# Patient Record
Sex: FEMALE | Race: WHITE | Employment: UNEMPLOYED | URBAN - METROPOLITAN AREA
[De-identification: names, ages, dates, MRNs, and addresses within clinical notes are randomized per-mention and may not be internally consistent; named-entity substitution may affect disease eponyms.]

---

## 2017-01-24 DIAGNOSIS — I10 ESSENTIAL HYPERTENSION: ICD-10-CM

## 2017-01-24 RX ORDER — LABETALOL 100 MG/1
TABLET, FILM COATED ORAL
Qty: 60 TABLET | Refills: 1 | Status: SHIPPED | OUTPATIENT
Start: 2017-01-24 | End: 2017-03-24 | Stop reason: SDUPTHER

## 2017-03-08 ENCOUNTER — OFFICE VISIT (OUTPATIENT)
Dept: FAMILY MEDICINE CLINIC | Age: 42
End: 2017-03-08

## 2017-03-08 VITALS
WEIGHT: 160 LBS | TEMPERATURE: 97.3 F | BODY MASS INDEX: 28.34 KG/M2 | SYSTOLIC BLOOD PRESSURE: 132 MMHG | HEART RATE: 86 BPM | DIASTOLIC BLOOD PRESSURE: 88 MMHG | RESPIRATION RATE: 18 BRPM | OXYGEN SATURATION: 98 %

## 2017-03-08 DIAGNOSIS — R59.9 ENLARGED LYMPH NODE: Primary | ICD-10-CM

## 2017-03-08 LAB
BASOPHILS ABSOLUTE: 0.1 K/UL (ref 0–0.2)
BASOPHILS RELATIVE PERCENT: 0.7 %
EOSINOPHILS ABSOLUTE: 0.1 K/UL (ref 0–0.6)
EOSINOPHILS RELATIVE PERCENT: 1.8 %
HCT VFR BLD CALC: 44.2 % (ref 36–48)
HEMOGLOBIN: 14.6 G/DL (ref 12–16)
HETEROPHILE ANTIBODIES: NEGATIVE
LYMPHOCYTES ABSOLUTE: 1.3 K/UL (ref 1–5.1)
LYMPHOCYTES RELATIVE PERCENT: 16.8 %
MCH RBC QN AUTO: 30.3 PG (ref 26–34)
MCHC RBC AUTO-ENTMCNC: 33 G/DL (ref 31–36)
MCV RBC AUTO: 91.9 FL (ref 80–100)
MONOCYTES ABSOLUTE: 0.6 K/UL (ref 0–1.3)
MONOCYTES RELATIVE PERCENT: 7.7 %
NEUTROPHILS ABSOLUTE: 5.7 K/UL (ref 1.7–7.7)
NEUTROPHILS RELATIVE PERCENT: 73 %
PDW BLD-RTO: 12.5 % (ref 12.4–15.4)
PLATELET # BLD: 305 K/UL (ref 135–450)
PMV BLD AUTO: 8.4 FL (ref 5–10.5)
RBC # BLD: 4.81 M/UL (ref 4–5.2)
WBC # BLD: 7.8 K/UL (ref 4–11)

## 2017-03-08 PROCEDURE — 99213 OFFICE O/P EST LOW 20 MIN: CPT | Performed by: NURSE PRACTITIONER

## 2017-03-08 PROCEDURE — 36415 COLL VENOUS BLD VENIPUNCTURE: CPT | Performed by: NURSE PRACTITIONER

## 2017-03-08 PROCEDURE — 86308 HETEROPHILE ANTIBODY SCREEN: CPT | Performed by: NURSE PRACTITIONER

## 2017-03-08 RX ORDER — AMOXICILLIN 500 MG/1
500 CAPSULE ORAL 2 TIMES DAILY
Qty: 20 CAPSULE | Refills: 0 | Status: SHIPPED | OUTPATIENT
Start: 2017-03-08 | End: 2017-03-18

## 2017-03-09 ASSESSMENT — ENCOUNTER SYMPTOMS
SHORTNESS OF BREATH: 0
COUGH: 0
WHEEZING: 0
EYES NEGATIVE: 1
SPUTUM PRODUCTION: 0
HEMOPTYSIS: 0
RESPIRATORY NEGATIVE: 1

## 2017-03-24 ENCOUNTER — OFFICE VISIT (OUTPATIENT)
Dept: FAMILY MEDICINE CLINIC | Age: 42
End: 2017-03-24

## 2017-03-24 VITALS
RESPIRATION RATE: 17 BRPM | WEIGHT: 161.4 LBS | BODY MASS INDEX: 28.6 KG/M2 | DIASTOLIC BLOOD PRESSURE: 68 MMHG | HEART RATE: 99 BPM | OXYGEN SATURATION: 98 % | HEIGHT: 63 IN | SYSTOLIC BLOOD PRESSURE: 114 MMHG

## 2017-03-24 DIAGNOSIS — E78.00 PURE HYPERCHOLESTEROLEMIA: ICD-10-CM

## 2017-03-24 DIAGNOSIS — I10 ESSENTIAL HYPERTENSION: ICD-10-CM

## 2017-03-24 DIAGNOSIS — Z23 NEED FOR TDAP VACCINATION: ICD-10-CM

## 2017-03-24 DIAGNOSIS — R59.0 POSTERIOR CERVICAL LYMPHADENOPATHY: Primary | ICD-10-CM

## 2017-03-24 DIAGNOSIS — E11.9 TYPE 2 DIABETES MELLITUS WITHOUT COMPLICATION, WITHOUT LONG-TERM CURRENT USE OF INSULIN (HCC): ICD-10-CM

## 2017-03-24 DIAGNOSIS — Z23 NEED FOR 23-POLYVALENT PNEUMOCOCCAL POLYSACCHARIDE VACCINE: ICD-10-CM

## 2017-03-24 LAB
CREATININE URINE POCT: 200
HBA1C MFR BLD: 6.4 %
MICROALBUMIN/CREAT 24H UR: 30 MG/G{CREAT}
MICROALBUMIN/CREAT UR-RTO: <30

## 2017-03-24 PROCEDURE — 90715 TDAP VACCINE 7 YRS/> IM: CPT | Performed by: NURSE PRACTITIONER

## 2017-03-24 PROCEDURE — 90732 PPSV23 VACC 2 YRS+ SUBQ/IM: CPT | Performed by: NURSE PRACTITIONER

## 2017-03-24 PROCEDURE — 90472 IMMUNIZATION ADMIN EACH ADD: CPT | Performed by: NURSE PRACTITIONER

## 2017-03-24 PROCEDURE — 90471 IMMUNIZATION ADMIN: CPT | Performed by: NURSE PRACTITIONER

## 2017-03-24 PROCEDURE — 83036 HEMOGLOBIN GLYCOSYLATED A1C: CPT | Performed by: NURSE PRACTITIONER

## 2017-03-24 PROCEDURE — 82044 UR ALBUMIN SEMIQUANTITATIVE: CPT | Performed by: NURSE PRACTITIONER

## 2017-03-24 PROCEDURE — 99213 OFFICE O/P EST LOW 20 MIN: CPT | Performed by: NURSE PRACTITIONER

## 2017-03-24 RX ORDER — LABETALOL 100 MG/1
TABLET, FILM COATED ORAL
Qty: 60 TABLET | Refills: 2 | Status: SHIPPED | OUTPATIENT
Start: 2017-03-24 | End: 2017-07-12 | Stop reason: SDUPTHER

## 2017-03-24 ASSESSMENT — ENCOUNTER SYMPTOMS
VOMITING: 0
NAUSEA: 0
BACK PAIN: 0
CHEST TIGHTNESS: 0
COUGH: 0

## 2017-03-24 ASSESSMENT — PATIENT HEALTH QUESTIONNAIRE - PHQ9
SUM OF ALL RESPONSES TO PHQ9 QUESTIONS 1 & 2: 0
1. LITTLE INTEREST OR PLEASURE IN DOING THINGS: 0
2. FEELING DOWN, DEPRESSED OR HOPELESS: 0
SUM OF ALL RESPONSES TO PHQ QUESTIONS 1-9: 0

## 2017-03-28 ENCOUNTER — HOSPITAL ENCOUNTER (OUTPATIENT)
Dept: ULTRASOUND IMAGING | Age: 42
Discharge: HOME | End: 2017-03-28
Admitting: NURSE PRACTITIONER

## 2017-03-28 DIAGNOSIS — R59.0 POSTERIOR CERVICAL LYMPHADENOPATHY: ICD-10-CM

## 2017-03-28 DIAGNOSIS — R59.0 LOCALIZED ENLARGED LYMPH NODES: ICD-10-CM

## 2017-03-28 LAB
A/G RATIO: 1.5 (ref 1.1–2.2)
ALBUMIN SERPL-MCNC: 4.3 G/DL (ref 3.4–5)
ALP BLD-CCNC: 89 U/L (ref 40–129)
ALT SERPL-CCNC: 10 U/L (ref 10–40)
ANION GAP SERPL CALCULATED.3IONS-SCNC: 17 MMOL/L (ref 3–16)
AST SERPL-CCNC: 12 U/L (ref 15–37)
BILIRUB SERPL-MCNC: 0.6 MG/DL (ref 0–1)
BUN BLDV-MCNC: 13 MG/DL (ref 7–20)
CALCIUM SERPL-MCNC: 8.9 MG/DL (ref 8.3–10.6)
CHLORIDE BLD-SCNC: 105 MMOL/L (ref 99–110)
CHOLESTEROL, TOTAL: 213 MG/DL (ref 0–199)
CO2: 21 MMOL/L (ref 21–32)
CREAT SERPL-MCNC: 0.6 MG/DL (ref 0.6–1.1)
GFR AFRICAN AMERICAN: >60
GFR NON-AFRICAN AMERICAN: >60
GLOBULIN: 2.8 G/DL
GLUCOSE BLD-MCNC: 189 MG/DL (ref 70–99)
HDLC SERPL-MCNC: 48 MG/DL (ref 40–60)
LDL CHOLESTEROL CALCULATED: 145 MG/DL
POTASSIUM SERPL-SCNC: 4.4 MMOL/L (ref 3.5–5.1)
SODIUM BLD-SCNC: 143 MMOL/L (ref 136–145)
TOTAL PROTEIN: 7.1 G/DL (ref 6.4–8.2)
TRIGL SERPL-MCNC: 99 MG/DL (ref 0–150)
VLDLC SERPL CALC-MCNC: 20 MG/DL

## 2017-03-31 ENCOUNTER — TELEPHONE (OUTPATIENT)
Dept: FAMILY MEDICINE CLINIC | Age: 42
End: 2017-03-31

## 2017-04-03 ENCOUNTER — HOSPITAL ENCOUNTER (OUTPATIENT)
Dept: MAMMOGRAPHY | Age: 42
Discharge: HOME | End: 2017-04-03
Admitting: OBSTETRICS & GYNECOLOGY

## 2017-04-03 DIAGNOSIS — Z12.31 ENCOUNTER FOR SCREENING MAMMOGRAM FOR BREAST CANCER: ICD-10-CM

## 2017-04-03 DIAGNOSIS — R59.0 LOCALIZED ENLARGED LYMPH NODES: ICD-10-CM

## 2017-04-10 ENCOUNTER — HOSPITAL ENCOUNTER (OUTPATIENT)
Dept: MAMMOGRAPHY | Age: 42
Discharge: HOME | End: 2017-04-10
Attending: OBSTETRICS & GYNECOLOGY | Admitting: OBSTETRICS & GYNECOLOGY

## 2017-04-10 ENCOUNTER — HOSPITAL ENCOUNTER (OUTPATIENT)
Dept: ULTRASOUND IMAGING | Age: 42
Discharge: HOME | End: 2017-04-10
Admitting: OBSTETRICS & GYNECOLOGY

## 2017-04-10 DIAGNOSIS — R92.8 ABNORMAL MAMMOGRAM, UNSPECIFIED: ICD-10-CM

## 2017-05-03 ENCOUNTER — OFFICE VISIT (OUTPATIENT)
Dept: FAMILY MEDICINE CLINIC | Age: 42
End: 2017-05-03

## 2017-05-03 VITALS
DIASTOLIC BLOOD PRESSURE: 74 MMHG | OXYGEN SATURATION: 98 % | HEIGHT: 63 IN | RESPIRATION RATE: 14 BRPM | SYSTOLIC BLOOD PRESSURE: 122 MMHG | HEART RATE: 90 BPM | BODY MASS INDEX: 28.21 KG/M2 | WEIGHT: 159.2 LBS

## 2017-05-03 DIAGNOSIS — E55.9 VITAMIN D DEFICIENCY: ICD-10-CM

## 2017-05-03 DIAGNOSIS — L40.9 PSORIASIS: ICD-10-CM

## 2017-05-03 DIAGNOSIS — R59.0 CERVICAL LYMPHADENOPATHY: Primary | ICD-10-CM

## 2017-05-03 PROCEDURE — 99213 OFFICE O/P EST LOW 20 MIN: CPT | Performed by: NURSE PRACTITIONER

## 2017-05-03 RX ORDER — TRIAMCINOLONE ACETONIDE 1 MG/G
CREAM TOPICAL
Qty: 15 G | Refills: 0 | Status: SHIPPED | OUTPATIENT
Start: 2017-05-03 | End: 2018-06-28

## 2017-05-03 ASSESSMENT — ENCOUNTER SYMPTOMS
CHEST TIGHTNESS: 0
NAUSEA: 0
BACK PAIN: 0
COUGH: 0
DIARRHEA: 0

## 2017-05-15 ENCOUNTER — OFFICE VISIT (OUTPATIENT)
Dept: ENT CLINIC | Age: 42
End: 2017-05-15

## 2017-05-15 VITALS
SYSTOLIC BLOOD PRESSURE: 122 MMHG | DIASTOLIC BLOOD PRESSURE: 71 MMHG | TEMPERATURE: 97.8 F | WEIGHT: 159.6 LBS | HEIGHT: 64 IN | BODY MASS INDEX: 27.25 KG/M2 | HEART RATE: 69 BPM

## 2017-05-15 DIAGNOSIS — R22.1 NECK MASS: Primary | ICD-10-CM

## 2017-05-15 PROCEDURE — 99204 OFFICE O/P NEW MOD 45 MIN: CPT | Performed by: OTOLARYNGOLOGY

## 2017-05-17 ENCOUNTER — HOSPITAL ENCOUNTER (OUTPATIENT)
Dept: ULTRASOUND IMAGING | Age: 42
Discharge: HOME | End: 2017-05-17
Admitting: OTOLARYNGOLOGY

## 2017-05-17 DIAGNOSIS — R22.1 NECK MASS: ICD-10-CM

## 2017-05-31 ENCOUNTER — TELEPHONE (OUTPATIENT)
Dept: ENT CLINIC | Age: 42
End: 2017-05-31

## 2017-06-06 ENCOUNTER — OFFICE VISIT (OUTPATIENT)
Dept: ENT CLINIC | Age: 42
End: 2017-06-06

## 2017-06-06 VITALS
SYSTOLIC BLOOD PRESSURE: 120 MMHG | BODY MASS INDEX: 28.31 KG/M2 | WEIGHT: 159.8 LBS | TEMPERATURE: 98.3 F | HEIGHT: 63 IN | DIASTOLIC BLOOD PRESSURE: 74 MMHG

## 2017-06-06 DIAGNOSIS — R59.0 CERVICAL LYMPHADENOPATHY: Primary | ICD-10-CM

## 2017-06-06 PROCEDURE — 99213 OFFICE O/P EST LOW 20 MIN: CPT | Performed by: OTOLARYNGOLOGY

## 2018-01-30 DIAGNOSIS — I10 ESSENTIAL HYPERTENSION: ICD-10-CM

## 2018-01-31 RX ORDER — LABETALOL 100 MG/1
TABLET, FILM COATED ORAL
Qty: 60 TABLET | Refills: 1 | Status: SHIPPED | OUTPATIENT
Start: 2018-01-31 | End: 2018-03-25 | Stop reason: SDUPTHER

## 2018-05-31 DIAGNOSIS — I10 ESSENTIAL HYPERTENSION: ICD-10-CM

## 2018-05-31 RX ORDER — LABETALOL 100 MG/1
TABLET, FILM COATED ORAL
Qty: 60 TABLET | Refills: 0 | Status: SHIPPED | OUTPATIENT
Start: 2018-05-31 | End: 2018-06-28 | Stop reason: SDUPTHER

## 2018-06-28 ENCOUNTER — OFFICE VISIT (OUTPATIENT)
Dept: FAMILY MEDICINE CLINIC | Age: 43
End: 2018-06-28

## 2018-06-28 VITALS
HEART RATE: 87 BPM | RESPIRATION RATE: 16 BRPM | OXYGEN SATURATION: 98 % | SYSTOLIC BLOOD PRESSURE: 126 MMHG | HEIGHT: 63 IN | WEIGHT: 155.2 LBS | DIASTOLIC BLOOD PRESSURE: 84 MMHG | BODY MASS INDEX: 27.5 KG/M2

## 2018-06-28 DIAGNOSIS — L03.211 CELLULITIS OF FACE: ICD-10-CM

## 2018-06-28 DIAGNOSIS — E78.00 PURE HYPERCHOLESTEROLEMIA: ICD-10-CM

## 2018-06-28 DIAGNOSIS — I10 ESSENTIAL HYPERTENSION: ICD-10-CM

## 2018-06-28 DIAGNOSIS — E11.9 TYPE 2 DIABETES MELLITUS WITHOUT COMPLICATION, WITHOUT LONG-TERM CURRENT USE OF INSULIN (HCC): Primary | ICD-10-CM

## 2018-06-28 DIAGNOSIS — J45.20 MILD INTERMITTENT ASTHMA WITHOUT COMPLICATION: ICD-10-CM

## 2018-06-28 LAB
CREATININE URINE POCT: 10
HBA1C MFR BLD: 6.3 %
MICROALBUMIN/CREAT 24H UR: 30 MG/G{CREAT}
MICROALBUMIN/CREAT UR-RTO: 200

## 2018-06-28 PROCEDURE — 83036 HEMOGLOBIN GLYCOSYLATED A1C: CPT | Performed by: NURSE PRACTITIONER

## 2018-06-28 PROCEDURE — 82044 UR ALBUMIN SEMIQUANTITATIVE: CPT | Performed by: NURSE PRACTITIONER

## 2018-06-28 PROCEDURE — 2022F DILAT RTA XM EVC RTNOPTHY: CPT | Performed by: NURSE PRACTITIONER

## 2018-06-28 PROCEDURE — G8419 CALC BMI OUT NRM PARAM NOF/U: HCPCS | Performed by: NURSE PRACTITIONER

## 2018-06-28 PROCEDURE — 3044F HG A1C LEVEL LT 7.0%: CPT | Performed by: NURSE PRACTITIONER

## 2018-06-28 PROCEDURE — 1036F TOBACCO NON-USER: CPT | Performed by: NURSE PRACTITIONER

## 2018-06-28 PROCEDURE — G8427 DOCREV CUR MEDS BY ELIG CLIN: HCPCS | Performed by: NURSE PRACTITIONER

## 2018-06-28 PROCEDURE — 99214 OFFICE O/P EST MOD 30 MIN: CPT | Performed by: NURSE PRACTITIONER

## 2018-06-28 RX ORDER — LABETALOL 100 MG/1
TABLET, FILM COATED ORAL
Qty: 60 TABLET | Refills: 0 | Status: SHIPPED | OUTPATIENT
Start: 2018-06-28 | End: 2018-07-19 | Stop reason: SDUPTHER

## 2018-06-28 ASSESSMENT — ENCOUNTER SYMPTOMS
BACK PAIN: 0
CHEST TIGHTNESS: 0
NAUSEA: 0
SHORTNESS OF BREATH: 1
CONSTIPATION: 0
COUGH: 0

## 2018-06-28 ASSESSMENT — PATIENT HEALTH QUESTIONNAIRE - PHQ9
1. LITTLE INTEREST OR PLEASURE IN DOING THINGS: 0
2. FEELING DOWN, DEPRESSED OR HOPELESS: 0
SUM OF ALL RESPONSES TO PHQ9 QUESTIONS 1 & 2: 0
SUM OF ALL RESPONSES TO PHQ QUESTIONS 1-9: 0

## 2018-08-07 DIAGNOSIS — I10 ESSENTIAL HYPERTENSION: ICD-10-CM

## 2018-08-07 RX ORDER — LABETALOL 100 MG/1
TABLET, FILM COATED ORAL
Qty: 60 TABLET | Refills: 0 | OUTPATIENT
Start: 2018-08-07

## 2018-08-07 NOTE — TELEPHONE ENCOUNTER
.  Last office visit 6/28/2018     Last written 7-20-18     Next office visit scheduled 12/31/2018    Requested Prescriptions     Pending Prescriptions Disp Refills    labetalol (NORMODYNE) 100 MG tablet [Pharmacy Med Name: LABETALOL  MG TABLET] 60 tablet 0     Sig: TAKE 1 TABLET BY MOUTH TWICE A DAY

## 2018-11-15 ENCOUNTER — HOSPITAL ENCOUNTER (OUTPATIENT)
Age: 43
Discharge: HOME | End: 2018-11-15
Payer: COMMERCIAL

## 2018-11-15 DIAGNOSIS — E78.00 PURE HYPERCHOLESTEROLEMIA: ICD-10-CM

## 2018-11-15 LAB
A/G RATIO: 1.8 (ref 1.1–2.2)
ALBUMIN SERPL-MCNC: 4.5 G/DL (ref 3.4–5)
ALP BLD-CCNC: 83 U/L (ref 40–129)
ALT SERPL-CCNC: 11 U/L (ref 10–40)
ANION GAP SERPL CALCULATED.3IONS-SCNC: 12 MMOL/L (ref 3–16)
AST SERPL-CCNC: 12 U/L (ref 15–37)
BILIRUB SERPL-MCNC: 0.6 MG/DL (ref 0–1)
BUN BLDV-MCNC: 13 MG/DL (ref 7–20)
CALCIUM SERPL-MCNC: 9.2 MG/DL (ref 8.3–10.6)
CHLORIDE BLD-SCNC: 104 MMOL/L (ref 99–110)
CHOLESTEROL, TOTAL: 192 MG/DL (ref 0–199)
CO2: 22 MMOL/L (ref 21–32)
CREAT SERPL-MCNC: 0.6 MG/DL (ref 0.6–1.1)
GFR AFRICAN AMERICAN: >60
GFR NON-AFRICAN AMERICAN: >60
GLOBULIN: 2.5 G/DL
GLUCOSE BLD-MCNC: 148 MG/DL (ref 70–99)
HDLC SERPL-MCNC: 47 MG/DL (ref 40–60)
LDL CHOLESTEROL CALCULATED: 126 MG/DL
POTASSIUM SERPL-SCNC: 4.7 MMOL/L (ref 3.5–5.1)
SODIUM BLD-SCNC: 138 MMOL/L (ref 136–145)
TOTAL PROTEIN: 7 G/DL (ref 6.4–8.2)
TRIGL SERPL-MCNC: 96 MG/DL (ref 0–150)
VLDLC SERPL CALC-MCNC: 19 MG/DL

## 2018-11-15 PROCEDURE — 80061 LIPID PANEL: CPT

## 2018-11-15 PROCEDURE — 80053 COMPREHEN METABOLIC PANEL: CPT

## 2018-11-15 PROCEDURE — 36415 COLL VENOUS BLD VENIPUNCTURE: CPT

## 2019-05-23 ENCOUNTER — OFFICE VISIT (OUTPATIENT)
Dept: FAMILY MEDICINE CLINIC | Age: 44
End: 2019-05-23
Payer: COMMERCIAL

## 2019-05-23 VITALS
HEIGHT: 64 IN | OXYGEN SATURATION: 98 % | BODY MASS INDEX: 25.57 KG/M2 | HEART RATE: 66 BPM | WEIGHT: 149.8 LBS | RESPIRATION RATE: 16 BRPM | TEMPERATURE: 97.8 F | DIASTOLIC BLOOD PRESSURE: 104 MMHG | SYSTOLIC BLOOD PRESSURE: 142 MMHG

## 2019-05-23 DIAGNOSIS — E11.9 TYPE 2 DIABETES MELLITUS WITHOUT COMPLICATION, WITHOUT LONG-TERM CURRENT USE OF INSULIN (HCC): ICD-10-CM

## 2019-05-23 DIAGNOSIS — J45.20 MILD INTERMITTENT ASTHMA WITHOUT COMPLICATION: ICD-10-CM

## 2019-05-23 DIAGNOSIS — R07.9 CHEST PAIN, UNSPECIFIED TYPE: Primary | ICD-10-CM

## 2019-05-23 DIAGNOSIS — M25.362 KNEE INSTABILITY, LEFT: ICD-10-CM

## 2019-05-23 DIAGNOSIS — I10 ESSENTIAL HYPERTENSION: ICD-10-CM

## 2019-05-23 LAB — HBA1C MFR BLD: 6.4 %

## 2019-05-23 PROCEDURE — G8419 CALC BMI OUT NRM PARAM NOF/U: HCPCS | Performed by: NURSE PRACTITIONER

## 2019-05-23 PROCEDURE — 99214 OFFICE O/P EST MOD 30 MIN: CPT | Performed by: NURSE PRACTITIONER

## 2019-05-23 PROCEDURE — 3044F HG A1C LEVEL LT 7.0%: CPT | Performed by: NURSE PRACTITIONER

## 2019-05-23 PROCEDURE — 1036F TOBACCO NON-USER: CPT | Performed by: NURSE PRACTITIONER

## 2019-05-23 PROCEDURE — G8427 DOCREV CUR MEDS BY ELIG CLIN: HCPCS | Performed by: NURSE PRACTITIONER

## 2019-05-23 PROCEDURE — 83036 HEMOGLOBIN GLYCOSYLATED A1C: CPT | Performed by: NURSE PRACTITIONER

## 2019-05-23 PROCEDURE — 2022F DILAT RTA XM EVC RTNOPTHY: CPT | Performed by: NURSE PRACTITIONER

## 2019-05-23 PROCEDURE — 93000 ELECTROCARDIOGRAM COMPLETE: CPT | Performed by: NURSE PRACTITIONER

## 2019-05-23 RX ORDER — LABETALOL 200 MG/1
200 TABLET, FILM COATED ORAL 2 TIMES DAILY
Qty: 60 TABLET | Refills: 5 | Status: SHIPPED | OUTPATIENT
Start: 2019-05-23 | End: 2019-11-10 | Stop reason: SDUPTHER

## 2019-05-23 ASSESSMENT — PATIENT HEALTH QUESTIONNAIRE - PHQ9
1. LITTLE INTEREST OR PLEASURE IN DOING THINGS: 0
SUM OF ALL RESPONSES TO PHQ QUESTIONS 1-9: 0
2. FEELING DOWN, DEPRESSED OR HOPELESS: 0
SUM OF ALL RESPONSES TO PHQ QUESTIONS 1-9: 0
SUM OF ALL RESPONSES TO PHQ9 QUESTIONS 1 & 2: 0

## 2019-05-23 ASSESSMENT — ENCOUNTER SYMPTOMS
CONSTIPATION: 0
SHORTNESS OF BREATH: 0
BACK PAIN: 0
CHEST TIGHTNESS: 1
NAUSEA: 0
VOMITING: 0
COUGH: 0

## 2019-05-23 NOTE — PROGRESS NOTES
Subjective:      Patient ID: Philip Price is a 37 y.o. female. HPI     For routine follow DM, migraine headaches. Thinks sugars are OK. Past hx of not tolerating medication. Blood pressure at the store running high. 140's/90 consistently. Taking medication, walking daily. Sunday Had episode of chest pain when at [address], up right neck. Grabbed aspirin of the shelf and took with relief after 5-6 minutes. Has intermittent tightness in chest off and on . Seen by cardiology 2016 and stress test ordered but didn't have done. Right knee pops out when on treadmill. Didn't follow up for discussion with cardiology. Stress, only one working in family and away from kids for first time. Lump left neck over the past week. Has had evaluated in the past had US and ENT referral 2017, felt to be lymph gland. Past hx of migraine usually managed with iburpofen. Taking labetalol for HTN because only medication safe during pregnancy. Not planning to conceive but wants to be prepared. Review of Systems   Constitutional: Negative for appetite change, chills and fever. Respiratory: Positive for chest tightness. Negative for cough and shortness of breath. Cardiovascular: Positive for chest pain. Negative for palpitations. Gastrointestinal: Negative for constipation, nausea and vomiting. Musculoskeletal: Negative for arthralgias, back pain and gait problem. Neurological: Negative for dizziness and headaches. Hematological: Positive for adenopathy. Psychiatric/Behavioral: Negative for self-injury and suicidal ideas. The patient is nervous/anxious. Objective:   Physical Exam   Constitutional: She appears well-developed and well-nourished. No distress. HENT:   Head: Normocephalic and atraumatic. Neck: Normal range of motion. Neck supple. No thyromegaly present. Left neck with approximate 1cm round firm nodule mid lateral neck. Non tender. Neg for redness.     Cardiovascular: Normal rate, regular rhythm and normal heart sounds. Pulmonary/Chest: Effort normal and breath sounds normal. No respiratory distress. Abdominal: Soft. Bowel sounds are normal. She exhibits no distension. Musculoskeletal: Normal range of motion. She exhibits no edema. Lymphadenopathy:     She has cervical adenopathy. Neurological: She is alert. Skin: Skin is warm. No erythema. Psychiatric: She has a normal mood and affect. Her behavior is normal.        Current Outpatient Medications   Medication Sig Dispense Refill    labetalol (NORMODYNE) 100 MG tablet TAKE 1 TABLET BY MOUTH TWICE A DAY 60 tablet 1    albuterol sulfate (PROAIR RESPICLICK) 289 (90 Base) MCG/ACT aerosol powder inhalation Inhale 2 puffs into the lungs every 6 hours as needed for Wheezing or Shortness of Breath 1 Inhaler 0    CVS VIT D 5000 HIGH-POTENCY 5000 units CAPS capsule TAKE 1 CAPSULE BY MOUTH DAILY 30 capsule 1    Blood Glucose Monitoring Suppl RAJIV 1 Device by Does not apply route daily 1 Device 0    glucose blood VI test strips (ASCENSIA AUTODISC VI;ONE TOUCH ULTRA TEST VI) strip DX: E11.9 FSBS daily 50 strip 5    acetaminophen (TYLENOL) 325 MG tablet Take 650 mg by mouth every 6 hours as needed for Pain      Multiple Vitamins-Minerals (THERAPEUTIC MULTIVITAMIN-MINERALS) tablet Take 1 tablet by mouth daily       No current facility-administered medications for this visit. Assessment:      1. HTN-elevated  2. DM-stable off medication  3. Lymphadenopathy left neck        Plan:      1. Lab Results   Component Value Date    LABA1C 6.4 05/23/2019     Lab Results   Component Value Date    EAG 91.1 12/09/2009     2. Monitor neck lymph gland. If not improved over next 10-14 days will order US    3. Zarajoseph Nayak was seen today for hypertension and neck pain.     Diagnoses and all orders for this visit:    Type 2 diabetes mellitus without complication, without long-term current use of insulin (HCC)  -     POCT glycosylated hemoglobin

## 2019-05-23 NOTE — PATIENT INSTRUCTIONS
1.   Lab Results   Component Value Date    LABA1C 6.4 05/23/2019     Lab Results   Component Value Date    EAG 91.1 12/09/2009     2. Monitor neck lymph gland. If not improved over next 10-14 days will order US    3. Yg Mota was seen today for hypertension and neck pain. Diagnoses and all orders for this visit:    Type 2 diabetes mellitus without complication, without long-term current use of insulin (HCC)  -     POCT glycosylated hemoglobin (Hb A1C)    Mild intermittent asthma without complication  -     albuterol sulfate (PROAIR RESPICLICK) 750 (90 Base) MCG/ACT aerosol powder inhalation; Inhale 2 puffs into the lungs every 6 hours as needed for Wheezing or Shortness of Breath    Essential hypertension  -     labetalol (NORMODYNE) 200 MG tablet;  Take 1 tablet by mouth 2 times daily    Chest pain, unspecified type  -     Stress Test W Pharm  -     EKG 12 Lead    Knee instability, left  -     Stress Test W Pharm  -     EKG 12 Lead

## 2019-07-08 ENCOUNTER — OFFICE VISIT (OUTPATIENT)
Dept: FAMILY MEDICINE CLINIC | Age: 44
End: 2019-07-08
Payer: COMMERCIAL

## 2019-07-08 VITALS
SYSTOLIC BLOOD PRESSURE: 136 MMHG | OXYGEN SATURATION: 99 % | HEART RATE: 71 BPM | WEIGHT: 149 LBS | HEIGHT: 63 IN | DIASTOLIC BLOOD PRESSURE: 84 MMHG | RESPIRATION RATE: 16 BRPM | BODY MASS INDEX: 26.4 KG/M2

## 2019-07-08 DIAGNOSIS — E11.9 TYPE 2 DIABETES MELLITUS WITHOUT COMPLICATION, WITHOUT LONG-TERM CURRENT USE OF INSULIN (HCC): Primary | ICD-10-CM

## 2019-07-08 DIAGNOSIS — I10 ESSENTIAL HYPERTENSION: ICD-10-CM

## 2019-07-08 LAB
CREATININE URINE POCT: 200
MICROALBUMIN/CREAT 24H UR: 30 MG/G{CREAT}
MICROALBUMIN/CREAT UR-RTO: <30

## 2019-07-08 PROCEDURE — 1036F TOBACCO NON-USER: CPT | Performed by: NURSE PRACTITIONER

## 2019-07-08 PROCEDURE — G8428 CUR MEDS NOT DOCUMENT: HCPCS | Performed by: NURSE PRACTITIONER

## 2019-07-08 PROCEDURE — 99213 OFFICE O/P EST LOW 20 MIN: CPT | Performed by: NURSE PRACTITIONER

## 2019-07-08 PROCEDURE — 3044F HG A1C LEVEL LT 7.0%: CPT | Performed by: NURSE PRACTITIONER

## 2019-07-08 PROCEDURE — 2022F DILAT RTA XM EVC RTNOPTHY: CPT | Performed by: NURSE PRACTITIONER

## 2019-07-08 PROCEDURE — G8419 CALC BMI OUT NRM PARAM NOF/U: HCPCS | Performed by: NURSE PRACTITIONER

## 2019-07-08 PROCEDURE — 82044 UR ALBUMIN SEMIQUANTITATIVE: CPT | Performed by: NURSE PRACTITIONER

## 2019-07-08 RX ORDER — LANCETS 30 GAUGE
1 EACH MISCELLANEOUS DAILY
Qty: 100 EACH | Refills: 3 | Status: SHIPPED | OUTPATIENT
Start: 2019-07-08 | End: 2019-07-10 | Stop reason: SDUPTHER

## 2019-07-08 ASSESSMENT — ENCOUNTER SYMPTOMS
CHEST TIGHTNESS: 0
NAUSEA: 0
COUGH: 0
BACK PAIN: 0
CONSTIPATION: 0
SHORTNESS OF BREATH: 0

## 2019-07-08 NOTE — PROGRESS NOTES
Subjective:      Patient ID: Marene Siemens is a 37 y.o. female. HPI     For follow up HTN. 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 Now again with another family. 3 children, oldest with ADHD, oppositional defiant age 11, another age 1 and anothr age 3. Canceled stress test because of new job. Past 6 weeks with some pain top of buttocks, causes her to push off with hands when raising from seated. Review of Systems   Constitutional: Negative for appetite change, chills and fever. Respiratory: Negative for cough, chest tightness and shortness of breath. Cardiovascular: Negative for chest pain and palpitations. Gastrointestinal: Negative for constipation and nausea. Musculoskeletal: Negative for arthralgias, back pain and gait problem. Neurological: Negative for dizziness and headaches. Psychiatric/Behavioral: Negative. Objective:   Physical Exam   Constitutional: She appears well-developed and well-nourished. No distress. HENT:   Head: Normocephalic and atraumatic. Neck: Normal range of motion. Neck supple. No thyromegaly present. Cardiovascular: Normal rate and regular rhythm. Pulmonary/Chest: Effort normal and breath sounds normal. No respiratory distress. Abdominal: Soft. Bowel sounds are normal. She exhibits no distension. Musculoskeletal: Normal range of motion. She exhibits no edema. Lymphadenopathy:     She has no cervical adenopathy. Neurological: She is alert. Sensory exam of the foot is normal, tested with the monofilament. Good pulses, no lesions or ulcers, good peripheral pulses. Skin: Skin is warm. No erythema. Psychiatric: She has a normal mood and affect.  Her behavior is normal.     Current Outpatient Medications   Medication Sig Dispense Refill    albuterol sulfate (PROAIR RESPICLICK) 913 (90 Base) MCG/ACT aerosol powder inhalation Inhale 2 puffs into the lungs every 6 hours as needed for Wheezing or

## 2019-07-09 ENCOUNTER — TELEPHONE (OUTPATIENT)
Dept: FAMILY MEDICINE CLINIC | Age: 44
End: 2019-07-09

## 2019-07-09 DIAGNOSIS — E11.9 TYPE 2 DIABETES MELLITUS WITHOUT COMPLICATION, WITHOUT LONG-TERM CURRENT USE OF INSULIN (HCC): ICD-10-CM

## 2019-07-10 DIAGNOSIS — E11.9 TYPE 2 DIABETES MELLITUS WITHOUT COMPLICATION, WITHOUT LONG-TERM CURRENT USE OF INSULIN (HCC): ICD-10-CM

## 2019-07-11 RX ORDER — LANCETS 30 GAUGE
1 EACH MISCELLANEOUS 2 TIMES DAILY
Qty: 100 EACH | Refills: 5 | Status: SHIPPED | OUTPATIENT
Start: 2019-07-11 | End: 2021-03-04

## 2020-11-19 ENCOUNTER — NURSE TRIAGE (OUTPATIENT)
Dept: OTHER | Facility: CLINIC | Age: 45
End: 2020-11-19

## 2020-11-19 ENCOUNTER — TELEPHONE (OUTPATIENT)
Dept: FAMILY MEDICINE CLINIC | Age: 45
End: 2020-11-19

## 2020-11-19 NOTE — TELEPHONE ENCOUNTER
Reason for Disposition   Diabetes mellitus or weak immune system (e.g., HIV positive, cancer chemo, splenectomy, organ transplant, chronic steroids)    Answer Assessment - Initial Assessment Questions  1. SEVERITY: \"How bad is the pain? \"  (e.g., Scale 1-10; mild, moderate, or severe)    - MILD (1-3): complains slightly about urination hurting    - MODERATE (4-7): interferes with normal activities      - SEVERE (8-10): excruciating, unwilling or unable to urinate because of the pain       Moderate     2. FREQUENCY: \"How many times have you had painful urination today? \"       Frequent    3. PATTERN: \"Is pain present every time you urinate or just sometimes? \"       everytime    4. ONSET: \"When did the painful urination start? \"       Monday    5. FEVER: \"Do you have a fever? \" If so, ask: \"What is your temperature, how was it measured, and when did it start? \"      No    6. PAST UTI: \"Have you had a urine infection before? \" If so, ask: \"When was the last time? \" and \"What happened that time? \"       Yes use cranberry fills and goes away    7. CAUSE: \"What do you think is causing the painful urination? \"  (e.g., UTI, scratch, Herpes sore)      UTI    8. OTHER SYMPTOMS: \"Do you have any other symptoms? \" (e.g., flank pain, vaginal discharge, genital sores, urgency, blood in urine)      Flank pain, frequency    9. PREGNANCY: \"Is there any chance you are pregnant? \" \"When was your last menstrual period? \"      no    Protocols used: URINATION PAIN - FEMALE-ADULT-      Caller provided care advice and instructed to call back with worsening symptoms. Attention Provider: Thank you for allowing me to participate in the care of your patient. The patient was connected to triage in response to information provided to the [facility]. Please do not respond through this encounter as the response is not directed to a shared pool.        Pt transferred to [facility] in [place] office

## 2020-11-20 RX ORDER — NITROFURANTOIN 25; 75 MG/1; MG/1
100 CAPSULE ORAL 2 TIMES DAILY
Qty: 14 CAPSULE | Refills: 0 | Status: SHIPPED | OUTPATIENT
Start: 2020-11-20 | End: 2021-03-04

## 2020-12-21 RX ORDER — LABETALOL 200 MG/1
TABLET, FILM COATED ORAL
Qty: 60 TABLET | Refills: 0 | Status: SHIPPED | OUTPATIENT
Start: 2020-12-21 | End: 2021-02-18 | Stop reason: SDUPTHER

## 2020-12-21 NOTE — TELEPHONE ENCOUNTER
Refill Request     Last Seen: 7/8/2019    Last Written: #60   5rf  5/2/2020    Next Appointment:   No future appointments.           Requested Prescriptions     Pending Prescriptions Disp Refills    labetalol (NORMODYNE) 200 MG tablet 60 tablet 5     Sig: TAKE 1 TABLET BY MOUTH TWICE A DAY

## 2021-02-18 DIAGNOSIS — I10 ESSENTIAL HYPERTENSION: ICD-10-CM

## 2021-02-18 DIAGNOSIS — E55.9 VITAMIN D DEFICIENCY: Primary | ICD-10-CM

## 2021-02-18 RX ORDER — LABETALOL 200 MG/1
TABLET, FILM COATED ORAL
Qty: 60 TABLET | Refills: 0 | Status: SHIPPED | OUTPATIENT
Start: 2021-02-18 | End: 2021-03-04 | Stop reason: SDUPTHER

## 2021-02-18 NOTE — TELEPHONE ENCOUNTER
Refill Request     Last Seen: 7/8/2019    Last Written: 12/21/2020 Labetalol  3/5/2018 Vit D    Next Appointment:   Future Appointments   Date Time Provider Yudith Bailey   3/4/2021 10:00 AM Aleah Factor, APRN - CNP [facility]       Future appointment scheduled      Requested Prescriptions     Pending Prescriptions Disp Refills    labetalol (NORMODYNE) 200 MG tablet 60 tablet 0     Sig: TAKE 1 TABLET BY MOUTH TWICE A DAY    vitamin D (CHOLECALCIFEROL) 125 MCG (5000 UT) CAPS capsule 30 capsule 1     Sig: Take 1 capsule by mouth daily

## 2021-02-18 NOTE — TELEPHONE ENCOUNTER
----- Message from Yany Welch sent at 2/17/2021  5:15 PM EST -----  Subject: Refill Request    QUESTIONS  Name of Medication? CVS VIT D 5000 HIGH-POTENCY 5000 units CAPS capsule  Patient-reported dosage and instructions? 1 capsule daily  How many days do you have left? 0  Preferred Pharmacy? [facility]/PHARMACY #[id number]  Pharmacy phone number (if available)? [phone]  ---------------------------------------------------------------------------  --------------    Name of Medication? labetalol (NORMODYNE) 200 MG tablet  Patient-reported dosage and instructions? 1 tablet twice a day  How many days do you have left? 3  Preferred Pharmacy? [facility]/PHARMACY #[id number]  Pharmacy phone number (if available)? [phone]  Additional Information for Provider? Pt made appt for 3/4. Does not have   enough to last her until appt. Needs some at least to last her until appt. Blood pressure keeps spiking when she tries to stop them.  ---------------------------------------------------------------------------  --------------  CALL BACK INFO  What is the best way for the office to contact you? OK to leave message on   voicemail  Preferred Call Back Phone Number?  [phone]

## 2021-03-04 ENCOUNTER — TELEPHONE (OUTPATIENT)
Dept: FAMILY MEDICINE CLINIC | Age: 46
End: 2021-03-04

## 2021-03-04 ENCOUNTER — OFFICE VISIT (OUTPATIENT)
Dept: FAMILY MEDICINE CLINIC | Age: 46
End: 2021-03-04
Payer: COMMERCIAL

## 2021-03-04 VITALS
RESPIRATION RATE: 16 BRPM | BODY MASS INDEX: 28.01 KG/M2 | HEART RATE: 80 BPM | DIASTOLIC BLOOD PRESSURE: 68 MMHG | HEIGHT: 62 IN | TEMPERATURE: 97.7 F | WEIGHT: 152.2 LBS | SYSTOLIC BLOOD PRESSURE: 118 MMHG | OXYGEN SATURATION: 98 %

## 2021-03-04 DIAGNOSIS — I10 ESSENTIAL HYPERTENSION: ICD-10-CM

## 2021-03-04 DIAGNOSIS — E55.9 VITAMIN D DEFICIENCY: ICD-10-CM

## 2021-03-04 DIAGNOSIS — R07.89 CHEST TIGHTNESS: ICD-10-CM

## 2021-03-04 DIAGNOSIS — Z12.31 ENCOUNTER FOR SCREENING MAMMOGRAM FOR MALIGNANT NEOPLASM OF BREAST: ICD-10-CM

## 2021-03-04 DIAGNOSIS — E78.00 PURE HYPERCHOLESTEROLEMIA: ICD-10-CM

## 2021-03-04 DIAGNOSIS — Z80.0 FAMILY HISTORY OF COLON CANCER IN FATHER: ICD-10-CM

## 2021-03-04 DIAGNOSIS — J45.20 MILD INTERMITTENT ASTHMA WITHOUT COMPLICATION: ICD-10-CM

## 2021-03-04 DIAGNOSIS — E11.9 TYPE 2 DIABETES MELLITUS WITHOUT COMPLICATION, WITHOUT LONG-TERM CURRENT USE OF INSULIN (HCC): Primary | ICD-10-CM

## 2021-03-04 LAB
CREATININE URINE POCT: 100
HBA1C MFR BLD: 6.1 %
MICROALBUMIN/CREAT 24H UR: 30 MG/G{CREAT}
MICROALBUMIN/CREAT UR-RTO: <30

## 2021-03-04 PROCEDURE — 3044F HG A1C LEVEL LT 7.0%: CPT | Performed by: NURSE PRACTITIONER

## 2021-03-04 PROCEDURE — G8419 CALC BMI OUT NRM PARAM NOF/U: HCPCS | Performed by: NURSE PRACTITIONER

## 2021-03-04 PROCEDURE — 83036 HEMOGLOBIN GLYCOSYLATED A1C: CPT | Performed by: NURSE PRACTITIONER

## 2021-03-04 PROCEDURE — G8484 FLU IMMUNIZE NO ADMIN: HCPCS | Performed by: NURSE PRACTITIONER

## 2021-03-04 PROCEDURE — 82044 UR ALBUMIN SEMIQUANTITATIVE: CPT | Performed by: NURSE PRACTITIONER

## 2021-03-04 PROCEDURE — 2022F DILAT RTA XM EVC RTNOPTHY: CPT | Performed by: NURSE PRACTITIONER

## 2021-03-04 PROCEDURE — G8427 DOCREV CUR MEDS BY ELIG CLIN: HCPCS | Performed by: NURSE PRACTITIONER

## 2021-03-04 PROCEDURE — 99214 OFFICE O/P EST MOD 30 MIN: CPT | Performed by: NURSE PRACTITIONER

## 2021-03-04 PROCEDURE — 1036F TOBACCO NON-USER: CPT | Performed by: NURSE PRACTITIONER

## 2021-03-04 RX ORDER — LABETALOL 200 MG/1
TABLET, FILM COATED ORAL
Qty: 60 TABLET | Refills: 5 | Status: SHIPPED | OUTPATIENT
Start: 2021-03-04 | End: 2021-09-07

## 2021-03-04 RX ORDER — BIOTIN 1 MG
1 TABLET ORAL DAILY
COMMUNITY

## 2021-03-04 SDOH — ECONOMIC STABILITY: FOOD INSECURITY: WITHIN THE PAST 12 MONTHS, YOU WORRIED THAT YOUR FOOD WOULD RUN OUT BEFORE YOU GOT MONEY TO BUY MORE.: NEVER TRUE

## 2021-03-04 SDOH — ECONOMIC STABILITY: TRANSPORTATION INSECURITY
IN THE PAST 12 MONTHS, HAS LACK OF TRANSPORTATION KEPT YOU FROM MEETINGS, WORK, OR FROM GETTING THINGS NEEDED FOR DAILY LIVING?: NO

## 2021-03-04 ASSESSMENT — PATIENT HEALTH QUESTIONNAIRE - PHQ9
SUM OF ALL RESPONSES TO PHQ QUESTIONS 1-9: 12
SUM OF ALL RESPONSES TO PHQ QUESTIONS 1-9: 12
5. POOR APPETITE OR OVEREATING: 1
6. FEELING BAD ABOUT YOURSELF - OR THAT YOU ARE A FAILURE OR HAVE LET YOURSELF OR YOUR FAMILY DOWN: 0
7. TROUBLE CONCENTRATING ON THINGS, SUCH AS READING THE NEWSPAPER OR WATCHING TELEVISION: 3
SUM OF ALL RESPONSES TO PHQ9 QUESTIONS 1 & 2: 3
SUM OF ALL RESPONSES TO PHQ QUESTIONS 1-9: 12

## 2021-03-04 ASSESSMENT — ENCOUNTER SYMPTOMS
DIARRHEA: 0
NAUSEA: 0
BACK PAIN: 0
SHORTNESS OF BREATH: 1
CHEST TIGHTNESS: 0

## 2021-03-04 ASSESSMENT — COLUMBIA-SUICIDE SEVERITY RATING SCALE - C-SSRS: 2. HAVE YOU ACTUALLY HAD ANY THOUGHTS OF KILLING YOURSELF?: NO

## 2021-03-04 NOTE — PROGRESS NOTES
3/4/2021    Mitali Finley (:  1975) is a 50 y.o. female, here for a preventive medicine evaluation. Past hx of asthma, mild. Recenctly with tightness in chest. Previously discussed and ordered stress test couple of but not to completed. Today asking for another order for stress test. Not using albuterol. Ongoing sensation that blood sugar drops if not eating routinely. Past elevation of HgA1c but not taking medication for this reason. Having pain bilateral thighs lateral area at night. Using heat. Patient Active Problem List   Diagnosis    Type 2 diabetes mellitus without complication ([facility] [facility]    Essential hypertension    Pure hypercholesterolemia    Vitamin D deficiency    Cervical lymphadenopathy    Psoriasis    Mild intermittent asthma without complication       Review of Systems   Constitutional: Negative for chills, fever and unexpected weight change. Respiratory: Positive for shortness of breath. Negative for chest tightness. Cardiovascular: Negative for chest pain and palpitations. Gastrointestinal: Negative for diarrhea and nausea. Musculoskeletal: Negative for arthralgias, back pain and gait problem. Neurological: Negative for dizziness and headaches. Psychiatric/Behavioral: Negative. Prior to Visit Medications    Medication Sig Taking?  Authorizing Provider   Biotin 1000 MCG TABS Take 1 tablet by mouth daily Yes Historical Provider, MD   labetalol (NORMODYNE) 200 MG tablet TAKE 1 TABLET BY MOUTH TWICE A DAY Yes BARB Amador CNP   albuterol sulfate (PROAIR RESPICLICK) 057 (90 Base) MCG/ACT aerosol powder inhalation Inhale 2 puffs into the lungs every 6 hours as needed for Wheezing or Shortness of Breath Yes BARB Villatoro CNP   vitamin D (CHOLECALCIFEROL) 125 MCG (5000 UT) CAPS capsule Take 1 capsule by mouth daily Yes BARB Villatoro CNP   Lancets MISC 1 each by Does not apply route 2 times daily DX: E 11.9 FSBS daily. Brand approved by insurance. Yes BARB Camejo CNP   blood glucose test strips (ASCENSIA AUTODISC VI;ONE TOUCH ULTRA TEST VI) strip DX: E11.9 Brand approved by insurance. FSBS daily Yes BARB Camejo CNP   blood glucose monitor kit and supplies by Other route daily E11.9. FSBS daily. Brand covered by insurance Yes BARB Camejo CNP   acetaminophen (TYLENOL) 325 MG tablet Take 650 mg by mouth every 6 hours as needed for Pain Yes Historical Provider, MD   Multiple Vitamins-Minerals (THERAPEUTIC MULTIVITAMIN-MINERALS) tablet Take 1 tablet by mouth daily Yes Historical Provider, MD        Allergies   Allergen Reactions    Eggs Or Egg-Derived Products Nausea Only     Yolk. Takes flu shot yearly without problems. Past Medical History:   Diagnosis Date    [facility]     Hypertension        History reviewed. No pertinent surgical history.     Social History     Socioeconomic History    Marital status:      Spouse name: Not on file    Number of children: Not on file    Years of education: Not on file    Highest education level: Not on file   Occupational History    Not on file   Social Needs    Financial resource strain: Somewhat hard    Food insecurity     Worry: Never true     Inability: Never true    Transportation needs     Medical: No     Non-medical: No   Tobacco Use    Smoking status: Never Smoker    Smokeless tobacco: Never Used   Substance and Sexual Activity    Alcohol use: Yes     Comment: 2 times per year    Drug use: No    Sexual activity: Yes     Partners: Male   Lifestyle    Physical activity     Days per week: Not on file     Minutes per session: Not on file    Stress: Not on file   Relationships    Social connections     Talks on phone: Not on file     Gets together: Not on file     Attends Church service: Not on file     Active member of club or organization: Not on file     Attends meetings of clubs or organizations: Not on file     Relationship status: Not on file    Intimate partner violence     Fear of current or ex partner: Not on file     Emotionally abused: Not on file     Physically abused: Not on file     Forced sexual activity: Not on file   Other Topics Concern    Not on file   Social History Narrative    Not on file        Family History   Problem Relation Age of Onset    Hypertension Mother     Cancer Father 52        colorectal    Diabetes Father     Hypertension Father     Heart Attack Father     Other Father         GERD-surgery    Diabetes Brother     Other Brother         GERD-surgery    Hypertension Maternal Grandmother     Diabetes Maternal Grandmother     Hypertension Maternal Grandfather     Other Maternal Grandfather         gastric ulcers requiring surgery    Dementia Maternal Grandfather         after head injurt    Heart Failure Paternal Grandmother     Diabetes Paternal Grandmother     Cancer Paternal Grandfather         lung       ADVANCE DIRECTIVE: N, <no information>    Vitals:    03/04/21 1003   BP: 118/68   Site: Right Upper Arm   Position: Sitting   Cuff Size: Medium Adult   Pulse: 80   Resp: 16   Temp: 97.7 °F (36.5 °C)   TempSrc: Temporal   SpO2: 98%   Weight: 152 lb 3.2 oz (69 kg)   Height: 5' 1.5\" (1.562 m)     Estimated body mass index is 28.29 kg/m² as calculated from the following:    Height as of this encounter: 5' 1.5\" (1.562 m). Weight as of this encounter: 152 lb 3.2 oz (69 kg). Physical Exam  Constitutional:       Appearance: Normal appearance. Cardiovascular:      Rate and Rhythm: Normal rate and regular rhythm. Pulses: Normal pulses. Heart sounds: Normal heart sounds. Pulmonary:      Effort: Pulmonary effort is normal. No respiratory distress. Comments: diminished breath sounds posterior. Musculoskeletal: Normal range of motion. General: No tenderness.    Neurological:      Mental Status: She is alert and oriented to person, place, and time. Psychiatric:         Mood and Affect: Mood normal.         Behavior: Behavior normal.         No flowsheet data found.     Lab Results   Component Value Date    CHOL 192 11/15/2018    CHOL 213 03/28/2017    CHOL 232 11/02/2015    TRIG 96 11/15/2018    TRIG 99 03/28/2017    TRIG 106 11/02/2015    HDL 47 11/15/2018    HDL 48 03/28/2017    HDL 52 11/02/2015    LDLCALC 126 11/15/2018    LDLCALC 145 03/28/2017    LDLCALC 159 11/02/2015    GLUCOSE 148 11/15/2018    LABA1C 6.1 03/04/2021    LABA1C 6.4 05/23/2019    LABA1C 6.3 06/28/2018       The 10-year ASCVD risk score (Cleo Herndon, et al., 2013) is: 1.9%    Values used to calculate the score:      Age: 50 years      Sex: Female      Is Non- : No      Diabetic: Yes      Tobacco smoker: No      Systolic Blood Pressure: 749 mmHg      Is BP treated: No      HDL Cholesterol: 47 mg/dL      Total Cholesterol: 192 mg/dL    Immunization History   Administered Date(s) Administered    Influenza A ([id number]) Vaccine PF IM 10/29/2009    Influenza Virus Vaccine 10/28/2015, 01/22/2020    Influenza, Quadv, IM, PF (6 mo and older Fluzone, Flulaval, Fluarix, and 3 yrs and older Afluria) 11/10/2018    Pneumococcal Polysaccharide ([id number]) 03/24/2017    Tdap (Boostrix, Adacel) 03/24/2017       Health Maintenance   Topic Date Due    Hepatitis C screen  Never done    Diabetic retinal exam  Never done    HIV screen  Never done    Hepatitis B vaccine (1 of 3 - Risk 3-dose series) Never done    Cervical cancer screen  Never done    Lipid screen  11/15/2019    Potassium monitoring  11/15/2019    Creatinine monitoring  11/15/2019    Diabetic microalbuminuria test  07/08/2020    Flu vaccine (1) 09/01/2020    Diabetic foot exam  03/04/2022    A1C test (Diabetic or Prediabetic)  03/04/2022    DTaP/Tdap/Td vaccine (2 - Td) 03/24/2027    Pneumococcal 0-64 years Vaccine  Completed    Hepatitis A vaccine  Aged Out    Hib vaccine Aged Out    Meningococcal (ACWY) vaccine  Aged Out       ASSESSMENT/PLAN:  1. Type 2 diabetes mellitus without complication, without long-term current use of insulin (HCC)  -     POCT glycosylated hemoglobin (Hb A1C)  -     POCT microalbumin  -     HM DIABETES FOOT EXAM  2. Mild intermittent asthma without complication  -     CBC Auto Differential; Future  -     albuterol sulfate (PROAIR RESPICLICK) 831 (90 Base) MCG/ACT aerosol powder inhalation; Inhale 2 puffs into the lungs every 6 hours as needed for Wheezing or Shortness of Breath, Disp-1 Inhaler, R-5Normal  3. Pure hypercholesterolemia  -     Lipid Panel; Future  -     Comprehensive Metabolic Panel; Future  4. Essential hypertension  -     Comprehensive Metabolic Panel; Future  -     labetalol (NORMODYNE) 200 MG tablet; TAKE 1 TABLET BY MOUTH TWICE A DAY, Disp-60 tablet, R-5Normal  5. Vitamin D deficiency  6. Chest tightness  -     Piter Rowe MD, Cardiology, [facility]  7. Encounter for screening mammogram for malignant neoplasm of breast  -     [facility] DIGITAL SCREEN W OR WO CAD BILATERAL; Future  8. Family history of colon cancer in father  -     Mortimer Dandy, MD, Gastroenterology, [facility]    Today non fasting. Will place order for labs and plan she will have done in 95  [facility] ice pack 4 times daily for 15-20 minutes. Wrap in towel, etc to avoid the coldness directly to the skin. Will refer to cardiology for further evaluation. Recommend using albuterol at least 2 times daily on routine basis. At end of cold symptoms currently. Lab Results   Component Value Date    LABA1C 6.1 03/04/2021     Lab Results   Component Value Date    EAG 91.1 12/09/2009     Will refer to GI given fathers hx of colon cancer at age 52. Brother has had his colonoscopy and is reported to be normal.     Return in about 6 months (around 9/4/2021).     An electronic signature was used to authenticate this note.    --LOC SAHU APRN - CNP on 3/4/2021 at 10:50 AM

## 2021-03-04 NOTE — TELEPHONE ENCOUNTER
Pt was in for physical today. She called back to say that her 2 children tested positive for covid. Pt started with her sx on 2/25, her children's sx started after that. She would like to know if there is something else she should do. Gave her the number to schedule a test for covid. Pt received a text from her pharmacy saying that the inhaler that was called in is on order. Pt would like to know if she should go elsewhere to get it/if prescription can be sent somewhere else. [facility]; she will call them to find out if they have it in stock, and if they do, ask them to fill it. She will call back if they don't have it either.   [phone]

## 2021-03-05 NOTE — TELEPHONE ENCOUNTER
I can send the inhaler to any pharmacy. She will just need to let me know where. Reina should consider being tested as well given her hx of asthma. Please give her the information to obtain testing.

## 2021-03-05 NOTE — TELEPHONE ENCOUNTER
Called and spoke with pt and she states that she had the pharmacy transfer the inhaler to a different pharmacy and pt picked up [address]. Pt was given the COVID number last night from who ever she spoke with and has an appt today. Pt was also concerned that the one she got states that she shouldn't be taking that if she is diabetic and had has HBP. Please advise.

## 2021-03-23 ENCOUNTER — HOSPITAL ENCOUNTER (OUTPATIENT)
Age: 46
Discharge: HOME | End: 2021-03-23
Payer: COMMERCIAL

## 2021-03-23 DIAGNOSIS — J45.20 MILD INTERMITTENT ASTHMA WITHOUT COMPLICATION: ICD-10-CM

## 2021-03-23 DIAGNOSIS — E78.00 PURE HYPERCHOLESTEROLEMIA: ICD-10-CM

## 2021-03-23 DIAGNOSIS — I10 ESSENTIAL HYPERTENSION: ICD-10-CM

## 2021-03-23 LAB
A/G RATIO: 1.7 (ref 1.1–2.2)
ALBUMIN SERPL-MCNC: 4.4 G/DL (ref 3.4–5)
ALP BLD-CCNC: 73 U/L (ref 40–129)
ALT SERPL-CCNC: 11 U/L (ref 10–40)
ANION GAP SERPL CALCULATED.3IONS-SCNC: 13 MMOL/L (ref 3–16)
AST SERPL-CCNC: 12 U/L (ref 15–37)
BASOPHILS ABSOLUTE: 0.1 K/UL (ref 0–0.2)
BASOPHILS RELATIVE PERCENT: 0.6 %
BILIRUB SERPL-MCNC: 0.5 MG/DL (ref 0–1)
BUN BLDV-MCNC: 17 MG/DL (ref 7–20)
CALCIUM SERPL-MCNC: 9.4 MG/DL (ref 8.3–10.6)
CHLORIDE BLD-SCNC: 104 MMOL/L (ref 99–110)
CHOLESTEROL, TOTAL: 222 MG/DL (ref 0–199)
CO2: 22 MMOL/L (ref 21–32)
CREAT SERPL-MCNC: 0.7 MG/DL (ref 0.6–1.1)
EOSINOPHILS ABSOLUTE: 0.2 K/UL (ref 0–0.6)
EOSINOPHILS RELATIVE PERCENT: 2.9 %
GFR AFRICAN AMERICAN: >60
GFR NON-AFRICAN AMERICAN: >60
GLOBULIN: 2.6 G/DL
GLUCOSE BLD-MCNC: 162 MG/DL (ref 70–99)
HCT VFR BLD CALC: 40.3 % (ref 36–48)
HDLC SERPL-MCNC: 50 MG/DL (ref 40–60)
HEMOGLOBIN: 14 G/DL (ref 12–16)
LDL CHOLESTEROL CALCULATED: 148 MG/DL
LYMPHOCYTES ABSOLUTE: 1.4 K/UL (ref 1–5.1)
LYMPHOCYTES RELATIVE PERCENT: 16.5 %
MCH RBC QN AUTO: 31.6 PG (ref 26–34)
MCHC RBC AUTO-ENTMCNC: 34.6 G/DL (ref 31–36)
MCV RBC AUTO: 91.2 FL (ref 80–100)
MONOCYTES ABSOLUTE: 0.8 K/UL (ref 0–1.3)
MONOCYTES RELATIVE PERCENT: 10 %
NEUTROPHILS ABSOLUTE: 5.8 K/UL (ref 1.7–7.7)
NEUTROPHILS RELATIVE PERCENT: 70 %
PDW BLD-RTO: 12.9 % (ref 12.4–15.4)
PLATELET # BLD: 267 K/UL (ref 135–450)
PMV BLD AUTO: 9.5 FL (ref 5–10.5)
POTASSIUM SERPL-SCNC: 4.4 MMOL/L (ref 3.5–5.1)
RBC # BLD: 4.42 M/UL (ref 4–5.2)
SODIUM BLD-SCNC: 139 MMOL/L (ref 136–145)
TOTAL PROTEIN: 7 G/DL (ref 6.4–8.2)
TRIGL SERPL-MCNC: 118 MG/DL (ref 0–150)
VLDLC SERPL CALC-MCNC: 24 MG/DL
WBC # BLD: 8.4 K/UL (ref 4–11)

## 2021-03-23 PROCEDURE — 36415 COLL VENOUS BLD VENIPUNCTURE: CPT

## 2021-03-23 PROCEDURE — 80053 COMPREHEN METABOLIC PANEL: CPT

## 2021-03-23 PROCEDURE — 85025 COMPLETE CBC W/AUTO DIFF WBC: CPT

## 2021-03-23 PROCEDURE — 80061 LIPID PANEL: CPT

## 2021-04-02 ENCOUNTER — OFFICE VISIT (OUTPATIENT)
Dept: CARDIOLOGY CLINIC | Age: 46
End: 2021-04-02
Payer: COMMERCIAL

## 2021-04-02 ENCOUNTER — TELEPHONE (OUTPATIENT)
Dept: CARDIOLOGY CLINIC | Age: 46
End: 2021-04-02

## 2021-04-02 VITALS
OXYGEN SATURATION: 98 % | WEIGHT: 152 LBS | DIASTOLIC BLOOD PRESSURE: 88 MMHG | BODY MASS INDEX: 27.97 KG/M2 | HEART RATE: 86 BPM | SYSTOLIC BLOOD PRESSURE: 138 MMHG | HEIGHT: 62 IN

## 2021-04-02 DIAGNOSIS — I20.0 UNSTABLE ANGINA PECTORIS (HCC): ICD-10-CM

## 2021-04-02 DIAGNOSIS — R00.2 PALPITATIONS: ICD-10-CM

## 2021-04-02 DIAGNOSIS — R07.2 PRECORDIAL PAIN: ICD-10-CM

## 2021-04-02 DIAGNOSIS — I10 ESSENTIAL HYPERTENSION: Primary | ICD-10-CM

## 2021-04-02 DIAGNOSIS — E78.00 PURE HYPERCHOLESTEROLEMIA: ICD-10-CM

## 2021-04-02 PROCEDURE — G8427 DOCREV CUR MEDS BY ELIG CLIN: HCPCS | Performed by: INTERNAL MEDICINE

## 2021-04-02 PROCEDURE — 99244 OFF/OP CNSLTJ NEW/EST MOD 40: CPT | Performed by: INTERNAL MEDICINE

## 2021-04-02 PROCEDURE — G8419 CALC BMI OUT NRM PARAM NOF/U: HCPCS | Performed by: INTERNAL MEDICINE

## 2021-04-02 PROCEDURE — 93000 ELECTROCARDIOGRAM COMPLETE: CPT | Performed by: INTERNAL MEDICINE

## 2021-04-02 NOTE — TELEPHONE ENCOUNTER
Monitor placed by Sebas Feliz ordered monitor to be mailed to the pt home. Monitor company [employer]  Length of monitor 7 day  Monitor ordered by Reid Rodriguez  Serial number   Kit ID   Activation successful prior to pt leaving office?  NA

## 2021-04-02 NOTE — PROGRESS NOTES
[address]   Cardiac Consultation    Referring Provider:  BARB Kitchen CNP     Chief Complaint   Patient presents with    New Patient    Chest Pain     occ cp    Shortness of Breath    Edema     due to diabetes        History of Present Illness:    Mitch Davis is a 50 y.o. female who is here today as a NEW patient consult for cardiology, referred by BARB Kitchen CNP for chest pain. She has a past medical history of asthma, hypertension, hyperlipidemia, diabetes mellitus. Today she states she had been having chest pain, SOB and palpitations. She states chest pain feels like a pressure on her chest. She had chest pain in the past that was sharp and radiated into her jaw. Chest pain is mostly random and does not seem related to activity or exertion, last for a few minutes and resolves. Has assoc SOB at times. Can occur daily. Has increased over months. She has palpitations that are worse in the mornings and also if her blood sugar is low at all. Feels like her heart is racing. Patient currently denies any weight gain, edema, shortness of breath, dizziness, and syncope. Past Medical History:   has a past medical history of DM (diabetes mellitus) ([facility] [address] and Hypertension. Surgical History:   has no past surgical history on file. Social History:   reports that she has never smoked. She has never used smokeless tobacco. She reports current alcohol use. She reports that she does not use drugs. Family History:  family history includes Cancer in her paternal grandfather; Cancer (age of onset: 52) in her father; Dementia in her maternal grandfather; Diabetes in her brother, father, maternal grandmother, and paternal grandmother; Heart Attack in her father; Heart Failure in her paternal grandmother; Hypertension in her father, maternal grandfather, maternal grandmother, and mother; Other in her brother, father, and maternal grandfather.      Home Medications: Prior to Admission medications    Medication Sig Start Date End Date Taking? Authorizing Provider   Biotin 1000 MCG TABS Take 1 tablet by mouth daily   Yes Historical Provider, MD   labetalol (NORMODYNE) 200 MG tablet TAKE 1 TABLET BY MOUTH TWICE A DAY 3/4/21  Yes BARB Amador CNP   albuterol sulfate (PROAIR RESPICLICK) 923 (90 Base) MCG/ACT aerosol powder inhalation Inhale 2 puffs into the lungs every 6 hours as needed for Wheezing or Shortness of Breath 3/4/21  Yes BARB Junior CNP   vitamin D (CHOLECALCIFEROL) 125 MCG (5000 UT) CAPS capsule Take 1 capsule by mouth daily 2/18/21  Yes BARB Amador CNP   blood glucose test strips (ASCENSIA AUTODISC VI;ONE TOUCH ULTRA TEST VI) strip DX: E11.9 Brand approved by insurance. FSBS daily 7/10/19  Yes BARB Junior CNP   blood glucose monitor kit and supplies by Other route daily E11.9. FSBS daily. Brand covered by insurance 7/8/19  Yes BARB Junior CNP   acetaminophen (TYLENOL) 325 MG tablet Take 650 mg by mouth every 6 hours as needed for Pain   Yes Historical Provider, MD   Multiple Vitamins-Minerals (THERAPEUTIC MULTIVITAMIN-MINERALS) tablet Take 1 tablet by mouth daily   Yes Historical Provider, MD   Lancets MISC 1 each by Does not apply route 2 times daily DX: E 11.9 FSBS daily. Brand approved by insurance. 7/11/19 3/4/21  BARB Junior CNP        Allergies:  Eggs or egg-derived products     Review of Systems:   · Constitutional: there has been no unanticipated weight loss. There's been no change in energy level, sleep pattern, or activity level. · Eyes: No visual changes or diplopia. No scleral icterus. · ENT: No Headaches, hearing loss or vertigo. No mouth sores or sore throat. · Cardiovascular: Reviewed in HPI  · Respiratory: No cough or wheezing, no sputum production. No hematemesis. · Gastrointestinal: No abdominal pain, appetite loss, blood in stools.  No change in bowel or bladder habits. · Genitourinary: No dysuria, trouble voiding, or hematuria. · Musculoskeletal:  No gait disturbance, weakness or joint complaints. · Integumentary: No rash or pruritis. · Neurological: No headache, diplopia, change in muscle strength, numbness or tingling. No change in gait, balance, coordination, mood, affect, memory, mentation, behavior. · Psychiatric: No anxiety, no depression. · Endocrine: No malaise, fatigue or temperature intolerance. No excessive thirst, fluid intake, or urination. No tremor. · Hematologic/Lymphatic: No abnormal bruising or bleeding, blood clots or swollen lymph nodes. · Allergic/Immunologic: No nasal congestion or hives. Physical Examination:    Vitals:    04/02/21 1126   BP: 138/88   Pulse: 86   SpO2: 98%        Constitutional and General Appearance: NAD   Respiratory:  · Normal excursion and expansion without use of accessory muscles  · Resp Auscultation: Normal breath sounds without dullness  Cardiovascular:  · The apical impulses not displaced  · Heart tones are crisp and normal  · Cervical veins are not engorged  · The carotid upstroke is normal in amplitude and contour without delay or bruit  · Normal S1S2, No S3, No Murmur  · Peripheral pulses are symmetrical and full  · There is no clubbing, cyanosis of the extremities. · No edema  · Femoral Arteries: 2+ and equal  · Pedal Pulses: 2+ and equal   Abdomen:  · No masses or tenderness  · Liver/Spleen: No Abnormalities Noted  Neurological/Psychiatric:  · Alert and oriented in all spheres  · Moves all extremities well  · Exhibits normal gait balance and coordination  · No abnormalities of mood, affect, memory, mentation, or behavior are noted    EKG 5/2019  Sinus  Rhythm   -  Nonspecific T-abnormality. Assessment:   Chest pain - typical/atypical   Abnormal EKG  SOB - possible cardiac   Palpitations  - possible arrhythmia   Hypertension - suboptimal. POP recently increased meds.  Will monitor Hyperlipidemia - suboptimal. Declines statin due to pregnancy risk   Diabetes Mellitus   Family HX of heart diease in father   Knee pain     Plan:  Stress test- Lexiscan Myoview- she is not able to walk on a treadmill   Echocardiogram   7 day CAM monitor- this will be mailed to you   Continue current medications   Check blood pressure at home weekly  Regular exercise and following a healthy diet encouraged   Follow up with me in 4 weeks     The scribes documentation has been prepared under my direction and personally reviewed by me in its entirety. I confirm that the note above accurately reflects all work, treatment, procedures, and medical decision making performed by me. Dr. Pallavi Marshall MD    Scribe's attestation: This note was scribed in the presence of Dr. Pallavi Marshall M.D. By Dion Wick RN    Thank you for allowing me to participate in the care of this individual.      Sridhar Perdomo.  Gayatri Reardon M.D., Xavier Jackson

## 2021-04-02 NOTE — PATIENT INSTRUCTIONS
Plan:  Stress test- Lexiscan Myoview   Echocardiogram   7 day CAM monitor- this will be mailed to you   Continue current medications   Check blood pressure at home weekly  Regular exercise and following a healthy diet encouraged   Follow up with me in 4 weeks     Your provider has ordered testing for further evaluation. An order/prescription has been included in your paper work.  To schedule outpatient testing, contact Central Scheduling by calling [employer] ([phone]).

## 2021-04-02 NOTE — LETTER
[facility], [place], operated by [facility]   Cardiac Consultation    Referring Provider:  BARB Cheema CNP     Chief Complaint   Patient presents with    New Patient    Chest Pain     occ cp    Shortness of Breath    Edema     due to diabetes        History of Present Illness:    Marlena Sanchez is a 50 y.o. female who is here today as a NEW patient consult for cardiology, referred by BARB Cheema CNP for chest pain. She has a past medical history of asthma, hypertension, hyperlipidemia, diabetes mellitus. Today she states she had been having chest pain, SOB and palpitations. She states chest pain feels like a pressure on her chest. She had chest pain in the past that was sharp and radiated into her jaw. Chest pain is mostly random and does not seem related to activity or exertion, last for a few minutes and resolves. Has assoc SOB at times. Can occur daily. Has increased over months. She has palpitations that are worse in the mornings and also if her blood sugar is low at all. Feels like her heart is racing. Patient currently denies any weight gain, edema, shortness of breath, dizziness, and syncope. Past Medical History:   has a past medical history of DM (diabetes mellitus) ([facility] [address] and Hypertension. Surgical History:   has no past surgical history on file. Social History:   reports that she has never smoked. She has never used smokeless tobacco. She reports current alcohol use. She reports that she does not use drugs. Family History:  family history includes Cancer in her paternal grandfather; Cancer (age of onset: 52) in her father; Dementia in her maternal grandfather; Diabetes in her brother, father, maternal grandmother, and paternal grandmother; Heart Attack in her father; Heart Failure in her paternal grandmother; Hypertension in her father, maternal grandfather, maternal grandmother, and mother; Other in her brother, father, and maternal grandfather.      Home Medications:  Prior to Admission medications    Medication Sig Start Date End Date Taking? Authorizing Provider   Biotin 1000 MCG TABS Take 1 tablet by mouth daily   Yes Historical Provider, MD   labetalol (NORMODYNE) 200 MG tablet TAKE 1 TABLET BY MOUTH TWICE A DAY 3/4/21  Yes BARB Amador CNP   albuterol sulfate (PROAIR RESPICLICK) 305 (90 Base) MCG/ACT aerosol powder inhalation Inhale 2 puffs into the lungs every 6 hours as needed for Wheezing or Shortness of Breath 3/4/21  Yes BARB Jay CNP   vitamin D (CHOLECALCIFEROL) 125 MCG (5000 UT) CAPS capsule Take 1 capsule by mouth daily 2/18/21  Yes BARB Amador CNP   blood glucose test strips (ASCENSIA AUTODISC VI;ONE TOUCH ULTRA TEST VI) strip DX: E11.9 Brand approved by insurance. FSBS daily 7/10/19  Yes BARB Jay CNP   blood glucose monitor kit and supplies by Other route daily E11.9. FSBS daily. Brand covered by insurance 7/8/19  Yes BARB Jay CNP   acetaminophen (TYLENOL) 325 MG tablet Take 650 mg by mouth every 6 hours as needed for Pain   Yes Historical Provider, MD   Multiple Vitamins-Minerals (THERAPEUTIC MULTIVITAMIN-MINERALS) tablet Take 1 tablet by mouth daily   Yes Historical Provider, MD   Lancets MISC 1 each by Does not apply route 2 times daily DX: E 11.9 FSBS daily. Brand approved by insurance. 7/11/19 3/4/21  BARB Jay CNP        Allergies:  Eggs or egg-derived products     Review of Systems:   · Constitutional: there has been no unanticipated weight loss. There's been no change in energy level, sleep pattern, or activity level. · Eyes: No visual changes or diplopia. No scleral icterus. · ENT: No Headaches, hearing loss or vertigo. No mouth sores or sore throat. · Cardiovascular: Reviewed in HPI  · Respiratory: No cough or wheezing, no sputum production. No hematemesis. · Gastrointestinal: No abdominal pain, appetite loss, blood in stools. No change in bowel or bladder habits. · Genitourinary: No dysuria, trouble voiding, or hematuria. · Musculoskeletal:  No gait disturbance, weakness or joint complaints. · Integumentary: No rash or pruritis. · Neurological: No headache, diplopia, change in muscle strength, numbness or tingling. No change in gait, balance, coordination, mood, affect, memory, mentation, behavior. · Psychiatric: No anxiety, no depression. · Endocrine: No malaise, fatigue or temperature intolerance. No excessive thirst, fluid intake, or urination. No tremor. · Hematologic/Lymphatic: No abnormal bruising or bleeding, blood clots or swollen lymph nodes. · Allergic/Immunologic: No nasal congestion or hives. Physical Examination:    Vitals:    04/02/21 1126   BP: 138/88   Pulse: 86   SpO2: 98%        Constitutional and General Appearance: NAD   Respiratory:  · Normal excursion and expansion without use of accessory muscles  · Resp Auscultation: Normal breath sounds without dullness  Cardiovascular:  · The apical impulses not displaced  · Heart tones are crisp and normal  · Cervical veins are not engorged  · The carotid upstroke is normal in amplitude and contour without delay or bruit  · Normal S1S2, No S3, No Murmur  · Peripheral pulses are symmetrical and full  · There is no clubbing, cyanosis of the extremities. · No edema  · Femoral Arteries: 2+ and equal  · Pedal Pulses: 2+ and equal   Abdomen:  · No masses or tenderness  · Liver/Spleen: No Abnormalities Noted  Neurological/Psychiatric:  · Alert and oriented in all spheres  · Moves all extremities well  · Exhibits normal gait balance and coordination  · No abnormalities of mood, affect, memory, mentation, or behavior are noted    EKG 5/2019  Sinus  Rhythm   -  Nonspecific T-abnormality. Assessment:   Chest pain - typical/atypical   Abnormal EKG  SOB - possible cardiac   Palpitations  - possible arrhythmia   Hypertension - suboptimal. POP recently increased meds.  Will monitor   Hyperlipidemia - suboptimal. Declines statin due to pregnancy risk   Diabetes Mellitus   Family HX of heart diease in father   Knee pain     Plan:  Stress test- Lexiscan Myoview- she is not able to walk on a treadmill   Echocardiogram   7 day CAM monitor- this will be mailed to you   Continue current medications   Check blood pressure at home weekly  Regular exercise and following a healthy diet encouraged   Follow up with me in 4 weeks     The scribes documentation has been prepared under my direction and personally reviewed by me in its entirety. I confirm that the note above accurately reflects all work, treatment, procedures, and medical decision making performed by me. Dr. Steff Chapman MD    Scribe's attestation: This note was scribed in the presence of Dr. Steff Chapman M.D. By Gen Greenwood RN    Thank you for allowing me to participate in the care of this individual.      Khalida Reyes.  Adal Soria M.D., Leydi Tarango

## 2021-04-13 ENCOUNTER — HOSPITAL ENCOUNTER (OUTPATIENT)
Dept: MAMMOGRAPHY | Age: 46
Discharge: HOME | End: 2021-04-13
Payer: COMMERCIAL

## 2021-04-13 DIAGNOSIS — Z12.31 ENCOUNTER FOR SCREENING MAMMOGRAM FOR MALIGNANT NEOPLASM OF BREAST: ICD-10-CM

## 2021-04-13 PROCEDURE — 77063 BREAST TOMOSYNTHESIS BI: CPT

## 2021-04-28 PROCEDURE — 93248 EXT ECG>7D<15D REV&INTERPJ: CPT | Performed by: INTERNAL MEDICINE

## 2021-05-03 ENCOUNTER — TELEPHONE (OUTPATIENT)
Dept: CARDIOLOGY CLINIC | Age: 46
End: 2021-05-03

## 2021-05-03 DIAGNOSIS — R00.2 PALPITATIONS: ICD-10-CM

## 2021-05-07 ENCOUNTER — HOSPITAL ENCOUNTER (OUTPATIENT)
Dept: NON INVASIVE DIAGNOSTICS | Age: 46
Discharge: HOME | End: 2021-05-07
Payer: COMMERCIAL

## 2021-05-07 ENCOUNTER — HOSPITAL ENCOUNTER (OUTPATIENT)
Dept: NUCLEAR MEDICINE | Age: 46
Discharge: HOME | End: 2021-05-07
Payer: COMMERCIAL

## 2021-05-07 DIAGNOSIS — I10 ESSENTIAL HYPERTENSION: ICD-10-CM

## 2021-05-07 DIAGNOSIS — R07.2 PRECORDIAL PAIN: ICD-10-CM

## 2021-05-07 LAB
LV EF: 55 %
LVEF MODALITY: NORMAL

## 2021-05-07 PROCEDURE — 3430000000 HC RX DIAGNOSTIC RADIOPHARMACEUTICAL: Performed by: INTERNAL MEDICINE

## 2021-05-07 PROCEDURE — 6360000002 HC RX W HCPCS: Performed by: INTERNAL MEDICINE

## 2021-05-07 PROCEDURE — 78452 HT MUSCLE IMAGE SPECT MULT: CPT

## 2021-05-07 PROCEDURE — 93306 TTE W/DOPPLER COMPLETE: CPT

## 2021-05-07 PROCEDURE — A9502 TC99M TETROFOSMIN: HCPCS | Performed by: INTERNAL MEDICINE

## 2021-05-07 RX ADMIN — REGADENOSON 0.4 MG: 0.08 INJECTION, SOLUTION INTRAVENOUS at 11:40

## 2021-05-07 RX ADMIN — TETROFOSMIN 10.2 MILLICURIE: 1.38 INJECTION, POWDER, LYOPHILIZED, FOR SOLUTION INTRAVENOUS at 10:41

## 2021-05-07 RX ADMIN — TETROFOSMIN 33 MILLICURIE: 1.38 INJECTION, POWDER, LYOPHILIZED, FOR SOLUTION INTRAVENOUS at 11:40

## 2021-05-10 ENCOUNTER — TELEPHONE (OUTPATIENT)
Dept: CARDIOLOGY CLINIC | Age: 46
End: 2021-05-10

## 2021-05-11 VITALS — BODY MASS INDEX: 26.3 KG/M2 | HEIGHT: 64 IN

## 2021-05-11 NOTE — TELEPHONE ENCOUNTER
Spoke with patient. Patient is scheduled with Dr. Lara Nelson for Left Heart Cath on 5/14/21 at [facility], arrival time of 6:30am to the Cath Lab. Please have patient arrive to the main entrance of [facility] and check in with the registration desk. Please call patient regarding medication instructions. Remind patient to be NPO after midnight (8 hours prior). Do not apply lotions/creams on skin the day of procedure. She cleared her VM - if needed leave a message. COVID testing - none. [address] [facility], [address] [facility].

## 2021-05-14 ENCOUNTER — HOSPITAL ENCOUNTER (OUTPATIENT)
Dept: CARDIAC CATH/INVASIVE PROCEDURES | Age: 46
Discharge: HOME | End: 2021-05-14
Attending: INTERNAL MEDICINE | Admitting: INTERNAL MEDICINE
Payer: COMMERCIAL

## 2021-05-14 VITALS — BODY MASS INDEX: 27.97 KG/M2 | HEIGHT: 62 IN | WEIGHT: 152 LBS

## 2021-05-14 LAB
ANION GAP SERPL CALCULATED.3IONS-SCNC: 10 MMOL/L (ref 3–16)
BUN BLDV-MCNC: 20 MG/DL (ref 7–20)
CALCIUM SERPL-MCNC: 9.3 MG/DL (ref 8.3–10.6)
CHLORIDE BLD-SCNC: 100 MMOL/L (ref 99–110)
CHOLESTEROL, TOTAL: 213 MG/DL (ref 0–199)
CO2: 23 MMOL/L (ref 21–32)
CREAT SERPL-MCNC: 0.7 MG/DL (ref 0.6–1.1)
EKG ATRIAL RATE: 72 BPM
EKG DIAGNOSIS: NORMAL
EKG P AXIS: 43 DEGREES
EKG P-R INTERVAL: 154 MS
EKG Q-T INTERVAL: 410 MS
EKG QRS DURATION: 82 MS
EKG QTC CALCULATION (BAZETT): 448 MS
EKG R AXIS: 21 DEGREES
EKG T AXIS: 36 DEGREES
EKG VENTRICULAR RATE: 72 BPM
GFR AFRICAN AMERICAN: >60
GFR NON-AFRICAN AMERICAN: >60
GLUCOSE BLD-MCNC: 209 MG/DL (ref 70–99)
HCG QUALITATIVE: NEGATIVE
HCT VFR BLD CALC: 40.4 % (ref 36–48)
HDLC SERPL-MCNC: 49 MG/DL (ref 40–60)
HEMOGLOBIN: 14.1 G/DL (ref 12–16)
INR BLD: 0.99 (ref 0.86–1.14)
LDL CHOLESTEROL CALCULATED: 147 MG/DL
MCH RBC QN AUTO: 31.4 PG (ref 26–34)
MCHC RBC AUTO-ENTMCNC: 34.8 G/DL (ref 31–36)
MCV RBC AUTO: 90.4 FL (ref 80–100)
PDW BLD-RTO: 12.8 % (ref 12.4–15.4)
PLATELET # BLD: 292 K/UL (ref 135–450)
PMV BLD AUTO: 8.2 FL (ref 5–10.5)
POTASSIUM SERPL-SCNC: 4 MMOL/L (ref 3.5–5.1)
PROTHROMBIN TIME: 11.5 SEC (ref 10–13.2)
RBC # BLD: 4.47 M/UL (ref 4–5.2)
SODIUM BLD-SCNC: 133 MMOL/L (ref 136–145)
TRIGL SERPL-MCNC: 84 MG/DL (ref 0–150)
VLDLC SERPL CALC-MCNC: 17 MG/DL
WBC # BLD: 8.5 K/UL (ref 4–11)

## 2021-05-14 PROCEDURE — 80061 LIPID PANEL: CPT

## 2021-05-14 PROCEDURE — 93010 ELECTROCARDIOGRAM REPORT: CPT | Performed by: INTERNAL MEDICINE

## 2021-05-14 PROCEDURE — 6360000002 HC RX W HCPCS

## 2021-05-14 PROCEDURE — 6360000004 HC RX CONTRAST MEDICATION

## 2021-05-14 PROCEDURE — 84703 CHORIONIC GONADOTROPIN ASSAY: CPT

## 2021-05-14 PROCEDURE — 6370000000 HC RX 637 (ALT 250 FOR IP)

## 2021-05-14 PROCEDURE — 2709999900 HC NON-CHARGEABLE SUPPLY

## 2021-05-14 PROCEDURE — C1769 GUIDE WIRE: HCPCS

## 2021-05-14 PROCEDURE — 80048 BASIC METABOLIC PNL TOTAL CA: CPT

## 2021-05-14 PROCEDURE — 2500000003 HC RX 250 WO HCPCS

## 2021-05-14 PROCEDURE — C1894 INTRO/SHEATH, NON-LASER: HCPCS

## 2021-05-14 PROCEDURE — 99152 MOD SED SAME PHYS/QHP 5/>YRS: CPT

## 2021-05-14 PROCEDURE — 93458 L HRT ARTERY/VENTRICLE ANGIO: CPT

## 2021-05-14 PROCEDURE — 93005 ELECTROCARDIOGRAM TRACING: CPT | Performed by: INTERNAL MEDICINE

## 2021-05-14 PROCEDURE — 85610 PROTHROMBIN TIME: CPT

## 2021-05-14 PROCEDURE — 85027 COMPLETE CBC AUTOMATED: CPT

## 2021-05-14 PROCEDURE — 93458 L HRT ARTERY/VENTRICLE ANGIO: CPT | Performed by: INTERNAL MEDICINE

## 2021-05-14 PROCEDURE — 99152 MOD SED SAME PHYS/QHP 5/>YRS: CPT | Performed by: INTERNAL MEDICINE

## 2021-05-14 RX ORDER — HEPARIN SODIUM 1000 [USP'U]/ML
INJECTION, SOLUTION INTRAVENOUS; SUBCUTANEOUS
Status: COMPLETED | OUTPATIENT
Start: 2021-05-14 | End: 2021-05-14

## 2021-05-14 RX ORDER — ACETAMINOPHEN 325 MG/1
650 TABLET ORAL ONCE
Status: COMPLETED | OUTPATIENT
Start: 2021-05-14 | End: 2021-05-14

## 2021-05-14 RX ORDER — ATORVASTATIN CALCIUM 10 MG/1
10 TABLET, FILM COATED ORAL DAILY
Qty: 30 TABLET | Refills: 3 | Status: SHIPPED | OUTPATIENT
Start: 2021-05-14 | End: 2021-05-28 | Stop reason: SDUPTHER

## 2021-05-14 RX ORDER — SODIUM CHLORIDE 9 MG/ML
25 INJECTION, SOLUTION INTRAVENOUS PRN
Status: DISCONTINUED | OUTPATIENT
Start: 2021-05-14 | End: 2021-05-14 | Stop reason: HOSPADM

## 2021-05-14 RX ORDER — SODIUM CHLORIDE 0.9 % (FLUSH) 0.9 %
5-40 SYRINGE (ML) INJECTION EVERY 12 HOURS SCHEDULED
Status: DISCONTINUED | OUTPATIENT
Start: 2021-05-14 | End: 2021-05-14 | Stop reason: HOSPADM

## 2021-05-14 RX ORDER — MIDAZOLAM HYDROCHLORIDE 5 MG/ML
INJECTION INTRAMUSCULAR; INTRAVENOUS
Status: COMPLETED | OUTPATIENT
Start: 2021-05-14 | End: 2021-05-14

## 2021-05-14 RX ORDER — ONDANSETRON 2 MG/ML
4 INJECTION INTRAMUSCULAR; INTRAVENOUS ONCE
Status: COMPLETED | OUTPATIENT
Start: 2021-05-14 | End: 2021-05-14

## 2021-05-14 RX ORDER — FENTANYL CITRATE 50 UG/ML
INJECTION, SOLUTION INTRAMUSCULAR; INTRAVENOUS
Status: COMPLETED | OUTPATIENT
Start: 2021-05-14 | End: 2021-05-14

## 2021-05-14 RX ORDER — ASPIRIN 81 MG/1
81 TABLET ORAL DAILY
Qty: 90 TABLET | Refills: 1 | Status: SHIPPED | OUTPATIENT
Start: 2021-05-14 | End: 2022-04-06

## 2021-05-14 RX ORDER — SODIUM CHLORIDE 0.9 % (FLUSH) 0.9 %
5-40 SYRINGE (ML) INJECTION PRN
Status: DISCONTINUED | OUTPATIENT
Start: 2021-05-14 | End: 2021-05-14 | Stop reason: HOSPADM

## 2021-05-14 RX ORDER — SODIUM CHLORIDE 9 MG/ML
1000 INJECTION, SOLUTION INTRAVENOUS CONTINUOUS
Status: DISCONTINUED | OUTPATIENT
Start: 2021-05-14 | End: 2021-05-14 | Stop reason: HOSPADM

## 2021-05-14 RX ADMIN — FENTANYL CITRATE 25 MCG: 50 INJECTION, SOLUTION INTRAMUSCULAR; INTRAVENOUS at 08:44

## 2021-05-14 RX ADMIN — ONDANSETRON 4 MG: 2 INJECTION INTRAMUSCULAR; INTRAVENOUS at 12:17

## 2021-05-14 RX ADMIN — HEPARIN SODIUM 3000 UNITS: 1000 INJECTION, SOLUTION INTRAVENOUS; SUBCUTANEOUS at 08:44

## 2021-05-14 RX ADMIN — FENTANYL CITRATE 25 MCG: 50 INJECTION, SOLUTION INTRAMUSCULAR; INTRAVENOUS at 08:31

## 2021-05-14 RX ADMIN — MIDAZOLAM HYDROCHLORIDE 1 MG: 5 INJECTION INTRAMUSCULAR; INTRAVENOUS at 08:43

## 2021-05-14 RX ADMIN — ACETAMINOPHEN 650 MG: 325 TABLET ORAL at 12:03

## 2021-05-14 RX ADMIN — MIDAZOLAM HYDROCHLORIDE 2 MG: 5 INJECTION INTRAMUSCULAR; INTRAVENOUS at 08:31

## 2021-05-14 RX ADMIN — FENTANYL CITRATE 50 MCG: 50 INJECTION, SOLUTION INTRAMUSCULAR; INTRAVENOUS at 08:48

## 2021-05-14 RX ADMIN — MIDAZOLAM HYDROCHLORIDE 1 MG: 5 INJECTION INTRAMUSCULAR; INTRAVENOUS at 08:48

## 2021-05-14 NOTE — OP NOTE
Operative Note      Patient: Pamela Painter  YOB: 1975  MRN: [id number]      Cardiac Cath  Postoperative Note      1. Pre-operative Diagnosis: abnormal stress test        Post-operative Diagnosis: Same  2. Surgeons/Assistants: Erica Roche MD, [facility]  3. Complications: None  4. Estimated Blood Loss: less than 50   5. Specimens: Were Not Obtained  6. Anesthesia: Local and Moderate Sedation  For sedation: Moderated sedation was achieved with Versed (4mg) and Fentanyl (100mcg). Monitoring of the patient's vital signs and respiratory status was provided by a trained independent observer nurse during the entire course of the procedure(s) and under my direct supervision and recorded in patient's medical record. The duration of sedation was 0826 to 0852. 7. Procedure(s) performed: Please see Xims chart for more detailed information on any catheter or equipment use. Further details on the procedure, sedation and proceedings of case  Moderate sedation  [facility]  LVG  [place]ors        Procedure Details:  Consent Access  site Bleed Risk Sedat US   Used*? Contrast Flouro TIme Fluoro  mGy   Yes Rt radial artery Low MCSFC yes 60mL 5.2 584   *CPT 30128: If stated \"yes\", Ultrasound guidance was used to access Rt radial art using Seldinger technique after local infiltration of 1% lidocaine. Ultrasound(US) documented/visualized size, patency, pulsatility and exact location for puncture and determined ok to proceed. Real-time imaging used for needle entry into the vessel(s). Image was printed off [employer] and sent to medical records on a progress note.    *Sedation Note: MCSFC = minimal conscious sedation for comfort      Left Heart Cath:   Findings   LVEDP 10   LVEF 55%   LV wall motion Normal wall motion   Gradient across AV None   Mitral regurg none     Coronary Angiogram:  Artery Findings/Result   LM No angiographic CAD   LAD Mid 20%   LCx No angiographic CAD   RI No angiographic CAD   RCA Dominant, No angiographic CAD       Assessment/Plan  1. Mild nonobstructive CAD  2. Evaluate for other causes for cardiac chest pain. Given history coronary vasospasms may be possibility. 3.  For now we will start aspirin and statin for CV risk reduction.     Patient will follow Dr. Carolyn Soto      Electronically signed by Gloria Bailey MD on 5/14/2021 at 8:59 AM

## 2021-05-14 NOTE — H&P
Brief Pre-Op Note/Sedation Assessment      Lea Peterson  1975  Cath Pool Rm/NONE      [id number]  7:48 AM    Planned Procedure: Cardiac Catheterization Procedure    Post Procedure Plan: Return to same level of care    Consent: I have discussed with the patient and/or the patient representative the indication, alternatives, and the possible risks and/or complications of the planned procedure and the anesthesia methods. The patient and/or patient representative appear to understand and agree to proceed. Chief Complaint: Chest Pain/Pressure      Indications for Cath Procedure:  ACS > 24 hrs  Anginal Classification within 2 weeks:  CCS III - Symptoms with everyday living activities, i.e., moderate limitation  NYHA Heart Failure Class within 2 weeks: Class III - Symptoms of HF on less-than-ordinary exertion, Newly Diagnosed? No, Heart Failure Type: Diastolic  Is Cath Lab Visit Valve-related?: No  Surgical Risk: N/A  Functional Type: N/A    Anti- Anginal Meds within 2 weeks:   Yes: Aspirin    Stress or Imaging Studies Performed (within 6 months):  Stress Test with SPECT Result: Positive:  anterior Risk/Extent of Ischemia:  Intermediate     Vital Signs:  Ht 5' 2\" (1.575 m)   Wt 152 lb (68.9 kg)   BMI 27.80 kg/m²     Allergies: Allergies   Allergen Reactions    Eggs Or Egg-Derived Products Nausea Only     Yolk. Takes flu shot yearly without problems. Past Medical History:  Past Medical History:   Diagnosis Date    DM (diabetes mellitus) ([facility] [address]     Hypertension          Surgical History:  No past surgical history on file. Medications:  Current Facility-Administered Medications   Medication Dose Route Frequency Provider Last Rate Last Admin    0.9 % sodium chloride infusion  1,000 mL Intravenous Continuous Gabriela Segura MD               Pre-Sedation:    Pre-Sedation Documentation and Exam:  I have assessed the patient and agree with the H&P present on the chart.     Prior History of Anesthesia Complications:   none    Modified Mallampati:  II (soft palate, uvula, fauces visible)    ASA Classification:  Class 3 - A patient with severe systemic disease that limits activity but is not incapacitating      Xi Scale: Activity:  2 - Able to move 4 extremities voluntarily on command  Respiration:  2 - Able to breathe deeply and cough freely  Circulation:  2 - BP+/- 20mmHg of normal  Consciousness:  2 - Fully awake  Oxygen Saturation (color):  2 - Able to maintain oxygen saturation >92% on room air    Sedation/Anesthesia Plan:  Guard the patient's safety and welfare. Minimize physical discomfort and pain. Minimize negative psychological responses to treatment by providing sedation and analgesia and maximize the potential amnesia. Patient to meet pre-procedure discharge plan.     Medication Planned:  midazolam intravenously and fentanyl intravenously    Patient is an appropriate candidate for plan of sedation: yes      Electronically signed by Yonathan Romero MD on 5/14/2021 at 7:48 AM

## 2021-05-20 ENCOUNTER — TELEPHONE (OUTPATIENT)
Dept: CARDIOLOGY CLINIC | Age: 46
End: 2021-05-20

## 2021-05-20 NOTE — TELEPHONE ENCOUNTER
I called and spoke to patient. Sounds like she is doing a little bit better but sounds like she had a possible hematoma that occurred after discharge and is now resolving. No signs or symptoms of ischemia or limb compromise she does have a little bit of some bruising at the site and some discomfort but that is it.   I have asked her to use Tylenol and ice packs as needed if it is not better by Monday call our office will need to be seen sooner than later

## 2021-05-20 NOTE — TELEPHONE ENCOUNTER
Patient called stating that she had a left heart cath on 05/14/21. Since then having pain in her wrist, throbbing, bruising and slight swelling in her hand. Wants to know if this is normal. Wants to know if she can take Ibuprofen in addition to the Tylenol that she's already taking.

## 2021-05-27 NOTE — PROGRESS NOTES
[facility]   Cardiac Consultation    Referring Provider:  BARB Kitchen CNP     Chief Complaint   Patient presents with    Follow-Up from [facility]     5/14/21    Coronary Artery Disease    Hyperlipidemia        History of Present Illness:    Mitch Davis is a 50 y.o. female who is here today for hospital follow up S/P left heart cath. She was initially seen as a new patient on 4/2/2021 by request of BARB WIGGINS CNP for chest pain, SOB, and an abnormal EKG. She has a past medical history of asthma, hypertension, hyperlipidemia, diabetes mellitus. She had a stress test on 5/7/2021 which could not exclude small to moderate area of anterior ischemia so she underwent a left heart cath on 5/14/2021 which showed mild nonobstructive coronary artery disease. An echocardiogram from 5/7/2021 showed an EF of 55% with mild mitral regurgitation. She wore a GEOVANNY in 4/2021 which showed rare PAC's and PVC's. Today she states she has been feeling well overall. She has some pain and throbbing at her radial artery site that has started to decrease now. No loss of strength or sensation. She states her blood sugar is labile and she has seen endocrinology in the past. She is not currently taking a Statin due to concerns she may get pregnant again. She is currently taking an Aspirin 81 mg daily. She checks her blood pressure at home and it is elevated prior to taking her medications. Patient currently denies any weight gain, edema, palpitations, chest pain, shortness of breath, dizziness, and syncope. Past Medical History:   has a past medical history of Chest pain, DM (diabetes mellitus) ([address] and Hypertension. Surgical History:   has a past surgical history that includes Cardiac catheterization (05/14/2021). Social History:   reports that she has never smoked. She has never used smokeless tobacco. She reports current alcohol use. She reports that she does not use drugs. Family History:  family history includes Breast Cancer in her paternal aunt, paternal grandfather, and paternal uncle; Cancer in her paternal grandfather; Cancer (age of onset: 52) in her father; Dementia in her maternal grandfather; Diabetes in her brother, father, maternal grandmother, and paternal grandmother; Heart Attack in her father; Heart Failure in her paternal grandmother; Hypertension in her father, maternal grandfather, maternal grandmother, and mother; Other in her brother, father, and maternal grandfather. Home Medications:  Prior to Admission medications    Medication Sig Start Date End Date Taking? Authorizing Provider   aspirin EC 81 MG EC tablet Take 1 tablet by mouth daily 5/14/21  Yes Zunilda Napoles MD   atorvastatin (LIPITOR) 10 MG tablet Take 1 tablet by mouth daily 5/14/21  Yes Zunilda Napoles MD   [facility] D3 125 MCG (5000 UT) CAPS capsule TAKE 1 CAPSULE BY MOUTH EVERY DAY 4/19/21  Yes Maira Dills, APRN - CNP   Biotin 1000 MCG TABS Take 1 tablet by mouth daily   Yes Historical Provider, MD   labetalol (NORMODYNE) 200 MG tablet TAKE 1 TABLET BY MOUTH TWICE A DAY 3/4/21  Yes Maira Dills, APRN - CNP   albuterol sulfate (PROAIR RESPICLICK) 207 (90 Base) MCG/ACT aerosol powder inhalation Inhale 2 puffs into the lungs every 6 hours as needed for Wheezing or Shortness of Breath 3/4/21  Yes Maira Dills, APRN - CNP   blood glucose test strips (ASCENSIA AUTODISC VI;ONE TOUCH ULTRA TEST VI) strip DX: E11.9 Brand approved by insurance. FSBS daily 7/10/19  Yes Maira Dills, APRN - CNP   blood glucose monitor kit and supplies by Other route daily E11.9. FSBS daily.  Brand covered by insurance 7/8/19  Yes Maira Dills, APRN - CNP   acetaminophen (TYLENOL) 325 MG tablet Take 650 mg by mouth every 6 hours as needed for Pain   Yes Historical Provider, MD   Multiple Vitamins-Minerals (THERAPEUTIC MULTIVITAMIN-MINERALS) tablet Take 1 tablet by mouth daily   Yes Historical Provider, MD   Lancets MISC 1 each by Does not apply route 2 times daily DX: E 11.9 FSBS daily. Brand approved by insurance. 7/11/19 3/4/21  BARB Young CNP        Allergies:  Eggs or egg-derived products     Review of Systems:   · Constitutional: there has been no unanticipated weight loss. There's been no change in energy level, sleep pattern, or activity level. · Eyes: No visual changes or diplopia. No scleral icterus. · ENT: No Headaches, hearing loss or vertigo. No mouth sores or sore throat. · Cardiovascular: Reviewed in HPI  · Respiratory: No cough or wheezing, no sputum production. No hematemesis. · Gastrointestinal: No abdominal pain, appetite loss, blood in stools. No change in bowel or bladder habits. · Genitourinary: No dysuria, trouble voiding, or hematuria. · Musculoskeletal:  No gait disturbance, weakness or joint complaints. · Integumentary: No rash or pruritis. · Neurological: No headache, diplopia, change in muscle strength, numbness or tingling. No change in gait, balance, coordination, mood, affect, memory, mentation, behavior. · Psychiatric: No anxiety, no depression. · Endocrine: No malaise, fatigue or temperature intolerance. No excessive thirst, fluid intake, or urination. No tremor. · Hematologic/Lymphatic: No abnormal bruising or bleeding, blood clots or swollen lymph nodes. · Allergic/Immunologic: No nasal congestion or hives.     Physical Examination:    Vitals:    05/28/21 0858   BP: 112/80   Pulse: 84   SpO2: 97%        Constitutional and General Appearance: NAD   Respiratory:  · Normal excursion and expansion without use of accessory muscles  · Resp Auscultation: Normal breath sounds without dullness  Cardiovascular:  · The apical impulses not displaced  · Heart tones are crisp and normal  · Cervical veins are not engorged  · The carotid upstroke is normal in amplitude and contour without delay or bruit  · Normal S1S2, No S3, No Murmur  · Peripheral pulses are symmetrical and full, loss of right radial pulse   · There is no clubbing, cyanosis of the extremities. · No edema  · Femoral Arteries: 2+ and equal  · Pedal Pulses: 2+ and equal   Abdomen:  · No masses or tenderness  · Liver/Spleen: No Abnormalities Noted  Neurological/Psychiatric:  · Alert and oriented in all spheres  · Moves all extremities well  · Exhibits normal gait balance and coordination  · No abnormalities of mood, affect, memory, mentation, or behavior are noted    Cath site: no palpable radial pulse. + palpable ulnar pulse. Mild ecchymosis at site. Normal strength and sensation. EKG 5/2019  Sinus  Rhythm   -  Nonspecific T-abnormality. Stress test 5/7/2021  Conclusions    Summary  Normal LVEF >60%  Normal wall motion  Diaphragmatic and breast attenuation artifact  Cannot exclude small to moderate area of anterior ischemia    Overall, this would be considered an abnormal, low to intermediate risk,  study     Echocardiogram 5/7/2021  Summary   LV systolic function is normal with EF estimated at 55%. No regional wall motion abnormalities. Normal left ventricular diastolic filling pressure. Mild mitral regurgitation. Trivial aortic and tricuspid regurgitation. Inadequate tricuspid regurgitation jet to estimate systolic artery pressure    Left heart cath 5/14/2021  Left Heart Cath:    Findings   LVEDP 10   LVEF 55%   LV wall motion Normal wall motion   Gradient across AV None   Mitral regurg none      Coronary Angiogram:  Artery Findings/Result   LM No angiographic CAD   LAD Mid 20%   LCx No angiographic CAD   RI No angiographic CAD   RCA Dominant, No angiographic CAD         Assessment/Plan  1. Mild nonobstructive CAD  2. Evaluate for other causes for cardiac chest pain. Given history coronary vasospasms may be possibility.   3.  For now we will start aspirin and statin for CV risk reduction.     GEOVANNY 4/2021  Rare PVC's and PAC's       Assessment:   Suspected radial artery occlusion  Right arm pain post cath - improving  Coronary artery disease - mild, stable  Chest pain - typical/atypical   Abnormal EKG  Mild mitral regurgitation. Palpitations  - PAC, PVC, AT. Stable. Om BBlk  Hypertension - controlled  Hyperlipidemia - suboptimal. Recommend statin. R/B/A/E discussed, including possible risk w/ pregnancy. She will d/w gynecologist and decide. Diabetes Mellitus   Family HX of heart diease in father     Plan:  Arterial US of right radial artery - stat  Cont ASA - R/B/A/E discussed   Recommend increasing Lipitor to 40 mg daily. She would like to discuss this with her OBGYN but will send script in. You can try to take Co Q 10 if you have muscle aches   Cardiac medications reviewed including indications and pertinent side effects    Check blood pressure and heart rate at home a few times per week- keep a log with dates and times and bring to office visit   Regular exercise and following a healthy diet encouraged   Follow up with me in one month   Ok to have COVID vaccine     The scribes documentation has been prepared under my direction and personally reviewed by me in its entirety. I confirm that the note above accurately reflects all work, treatment, procedures, and medical decision making performed by me. Dr. Bishop Yadira MD      Scribe's attestation: This note was scribed in the presence of Dr. Bishop Yadira PETER By Sonny Walker RN    Thank you for allowing me to participate in the care of this individual.      Bernard Jenkins.  Dinora Ocampo M.D., Samuel Pederson

## 2021-05-28 ENCOUNTER — HOSPITAL ENCOUNTER (OUTPATIENT)
Dept: VASCULAR LAB | Age: 46
Discharge: HOME | End: 2021-05-28
Payer: COMMERCIAL

## 2021-05-28 ENCOUNTER — OFFICE VISIT (OUTPATIENT)
Dept: CARDIOLOGY CLINIC | Age: 46
End: 2021-05-28
Payer: COMMERCIAL

## 2021-05-28 ENCOUNTER — TELEPHONE (OUTPATIENT)
Dept: CARDIOLOGY CLINIC | Age: 46
End: 2021-05-28

## 2021-05-28 VITALS
OXYGEN SATURATION: 97 % | WEIGHT: 150 LBS | HEART RATE: 84 BPM | DIASTOLIC BLOOD PRESSURE: 80 MMHG | HEIGHT: 64 IN | BODY MASS INDEX: 25.61 KG/M2 | SYSTOLIC BLOOD PRESSURE: 112 MMHG

## 2021-05-28 DIAGNOSIS — I10 ESSENTIAL HYPERTENSION: Primary | ICD-10-CM

## 2021-05-28 DIAGNOSIS — M79.601 RIGHT ARM PAIN: ICD-10-CM

## 2021-05-28 DIAGNOSIS — R09.89 ABSENT RADIAL PULSE: ICD-10-CM

## 2021-05-28 DIAGNOSIS — R09.89 ABSENT RADIAL PULSE: Primary | ICD-10-CM

## 2021-05-28 DIAGNOSIS — E78.00 PURE HYPERCHOLESTEROLEMIA: ICD-10-CM

## 2021-05-28 PROCEDURE — 99214 OFFICE O/P EST MOD 30 MIN: CPT | Performed by: INTERNAL MEDICINE

## 2021-05-28 PROCEDURE — 1111F DSCHRG MED/CURRENT MED MERGE: CPT | Performed by: INTERNAL MEDICINE

## 2021-05-28 PROCEDURE — 93931 UPPER EXTREMITY STUDY: CPT

## 2021-05-28 PROCEDURE — G8427 DOCREV CUR MEDS BY ELIG CLIN: HCPCS | Performed by: INTERNAL MEDICINE

## 2021-05-28 PROCEDURE — 1036F TOBACCO NON-USER: CPT | Performed by: INTERNAL MEDICINE

## 2021-05-28 PROCEDURE — G8419 CALC BMI OUT NRM PARAM NOF/U: HCPCS | Performed by: INTERNAL MEDICINE

## 2021-05-28 RX ORDER — ATORVASTATIN CALCIUM 40 MG/1
40 TABLET, FILM COATED ORAL DAILY
Qty: 30 TABLET | Refills: 11 | Status: SHIPPED | OUTPATIENT
Start: 2021-05-28 | End: 2022-08-03

## 2021-05-28 NOTE — LETTER
[address] [place]  Phone: [phone]  Fax: [phone]     Tessa Monroe MD     5/28/2021     BARB Maguire CNP   [address] [facility] [address]     Patient: Martha Majano   MR Number: [id number]   YOB: 1975   Date of Visit: 5/28/2021     Dear Dr. Terri Peña     Today I saw our mutual patient named above. Below are the relevant portions of my assessment and plan of care. If you have questions, please do not hesitate to call me. I look forward to following Reina along with you. [facility]   Cardiac Consultation    Referring Provider:  BARB Maguire CNP     Chief Complaint   Patient presents with    [facility]-Up [facility]     5/14/21    Coronary Artery Disease    Hyperlipidemia        History of Present Illness:    Martha Majano is a 50 y.o. female who is here today for hospital follow up S/P left heart cath. She was initially seen as a new patient on 4/2/2021 by request of BARB WIGGINS CNP for chest pain, SOB, and an abnormal EKG. She has a past medical history of asthma, hypertension, hyperlipidemia, diabetes mellitus. She had a stress test on 5/7/2021 which could not exclude small to moderate area of anterior ischemia so she underwent a left heart cath on 5/14/2021 which showed mild nonobstructive coronary artery disease. An echocardiogram from 5/7/2021 showed an EF of 55% with mild mitral regurgitation. She wore a GEOVANNY in 4/2021 which showed rare PAC's and PVC's. Today she states she has been feeling well overall. She has some pain and throbbing at her radial artery site that has started to decrease now. No loss of strength or sensation. She states her blood sugar is labile and she has seen endocrinology in the past. She is not currently taking a Statin due to concerns she may get pregnant again. She is currently taking an Aspirin 81 mg daily.  She checks her blood pressure at home and it is elevated prior to taking her medications. Patient currently denies any weight gain, edema, palpitations, chest pain, shortness of breath, dizziness, and syncope. Past Medical History:   has a past medical history of Chest pain, DM (diabetes mellitus) ([address] and Hypertension. Surgical History:   has a past surgical history that includes Cardiac catheterization (05/14/2021). Social History:   reports that she has never smoked. She has never used smokeless tobacco. She reports current alcohol use. She reports that she does not use drugs. Family History:  family history includes Breast Cancer in her paternal aunt, paternal grandfather, and paternal uncle; Cancer in her paternal grandfather; Cancer (age of onset: 52) in her father; Dementia in her maternal grandfather; Diabetes in her brother, father, maternal grandmother, and paternal grandmother; Heart Attack in her father; Heart Failure in her paternal grandmother; Hypertension in her father, maternal grandfather, maternal grandmother, and mother; Other in her brother, father, and maternal grandfather. Home Medications:  Prior to Admission medications    Medication Sig Start Date End Date Taking?  Authorizing Provider   aspirin EC 81 MG EC tablet Take 1 tablet by mouth daily 5/14/21  Yes Gene Maier MD   atorvastatin (LIPITOR) 10 MG tablet Take 1 tablet by mouth daily 5/14/21  Yes Gene Maier MD   [employer] D3 125 MCG (5000 UT) CAPS capsule TAKE 1 CAPSULE BY MOUTH EVERY DAY 4/19/21  Yes BARB Longo CNP   Biotin 1000 MCG TABS Take 1 tablet by mouth daily   Yes Historical Provider, MD   labetalol (NORMODYNE) 200 MG tablet TAKE 1 TABLET BY MOUTH TWICE A DAY 3/4/21  Yes BARB Longo CNP   albuterol sulfate (PROAIR RESPICLICK) 527 (90 Base) MCG/ACT aerosol powder inhalation Inhale 2 puffs into the lungs every 6 hours as needed for Wheezing or Shortness of Breath 3/4/21  Yes Helen Waggoner APRN - CNP   blood glucose test strips (ASCENSIA AUTODISC VI;ONE TOUCH ULTRA TEST VI) strip DX: E11.9 Brand approved by insurance. FSBS daily 7/10/19  Yes BARB Reyes CNP   blood glucose monitor kit and supplies by Other route daily E11.9. FSBS daily. Brand covered by insurance 7/8/19  Yes BARB Reyes CNP   acetaminophen (TYLENOL) 325 MG tablet Take 650 mg by mouth every 6 hours as needed for Pain   Yes Historical Provider, MD   Multiple Vitamins-Minerals (THERAPEUTIC MULTIVITAMIN-MINERALS) tablet Take 1 tablet by mouth daily   Yes Historical Provider, MD   Lancets MISC 1 each by Does not apply route 2 times daily DX: E 11.9 FSBS daily. Brand approved by insurance. 7/11/19 3/4/21  BARB Reyes CNP        Allergies:  Eggs or egg-derived products     Review of Systems:   · Constitutional: there has been no unanticipated weight loss. There's been no change in energy level, sleep pattern, or activity level. · Eyes: No visual changes or diplopia. No scleral icterus. · ENT: No Headaches, hearing loss or vertigo. No mouth sores or sore throat. · Cardiovascular: Reviewed in HPI  · Respiratory: No cough or wheezing, no sputum production. No hematemesis. · Gastrointestinal: No abdominal pain, appetite loss, blood in stools. No change in bowel or bladder habits. · Genitourinary: No dysuria, trouble voiding, or hematuria. · Musculoskeletal:  No gait disturbance, weakness or joint complaints. · Integumentary: No rash or pruritis. · Neurological: No headache, diplopia, change in muscle strength, numbness or tingling. No change in gait, balance, coordination, mood, affect, memory, mentation, behavior. · Psychiatric: No anxiety, no depression. · Endocrine: No malaise, fatigue or temperature intolerance. No excessive thirst, fluid intake, or urination. No tremor. · Hematologic/Lymphatic: No abnormal bruising or bleeding, blood clots or swollen lymph nodes.   · Allergic/Immunologic: No nasal congestion or hives. Physical Examination:    Vitals:    05/28/21 0858   BP: 112/80   Pulse: 84   SpO2: 97%        Constitutional and General Appearance: NAD   Respiratory:  · Normal excursion and expansion without use of accessory muscles  · Resp Auscultation: Normal breath sounds without dullness  Cardiovascular:  · The apical impulses not displaced  · Heart tones are crisp and normal  · Cervical veins are not engorged  · The carotid upstroke is normal in amplitude and contour without delay or bruit  · Normal S1S2, No S3, No Murmur  · Peripheral pulses are symmetrical and full, loss of right radial pulse   · There is no clubbing, cyanosis of the extremities. · No edema  · Femoral Arteries: 2+ and equal  · Pedal Pulses: 2+ and equal   Abdomen:  · No masses or tenderness  · Liver/Spleen: No Abnormalities Noted  Neurological/Psychiatric:  · Alert and oriented in all spheres  · Moves all extremities well  · Exhibits normal gait balance and coordination  · No abnormalities of mood, affect, memory, mentation, or behavior are noted    Cath site: no palpable radial pulse. + palpable ulnar pulse. Mild ecchymosis at site. Normal strength and sensation. EKG 5/2019  Sinus  Rhythm   -  Nonspecific T-abnormality. Stress test 5/7/2021  Conclusions    Summary  Normal LVEF >60%  Normal wall motion  Diaphragmatic and breast attenuation artifact  Cannot exclude small to moderate area of anterior ischemia    Overall, this would be considered an abnormal, low to intermediate risk,  study     Echocardiogram 5/7/2021  Summary   LV systolic function is normal with EF estimated at 55%. No regional wall motion abnormalities. Normal left ventricular diastolic filling pressure. Mild mitral regurgitation. Trivial aortic and tricuspid regurgitation.    Inadequate tricuspid regurgitation jet to estimate systolic artery pressure    Left heart cath 5/14/2021  Left Heart Cath:    Findings   LVEDP 10   LVEF 55%   LV wall motion Normal wall motion   Gradient across AV None   Mitral regurg none      Coronary Angiogram:  Artery Findings/Result   LM No angiographic CAD   LAD Mid 20%   LCx No angiographic CAD   RI No angiographic CAD   RCA Dominant, No angiographic CAD         Assessment/Plan  1. Mild nonobstructive CAD  2. Evaluate for other causes for cardiac chest pain. Given history coronary vasospasms may be possibility. 3.  For now we will start aspirin and statin for CV risk reduction.     GEOVANNY 4/2021  Rare PVC's and PAC's       Assessment:   Suspected radial artery occlusion  Right arm pain post cath - improving  Coronary artery disease - mild, stable  Chest pain - typical/atypical   Abnormal EKG  Mild mitral regurgitation. Palpitations  - PAC, PVC, AT. Stable. Om BBlk  Hypertension - controlled  Hyperlipidemia - suboptimal. Recommend statin. R/B/A/E discussed, including possible risk w/ pregnancy. She will d/w gynecologist and decide. Diabetes Mellitus   Family HX of heart diease in father     Plan:  Arterial US of right radial artery - stat  Cont ASA - R/B/A/E discussed   Recommend increasing Lipitor to 40 mg daily. She would like to discuss this with her OBGYN but will send script in. You can try to take Co Q 10 if you have muscle aches   Cardiac medications reviewed including indications and pertinent side effects    Check blood pressure and heart rate at home a few times per week- keep a log with dates and times and bring to office visit   Regular exercise and following a healthy diet encouraged   Follow up with me in one month   Ok to have COVID vaccine     The scribes documentation has been prepared under my direction and personally reviewed by me in its entirety. I confirm that the note above accurately reflects all work, treatment, procedures, and medical decision making performed by me. Dr. Brandi Mas MD      Scribe's attestation:   This note was scribed in the presence of  Steff Chapman M.D. By Gen Greenwood RN    Thank you for allowing me to participate in the care of this individual.      Khalida Reyes.  Adal Soria M.D., Leydi Tarango           Sincerely,      Edison Jones MD

## 2021-05-28 NOTE — PATIENT INSTRUCTIONS
Plan:  Arterial US of right radial artery   Recommend increasing Lipitor to 40 mg daily. She would like to discuss this with her OBGYN but will send script in.  You can try to take Co Q 10 if you have muscle aches   Cardiac medications reviewed including indications and pertinent side effects    Check blood pressure and heart rate at home a few times per week- keep a log with dates and times and bring to office visit   Regular exercise and following a healthy diet encouraged   Follow up with me in one month   Ok to have COVID vaccine

## 2021-06-01 ENCOUNTER — TELEPHONE (OUTPATIENT)
Dept: CARDIOLOGY CLINIC | Age: 46
End: 2021-06-01

## 2021-06-01 DIAGNOSIS — R09.89 ABSENT RADIAL PULSE: Primary | ICD-10-CM

## 2021-06-01 NOTE — TELEPHONE ENCOUNTER
----- Message from Johnna Ibarra MD sent at 6/1/2021  8:29 AM EDT -----  Results discussed w/ pt  Refer to PV surgery - call pt to help make OV  Needs seen this week

## 2021-06-01 NOTE — TELEPHONE ENCOUNTER
----- Message from Santosh Colunga MD sent at 6/1/2021  8:29 AM EDT -----  Results discussed w/ pt  Refer to PV surgery - call pt to help make OV  Needs seen this week

## 2021-06-02 ENCOUNTER — OFFICE VISIT (OUTPATIENT)
Dept: VASCULAR SURGERY | Age: 46
End: 2021-06-02
Payer: COMMERCIAL

## 2021-06-02 VITALS
BODY MASS INDEX: 25.61 KG/M2 | DIASTOLIC BLOOD PRESSURE: 88 MMHG | HEIGHT: 64 IN | WEIGHT: 150 LBS | SYSTOLIC BLOOD PRESSURE: 130 MMHG

## 2021-06-02 DIAGNOSIS — I70.208 RADIAL ARTERY OCCLUSION, RIGHT (HCC): Primary | ICD-10-CM

## 2021-06-02 PROCEDURE — G8427 DOCREV CUR MEDS BY ELIG CLIN: HCPCS | Performed by: SURGERY

## 2021-06-02 PROCEDURE — G8419 CALC BMI OUT NRM PARAM NOF/U: HCPCS | Performed by: SURGERY

## 2021-06-02 PROCEDURE — 99243 OFF/OP CNSLTJ NEW/EST LOW 30: CPT | Performed by: SURGERY

## 2021-06-02 NOTE — LETTER
[facility] ([facility]) Vascular & Endovascular Surgery  [address]  [address] [address]  Phone: [phone]  Fax: [phone]           Helen Mack MD      June 2, 2021     Patient: Pamela Painter   MR Number: [id number]   YOB: 1975   Date of Visit: 6/2/2021       Dear Dr. Turpin Left: Thank you for referring Hetal Lovett to me for evaluation/treatment. Below are the relevant portions of my assessment and plan of care. If you have questions, please do not hesitate to call me. I look forward to following Reina along with you.     Sincerely,        Helen Mack MD    CC providers:  Catrachito Enriquez MD  [facility] [phone]  Via In [facility] [address], MD  [address] [phone]  [address] [place]

## 2021-06-02 NOTE — PROGRESS NOTES
Outpatient Consultation / H&P    Date of Consultation:  6/2/2021    PCP:  BARB Higginbotham CNP     Referring Provider:  Dr. Jennifer Mckeon     Chief Complaint:   Chief Complaint   Patient presents with    Other     patient was sent by Dr. Jennifer Mckeon for blockage in right arm. pamlr         History of Present Illness: We are asked to see this patient in consultation by Dr. Jennifer Mckeon regarding radial artery occlusion. Loren Vela is a 50 y.o. female who underwent cardiac cath via right radial artery 5/14. Patient reports she was told there was significant \"spasm\" of radial artery. Post procedure she had pain and throbbing at wrist and forearm for several days. This has resolved for most part. She does have occasional numbness in thumb and index finger. No weakness. Duplex was performed showing segmental occlusion of radial artery at cath site with retrograde flow at wrist.      Past Medical History:  Past Medical History:   Diagnosis Date    Chest pain 05/2021    + NM stress test    DM (diabetes mellitus) ([facility] [address]     Hypertension        Past Surgical History:  Past Surgical History:   Procedure Laterality Date    CARDIAC CATHETERIZATION  05/14/2021    Non Obs CAD, medical management, <20% stenosis       Home Medications:   Prior to Admission medications    Medication Sig Start Date End Date Taking?  Authorizing Provider   atorvastatin (LIPITOR) 40 MG tablet Take 1 tablet by mouth daily 5/28/21  Yes Cristian Gardner MD   aspirin EC 81 MG EC tablet Take 1 tablet by mouth daily 5/14/21  Yes Byron Kraus MD   [facility] D3 125 MCG (5000 UT) CAPS capsule TAKE 1 CAPSULE BY MOUTH EVERY DAY 4/19/21  Yes BARB Junior CNP   Biotin 1000 MCG TABS Take 1 tablet by mouth daily   Yes Historical Provider, MD   labetalol (NORMODYNE) 200 MG tablet TAKE 1 TABLET BY MOUTH TWICE A DAY 3/4/21  Yes BARB Junior CNP   albuterol sulfate (PROAIR RESPICLICK) 712 (90 Base) MCG/ACT aerosol powder inhalation Inhale 2 puffs into the lungs every 6 hours as needed for Wheezing or Shortness of Breath 3/4/21  Yes BARB Reyes CNP   blood glucose test strips (ASCENSIA AUTODISC VI;ONE TOUCH ULTRA TEST VI) strip DX: E11.9 Brand approved by insurance. FSBS daily 7/10/19  Yes BARB Reyes CNP   blood glucose monitor kit and supplies by Other route daily E11.9. FSBS daily. Brand covered by insurance 7/8/19  Yes BARB Reyes CNP   acetaminophen (TYLENOL) 325 MG tablet Take 650 mg by mouth every 6 hours as needed for Pain   Yes Historical Provider, MD   Multiple Vitamins-Minerals (THERAPEUTIC MULTIVITAMIN-MINERALS) tablet Take 1 tablet by mouth daily   Yes Historical Provider, MD   Lancets MISC 1 each by Does not apply route 2 times daily DX: E 11.9 FSBS daily. Brand approved by insurance. 7/11/19 3/4/21  BARB Reyes CNP        Allergies:  Eggs or egg-derived products      Social History:      Social History     Socioeconomic History    Marital status:      Spouse name: Not on file    Number of children: Not on file    Years of education: Not on file    Highest education level: Not on file   Occupational History    Not on file   Tobacco Use    Smoking status: Never Smoker    Smokeless tobacco: Never Used   Vaping Use    Vaping Use: Never used   Substance and Sexual Activity    Alcohol use: Yes     Comment: 2 times per year    Drug use: No    Sexual activity: Yes     Partners: Male   Other Topics Concern    Not on file   Social History Narrative    Not on file     Social Determinants of Health     Financial Resource Strain: Medium Risk    Difficulty of Paying Living Expenses: Somewhat hard   Food Insecurity: No Food Insecurity    Worried About Running Out of Food in the Last Year: Never true    Sukumar of Food in the Last Year: Never true   Transportation Needs: No Transportation Needs    Lack of Transportation (Medical):  No    Lack of Transportation (Non-Medical): No   Physical Activity:     Days of Exercise per Week:     Minutes of Exercise per Session:    Stress:     Feeling of Stress :    Social Connections:     Frequency of Communication with Friends and Family:     Frequency of Social Gatherings with Friends and Family:     Attends Church Services:     Active Member of Clubs or Organizations:     Attends Club or Organization Meetings:     Marital Status:    Intimate Partner Violence:     Fear of Current or Ex-Partner:     Emotionally Abused:     Physically Abused:     Sexually Abused:        Family History:        Problem Relation Age of Onset    Hypertension Mother     Cancer Father 52        colorectal    Diabetes Father     Hypertension Father     Heart Attack Father     Other Father         GERD-surgery    Diabetes Brother     Other Brother         GERD-surgery    Hypertension Maternal Grandmother     Diabetes Maternal Grandmother     Hypertension Maternal Grandfather     Other Maternal Grandfather         gastric ulcers requiring surgery    Dementia Maternal Grandfather         after head injurt    Heart Failure Paternal Grandmother     Diabetes Paternal Grandmother     Cancer Paternal Grandfather         lung    Breast Cancer Paternal Grandfather     Breast Cancer Paternal Aunt     Breast Cancer Paternal Uncle        Review of Systems:  A 14 point review of systems was completed. Pertinent positives identified in the HPI, all other review of systems negative. Physical Examination:    /88 (Site: Left Upper Arm)   Ht 5' 4\" (1.626 m)   Wt 150 lb (68 kg)   BMI 25.75 kg/m²     Weight: 150 lb (68 kg)       General appearance: NAD  Eyes: PERRLA  Neck: no JVD, no lymphadenopathy. Respiratory: effort is unlabored, no crackles, wheezes or rubs. Cardiovascular: regular, no murmur. No carotid bruits. No edema or varicosities.    Pulses: Strong right ulnar, superficial palmar arch, and all digital doppler signals. GI: abdomen soft, nondistended, no organomegaly. Musculoskeletal: strength and tone normal.  Extremities: warm and pink. Skin: no dermatitis or ulceration. Neuro/psychiatric: grossly intact. MEDICAL DECISION MAKING/TESTING    Duplex:personally reviewed with findings as noted in HPI. Assessment:      Diagnosis Orders   1. Radial artery occlusion, right (HCC)       No evidence of ischemia. Symptoms more likely secondary to associated nerve inflammation rather than ischemia. Recommendations/Plan:    No treatment. Continue low dose ASA.        Dior Salomon MD, MD, FACS

## 2021-06-25 ENCOUNTER — OFFICE VISIT (OUTPATIENT)
Dept: CARDIOLOGY CLINIC | Age: 46
End: 2021-06-25
Payer: COMMERCIAL

## 2021-06-25 VITALS
DIASTOLIC BLOOD PRESSURE: 84 MMHG | WEIGHT: 153 LBS | SYSTOLIC BLOOD PRESSURE: 138 MMHG | HEIGHT: 64 IN | OXYGEN SATURATION: 100 % | BODY MASS INDEX: 26.12 KG/M2 | HEART RATE: 75 BPM

## 2021-06-25 DIAGNOSIS — E78.00 PURE HYPERCHOLESTEROLEMIA: ICD-10-CM

## 2021-06-25 DIAGNOSIS — R94.39 ABNORMAL STRESS TEST: Primary | ICD-10-CM

## 2021-06-25 DIAGNOSIS — I70.208 RADIAL ARTERY OCCLUSION, LEFT (HCC): ICD-10-CM

## 2021-06-25 PROCEDURE — G8427 DOCREV CUR MEDS BY ELIG CLIN: HCPCS | Performed by: INTERNAL MEDICINE

## 2021-06-25 PROCEDURE — 99214 OFFICE O/P EST MOD 30 MIN: CPT | Performed by: INTERNAL MEDICINE

## 2021-06-25 PROCEDURE — G8419 CALC BMI OUT NRM PARAM NOF/U: HCPCS | Performed by: INTERNAL MEDICINE

## 2021-06-25 PROCEDURE — 1036F TOBACCO NON-USER: CPT | Performed by: INTERNAL MEDICINE

## 2021-06-25 RX ORDER — HYDROCHLOROTHIAZIDE 25 MG/1
25 TABLET ORAL EVERY MORNING
Qty: 90 TABLET | Refills: 3 | Status: SHIPPED | OUTPATIENT
Start: 2021-06-25 | End: 2022-08-30 | Stop reason: ALTCHOICE

## 2021-06-25 NOTE — LETTER
Today she states she has been feeling well overall. She states she has bilateral hand swelling which can occur when she wakes up in the mornings. She monitors her blood pressure at home and around 8-9 hours after she takes her first Labetalol dose, her blood pressure runs 140/100. She states she has been under some stress with her family which causes her some chest pain. The pain in her right arm/hand has significantly decreased. Patient currently denies any weight gain, palpitations, shortness of breath, dizziness, and syncope. Past Medical History:   has a past medical history of Chest pain, DM (diabetes mellitus) ([facility] [address] and Hypertension. Surgical History:   has a past surgical history that includes Cardiac catheterization (05/14/2021). Social History:   reports that she has never smoked. She has never used smokeless tobacco. She reports current alcohol use. She reports that she does not use drugs. Family History:  family history includes Breast Cancer in her paternal aunt, paternal grandfather, and paternal uncle; Cancer in her paternal grandfather; Cancer (age of onset: 52) in her father; Dementia in her maternal grandfather; Diabetes in her brother, father, maternal grandmother, and paternal grandmother; Heart Attack in her father; Heart Failure in her paternal grandmother; Hypertension in her father, maternal grandfather, maternal grandmother, and mother; Other in her brother, father, and maternal grandfather. Home Medications:  Prior to Admission medications    Medication Sig Start Date End Date Taking?  Authorizing Provider   atorvastatin (LIPITOR) 40 MG tablet Take 1 tablet by mouth daily 5/28/21  Yes Mark Aguero MD   aspirin EC 81 MG EC tablet Take 1 tablet by mouth daily 5/14/21  Yes Yany Gibbs MD   [employer] D3 125 MCG (5000 UT) CAPS capsule TAKE 1 CAPSULE BY MOUTH EVERY DAY 4/19/21  Yes BARB Belcher - CNP   Biotin 1000 MCG TABS Take 1 tablet by mouth daily   Yes Historical Provider, MD   labetalol (NORMODYNE) 200 MG tablet TAKE 1 TABLET BY MOUTH TWICE A DAY 3/4/21  Yes BARB Amador CNP   albuterol sulfate (PROAIR RESPICLICK) 830 (90 Base) MCG/ACT aerosol powder inhalation Inhale 2 puffs into the lungs every 6 hours as needed for Wheezing or Shortness of Breath 3/4/21  Yes BARB Arce CNP   blood glucose test strips (ASCENSIA AUTODISC VI;ONE TOUCH ULTRA TEST VI) strip DX: E11.9 Brand approved by insurance. FSBS daily 7/10/19  Yes BARB Arce CNP   blood glucose monitor kit and supplies by Other route daily E11.9. FSBS daily. Brand covered by insurance 7/8/19  Yes BARB Arce CNP   acetaminophen (TYLENOL) 325 MG tablet Take 650 mg by mouth every 6 hours as needed for Pain   Yes Historical Provider, MD   Multiple Vitamins-Minerals (THERAPEUTIC MULTIVITAMIN-MINERALS) tablet Take 1 tablet by mouth daily   Yes Historical Provider, MD   Lancets MISC 1 each by Does not apply route 2 times daily DX: E 11.9 FSBS daily. Brand approved by insurance. 7/11/19 3/4/21  BARB Arce CNP        Allergies:  Eggs or egg-derived products     Review of Systems:   · Constitutional: there has been no unanticipated weight loss. There's been no change in energy level, sleep pattern, or activity level. · Eyes: No visual changes or diplopia. No scleral icterus. · ENT: No Headaches, hearing loss or vertigo. No mouth sores or sore throat. · Cardiovascular: Reviewed in HPI  · Respiratory: No cough or wheezing, no sputum production. No hematemesis. · Gastrointestinal: No abdominal pain, appetite loss, blood in stools. No change in bowel or bladder habits. · Genitourinary: No dysuria, trouble voiding, or hematuria. · Musculoskeletal:  No gait disturbance, weakness or joint complaints. · Integumentary: No rash or pruritis. · Neurological: No headache, diplopia, change in muscle strength, numbness or tingling.  No change in gait, balance, coordination, mood, affect, memory, mentation, behavior. · Psychiatric: No anxiety, no depression. · Endocrine: No malaise, fatigue or temperature intolerance. No excessive thirst, fluid intake, or urination. No tremor. · Hematologic/Lymphatic: No abnormal bruising or bleeding, blood clots or swollen lymph nodes. · Allergic/Immunologic: No nasal congestion or hives. Physical Examination:    Vitals:    06/25/21 1006   BP: 138/84   Pulse: 75   SpO2: 100%        Constitutional and General Appearance: NAD   Respiratory:  · Normal excursion and expansion without use of accessory muscles  · Resp Auscultation: Normal breath sounds without dullness  Cardiovascular:  · The apical impulses not displaced  · Heart tones are crisp and normal  · Cervical veins are not engorged  · The carotid upstroke is normal in amplitude and contour without delay or bruit  · Normal S1S2, No S3, No Murmur  · Peripheral pulses are symmetrical and full, loss of right radial pulse   · There is no clubbing, cyanosis of the extremities. · No edema  · Femoral Arteries: 2+ and equal  · Pedal Pulses: 2+ and equal   Abdomen:  · No masses or tenderness  · Liver/Spleen: No Abnormalities Noted  Neurological/Psychiatric:  · Alert and oriented in all spheres  · Moves all extremities well  · Exhibits normal gait balance and coordination  · No abnormalities of mood, affect, memory, mentation, or behavior are noted    Cath site: no palpable radial pulse. + palpable ulnar pulse. Mild ecchymosis at site. Normal strength and sensation. EKG 5/2019  Sinus  Rhythm   -  Nonspecific T-abnormality.      Stress test 5/7/2021  Conclusions    Summary  Normal LVEF >60%  Normal wall motion  Diaphragmatic and breast attenuation artifact  Cannot exclude small to moderate area of anterior ischemia    Overall, this would be considered an abnormal, low to intermediate risk,  study     Echocardiogram 5/7/2021  Summary   LV systolic function is normal with EF estimated at 55%. No regional wall motion abnormalities. Normal left ventricular diastolic filling pressure. Mild mitral regurgitation. Trivial aortic and tricuspid regurgitation. Inadequate tricuspid regurgitation jet to estimate systolic artery pressure    Left heart cath 5/14/2021  Left Heart Cath:    Findings   LVEDP 10   LVEF 55%   LV wall motion Normal wall motion   Gradient across AV None   Mitral regurg none      Coronary Angiogram:  Artery Findings/Result   LM No angiographic CAD   LAD Mid 20%   LCx No angiographic CAD   RI No angiographic CAD   RCA Dominant, No angiographic CAD         Assessment/Plan  1. Mild nonobstructive CAD  2. Evaluate for other causes for cardiac chest pain. Given history coronary vasospasms may be possibility. 3.  For now we will start aspirin and statin for CV risk reduction.     GEOVANNY 4/2021  Rare PVC's and PAC's     Arterial duplex right upper extremity 5/28/2021  Summary    Occluded right radial artery in the mid-forearm with distal patency. Patent right ulnar artery and brachial arteries throughout their courses. No pseudoaneurysm. Assessment:   Radial artery occlusion post cath - unchanged  Right arm/hand pain - decreased   Edema - intermittent   Coronary artery disease - mild, stable   Chest pain - typical/atypical. Stable. Suspect mor stress than angina   Abnormal EKG  Mild mitral regurgitation. Palpitations  - PAC, PVC, AT. Stable. On BBlk  Hypertension - suboptimal. Elevations on home recordings   Hyperlipidemia - suboptimal. Recommend statin. R/B/A/E discussed, including possible risk w/ pregnancy. Diabetes Mellitus   Family HX of heart diease in father   Hand swelling     Again discussed and reviewed potential risks in pregnacy of her meds and recommended she discuss w/ gynecologist prior to starting and/or continuing current cardiac meds.      Plan:  Recommend starting hydrochlorothiazide 25 mg daily for swelling and better blood pressure   Check BMP in one week   Discuss taking Lipitor with OBGYN as well as hydrochlorothiazide   Cardiac medications reviewed including indications and pertinent side effects    Check blood pressure and heart rate at home a few times per week- keep a log with dates and times and bring to office visit   Regular exercise and following a healthy diet encouraged   Follow up with me in 2 months, can stretch out to 6 months if you are feeling well        Scribe's attestation: This note was scribed in the presence of Dr. Priya Fermin M.D. By Bel Peck RN    The scribes documentation has been prepared under my direction and personally reviewed by me in its entirety. I confirm that the note above accurately reflects all work, treatment, procedures, and medical decision making performed by me. Dr. Priya Fermin MD    Thank you for allowing me to participate in the care of this individual.      Gerry Mejia.  Anastasia Edgar M.D., Malcolm Sandifer           Sincerely,      Hieu Guy MD

## 2021-06-25 NOTE — PROGRESS NOTES
[address]   Cardiac Consultation    Referring Provider:  BARB Herzog CNP     Chief Complaint   Patient presents with    1 Month Follow-Up    Coronary Artery Disease    Hyperlipidemia    Hypertension        History of Present Illness:    Abhijit Bassett is a 50 y.o. female who is here today to follow up for right arm pain and swelling, S/P left heart cath with right radial approach. She was initially seen as a new patient on 4/2/2021 by request of BARB WIGGINS CNP for chest pain, SOB, and an abnormal EKG. She has a past medical history of asthma, hypertension, hyperlipidemia, diabetes mellitus. She had a stress test on 5/7/2021 which could not exclude small to moderate area of anterior ischemia so she underwent a left heart cath on 5/14/2021 which showed mild nonobstructive coronary artery disease. An echocardiogram from 5/7/2021 showed an EF of 55% with mild mitral regurgitation. She wore a GEOVANNY in 4/2021 which showed rare PAC's and PVC's. At her last visit, she reported having pain at her right radial cath site. 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 She was evaluated by vascular surgery who felt symptoms were more related to nerve inflammation than ischemia. At her last visit was discussed starting Lipitor but she wanted to discuss this her OBGYN prior to starting. Today she states she has been feeling well overall. She states she has bilateral hand swelling which can occur when she wakes up in the mornings. She monitors her blood pressure at home and around 8-9 hours after she takes her first Labetalol dose, her blood pressure runs 140/100. She states she has been under some stress with her family which causes her some chest pain. The pain in her right arm/hand has significantly decreased. Patient currently denies any weight gain, palpitations, shortness of breath, dizziness, and syncope.         Past Medical History:   has a past medical history of Chest pain, DM (diabetes mellitus) ([facility] [address] and Hypertension. Surgical History:   has a past surgical history that includes Cardiac catheterization (05/14/2021). Social History:   reports that she has never smoked. She has never used smokeless tobacco. She reports current alcohol use. She reports that she does not use drugs. Family History:  family history includes Breast Cancer in her paternal aunt, paternal grandfather, and paternal uncle; Cancer in her paternal grandfather; Cancer (age of onset: 52) in her father; Dementia in her maternal grandfather; Diabetes in her brother, father, maternal grandmother, and paternal grandmother; Heart Attack in her father; Heart Failure in her paternal grandmother; Hypertension in her father, maternal grandfather, maternal grandmother, and mother; Other in her brother, father, and maternal grandfather. Home Medications:  Prior to Admission medications    Medication Sig Start Date End Date Taking? Authorizing Provider   atorvastatin (LIPITOR) 40 MG tablet Take 1 tablet by mouth daily 5/28/21  Yes Harmeet Wagner MD   aspirin EC 81 MG EC tablet Take 1 tablet by mouth daily 5/14/21  Yes Bisi Nguyen MD   [facility] D3 125 MCG (5000 UT) CAPS capsule TAKE 1 CAPSULE BY MOUTH EVERY DAY 4/19/21  Yes BARB Young CNP   Biotin 1000 MCG TABS Take 1 tablet by mouth daily   Yes Historical Provider, MD   labetalol (NORMODYNE) 200 MG tablet TAKE 1 TABLET BY MOUTH TWICE A DAY 3/4/21  Yes BARB Young CNP   albuterol sulfate (PROAIR RESPICLICK) 465 (90 Base) MCG/ACT aerosol powder inhalation Inhale 2 puffs into the lungs every 6 hours as needed for Wheezing or Shortness of Breath 3/4/21  Yes BARB Young CNP   blood glucose test strips (ASCENSIA AUTODISC VI;ONE TOUCH ULTRA TEST VI) strip DX: E11.9 Brand approved by insurance.  FSBS daily 7/10/19  Yes BARB Young CNP   blood glucose monitor kit and supplies by Other route daily E11.9. FSBS daily. Brand covered by insurance 7/8/19  Yes BARB Galeana CNP   acetaminophen (TYLENOL) 325 MG tablet Take 650 mg by mouth every 6 hours as needed for Pain   Yes Historical Provider, MD   Multiple Vitamins-Minerals (THERAPEUTIC MULTIVITAMIN-MINERALS) tablet Take 1 tablet by mouth daily   Yes Historical Provider, MD   Lancets MISC 1 each by Does not apply route 2 times daily DX: E 11.9 FSBS daily. Brand approved by insurance. 7/11/19 3/4/21  Navid JohnsonBARB teixeira CNP        Allergies:  Eggs or egg-derived products     Review of Systems:   · Constitutional: there has been no unanticipated weight loss. There's been no change in energy level, sleep pattern, or activity level. · Eyes: No visual changes or diplopia. No scleral icterus. · ENT: No Headaches, hearing loss or vertigo. No mouth sores or sore throat. · Cardiovascular: Reviewed in HPI  · Respiratory: No cough or wheezing, no sputum production. No hematemesis. · Gastrointestinal: No abdominal pain, appetite loss, blood in stools. No change in bowel or bladder habits. · Genitourinary: No dysuria, trouble voiding, or hematuria. · Musculoskeletal:  No gait disturbance, weakness or joint complaints. · Integumentary: No rash or pruritis. · Neurological: No headache, diplopia, change in muscle strength, numbness or tingling. No change in gait, balance, coordination, mood, affect, memory, mentation, behavior. · Psychiatric: No anxiety, no depression. · Endocrine: No malaise, fatigue or temperature intolerance. No excessive thirst, fluid intake, or urination. No tremor. · Hematologic/Lymphatic: No abnormal bruising or bleeding, blood clots or swollen lymph nodes. · Allergic/Immunologic: No nasal congestion or hives.     Physical Examination:    Vitals:    06/25/21 1006   BP: 138/84   Pulse: 75   SpO2: 100%        Constitutional and General Appearance: NAD   Respiratory:  · Normal excursion and expansion without use of accessory muscles  · Resp Auscultation: Normal breath sounds without dullness  Cardiovascular:  · The apical impulses not displaced  · Heart tones are crisp and normal  · Cervical veins are not engorged  · The carotid upstroke is normal in amplitude and contour without delay or bruit  · Normal S1S2, No S3, No Murmur  · Peripheral pulses are symmetrical and full, loss of right radial pulse   · There is no clubbing, cyanosis of the extremities. · No edema  · Femoral Arteries: 2+ and equal  · Pedal Pulses: 2+ and equal   Abdomen:  · No masses or tenderness  · Liver/Spleen: No Abnormalities Noted  Neurological/Psychiatric:  · Alert and oriented in all spheres  · Moves all extremities well  · Exhibits normal gait balance and coordination  · No abnormalities of mood, affect, memory, mentation, or behavior are noted    Cath site: no palpable radial pulse. + palpable ulnar pulse. Mild ecchymosis at site. Normal strength and sensation. EKG 5/2019  Sinus  Rhythm   -  Nonspecific T-abnormality. Stress test 5/7/2021  Conclusions    Summary  Normal LVEF >60%  Normal wall motion  Diaphragmatic and breast attenuation artifact  Cannot exclude small to moderate area of anterior ischemia    Overall, this would be considered an abnormal, low to intermediate risk,  study     Echocardiogram 5/7/2021  Summary   LV systolic function is normal with EF estimated at 55%. No regional wall motion abnormalities. Normal left ventricular diastolic filling pressure. Mild mitral regurgitation. Trivial aortic and tricuspid regurgitation.    Inadequate tricuspid regurgitation jet to estimate systolic artery pressure    Left heart cath 5/14/2021  Left Heart Cath:    Findings   LVEDP 10   LVEF 55%   LV wall motion Normal wall motion   Gradient across AV None   Mitral regurg none      Coronary Angiogram:  Artery Findings/Result   LM No angiographic CAD   LAD Mid 20%   LCx No angiographic CAD   RI No angiographic CAD   RCA Dominant, No angiographic CAD         Assessment/Plan  1. Mild nonobstructive CAD  2. Evaluate for other causes for cardiac chest pain. Given history coronary vasospasms may be possibility. 3.  For now we will start aspirin and statin for CV risk reduction.     GEOVANNY 4/2021  Rare PVC's and PAC's     Arterial duplex right upper extremity 5/28/2021  Summary    Occluded right radial artery in the mid-forearm with distal patency. Patent right ulnar artery and brachial arteries throughout their courses. No pseudoaneurysm. Assessment:   Radial artery occlusion post cath - unchanged  Right arm/hand pain - decreased   Edema - intermittent   Coronary artery disease - mild, stable   Chest pain - typical/atypical. Stable. Suspect mor stress than angina   Abnormal EKG  Mild mitral regurgitation. Palpitations  - PAC, PVC, AT. Stable. On BBlk  Hypertension - suboptimal. Elevations on home recordings   Hyperlipidemia - suboptimal. Recommend statin. R/B/A/E discussed, including possible risk w/ pregnancy. Diabetes Mellitus   Family HX of heart diease in father   Hand swelling     Again discussed and reviewed potential risks in pregnacy of her meds and recommended she discuss w/ gynecologist prior to starting and/or continuing current cardiac meds. Plan:  Recommend starting hydrochlorothiazide 25 mg daily for swelling and better blood pressure   Check BMP in one week   Discuss taking Lipitor with OBGYN as well as hydrochlorothiazide   Cardiac medications reviewed including indications and pertinent side effects    Check blood pressure and heart rate at home a few times per week- keep a log with dates and times and bring to office visit   Regular exercise and following a healthy diet encouraged   Follow up with me in 2 months, can stretch out to 6 months if you are feeling well        Scribe's attestation:   This note was scribed in the presence of Dr. Paco Mancera M.D. By Shahab Gerber RN    The scribes documentation has been prepared under my direction and personally reviewed by me in its entirety. I confirm that the note above accurately reflects all work, treatment, procedures, and medical decision making performed by me. Dr. Priya Fermin MD    Thank you for allowing me to participate in the care of this individual.      Gerry Mejia.  Anastasia Edgar M.D., ALESSANDRA/ [address]

## 2021-06-25 NOTE — PATIENT INSTRUCTIONS
Plan:  Recommend starting hydrochlorothiazide 25 mg daily for swelling and better blood pressure   Check BMP in one week   Discuss taking Lipitor with OBGYN as well as hydrochlorothiazide   Cardiac medications reviewed including indications and pertinent side effects    Check blood pressure and heart rate at home a few times per week- keep a log with dates and times and bring to office visit   Regular exercise and following a healthy diet encouraged   Follow up with me in 2 months, can stretch out to 6 months if you are feeling well

## 2022-04-06 DIAGNOSIS — I25.10 CORONARY ARTERY DISEASE, UNSPECIFIED VESSEL OR LESION TYPE, UNSPECIFIED WHETHER ANGINA PRESENT, UNSPECIFIED WHETHER NATIVE OR TRANSPLANTED HEART: Primary | ICD-10-CM

## 2022-04-06 RX ORDER — ASPIRIN 81 MG/1
TABLET ORAL
Qty: 90 TABLET | Refills: 3 | Status: SHIPPED | OUTPATIENT
Start: 2022-04-06

## 2022-05-04 DIAGNOSIS — I10 ESSENTIAL HYPERTENSION: ICD-10-CM

## 2022-05-04 NOTE — TELEPHONE ENCOUNTER
Refill Request     Last Seen: Last Seen Department: 3/4/2021  Last Seen by PCP: 3/4/2021    Last Written: 09/07/2021 180 Tablet 1 refill    Next Appointment:   No future appointments. Message to Q.branch to schedule appointment. Return in about 6 months (around 9/4/2021).           Requested Prescriptions     Pending Prescriptions Disp Refills    labetalol (NORMODYNE) 200 MG tablet [Pharmacy Med Name: LABETALOL  MG TABLET] 180 tablet 1     Sig: TAKE 1 TABLET BY MOUTH TWICE A DAY

## 2022-05-05 RX ORDER — LABETALOL 200 MG/1
TABLET, FILM COATED ORAL
Qty: 180 TABLET | Refills: 0 | Status: SHIPPED | OUTPATIENT
Start: 2022-05-05 | End: 2022-08-01

## 2022-06-28 DIAGNOSIS — E78.00 PURE HYPERCHOLESTEROLEMIA: Primary | ICD-10-CM

## 2022-07-30 DIAGNOSIS — I10 ESSENTIAL HYPERTENSION: ICD-10-CM

## 2022-08-01 RX ORDER — LABETALOL 200 MG/1
TABLET, FILM COATED ORAL
Qty: 180 TABLET | Refills: 0 | Status: SHIPPED | OUTPATIENT
Start: 2022-08-01 | End: 2022-11-04

## 2022-08-01 NOTE — TELEPHONE ENCOUNTER
Refill Request     CONFIRM preferrred pharmacy with the patient. If Mail Order Rx - Pend for 90 day refill. Last Seen: Last Seen Department: 3/4/2021  Last Seen by PCP: 3/4/2021    Last Written: 5/5/2022    Next Appointment:   Future Appointments   Date Time Provider Yudith Bailey   8/26/2022  8:00 AM DO ROB Mcneal [address]       Message to  to schedule appointment. Due for DM and A1c and routine OV.        Requested Prescriptions     Pending Prescriptions Disp Refills    labetalol (NORMODYNE) 200 MG tablet [Pharmacy Med Name: LABETALOL  MG TABLET] 180 tablet 0     Sig: TAKE 1 TABLET BY MOUTH TWICE A DAY

## 2022-08-01 NOTE — TELEPHONE ENCOUNTER
Patient is scheduled 8-26-22 with [facility]; LM for patient to call back and let us know if she is establishing with them.

## 2022-08-03 ENCOUNTER — TELEPHONE (OUTPATIENT)
Dept: CARDIOLOGY CLINIC | Age: 47
End: 2022-08-03

## 2022-08-03 RX ORDER — ATORVASTATIN CALCIUM 40 MG/1
TABLET, FILM COATED ORAL
Qty: 30 TABLET | Refills: 5 | Status: SHIPPED | OUTPATIENT
Start: 2022-08-03 | End: 2022-08-30 | Stop reason: SDUPTHER

## 2022-08-03 NOTE — TELEPHONE ENCOUNTER
LOV 06/25/2021  NOV 08/30/2022 @ 8:45 AM      Medication Refill    Medication needing refilled: atorvastatin (LIPITOR)    Dosage of the medication: 40 MG    How are you taking this medication (QD, BID, TID, QID, PRN): 1 TAB BY MOUTH DAILY    30 or 90 day supply called in: 80 DAY    When will you run out of your medication: TODAY 08/03    Which Pharmacy are we sending the medication to?:     [facility]/pharmacy #[id number]- [address], [address] [facility]   Phone:  [phone]  Fax:  [phone]

## 2022-08-24 NOTE — PROGRESS NOTES
Sofy Aguayo [address] [address]  Dept: [phone]  Dept Fax: [phone]  Loc: [phone]    Visit Date: 8/26/2022    Manuela Qureshi is a 50 y.o. female who presents today for: New Patient (Has been in [facility], sees cardiology )      Here to establish care as new patient. Due for physical  Assessment/Plan:     ASSESSMENT/PLAN:     Diagnosis Orders   1. Encounter to establish care with new doctor        2. Annual physical exam  COLONOSCOPY (Screening)    CBC    Comprehensive Metabolic Panel    Lipid Panel    Vitamin D 25 Hydroxy      3. Type 2 diabetes mellitus without complication, without long-term current use of insulin ([facility])  POCT glycosylated hemoglobin (Hb A1C)    POCT microalbumin    Hemoglobin A1C      4. Costochondritis  XR CHEST (2 VW)    pain lower ant ribs      5. Screen for colon cancer  COLONOSCOPY (Screening)      6. Vitamin D deficiency  Vitamin D 25 Hydroxy      7. Screening mammogram for breast cancer  MATILDA DIGITAL SCREEN W OR WO CAD BILATERAL      8. Family history of breast cancer  MATILDA DIGITAL SCREEN W OR WO CAD BILATERAL      9. Hypoglycemia  Hemoglobin A1C          Reina was seen today for new patient. Diagnoses and all orders for this visit:    Encounter to establish care with new doctor    Annual physical exam  -     COLONOSCOPY (Screening); Future  -     CBC; Future  -     Comprehensive Metabolic Panel; Future  -     Lipid Panel; Future  -     Vitamin D 25 Hydroxy; Future    Type 2 diabetes mellitus without complication, without long-term current use of insulin ([address] chronic, discussed diet. -     POCT glycosylated hemoglobin (Hb A1C)  -     Cancel: POCT Urinalysis No Micro (Auto)  -     POCT microalbumin  A1C in offce was too low to read, so we will ch=jeffrey with lab. Episodes of hypoglycemia, Information given in the after visit summary   And discussed  Costochondritis, new complaints.   We will do x-ray  Comments:  pain lower ant ribs  Orders:  -     XR CHEST (2 VW); Future    Screen for colon cancer  -     COLONOSCOPY (Screening); Future    Vitamin D deficiency chronic continue same medications  -     Vitamin D 25 Hydroxy; Future    Screening mammogram for breast cancer  -     MATILDA DIGITAL SCREEN W OR WO CAD BILATERAL; Future    Family history of breast cancer  -     MATILDA DIGITAL SCREEN W OR WO CAD BILATERAL; Future      HPI:     HPI    She is new patient. She has diabetes however does not take medication for it she is complaining of her sugars being high and 200 and sometimes low in the 30s. Last A1C was 6.1 in march 2021  Bps have been under control  She is on a statin for cholesterol. She has a new complaint of pain in her ribs. Pain is located in the bilateral anterior ribs. It is comes and goes it is worse with coughing. She does have mild intermittent asthma. This is been going on for over a year. She is concerned because she has a family history of cancer and therefore we will do a chest x-ray today    BP Readings from Last 3 Encounters:   08/26/22 118/76   06/25/21 138/84   06/02/21 130/88        Lab Results   Component Value Date    LABA1C 6.1 03/04/2021     Lab Results   Component Value Date    EAG 91.1 12/09/2009       Patient's problem list, medications, past medical, surgical, family, and social histories were reviewed and updated in the chart as indicated today.     Patient Active Problem List:     Type 2 diabetes mellitus without complication (HCC)     Essential hypertension     Precordial pain     Pure hypercholesterolemia     Vitamin D deficiency     Cervical lymphadenopathy     Psoriasis     Mild intermittent asthma without complication     Abnormal stress test     Radial artery occlusion, left (HCC)         Current Outpatient Medications:     atorvastatin (LIPITOR) 40 MG tablet, TAKE 1 TABLET BY MOUTH EVERY DAY, Disp: 30 tablet, Rfl: 5    labetalol (NORMODYNE) 200 MG tablet, TAKE 1 TABLET BY MOUTH TWICE A DAY, Disp: 180 tablet, Rfl: 0    aspirin 81 MG EC tablet, TAKE 1 TABLET BY MOUTH EVERY DAY, Disp: 90 tablet, Rfl: 3    CVS D3 125 MCG (5000 UT) CAPS capsule, TAKE 1 CAPSULE BY MOUTH EVERY DAY, Disp: 30 capsule, Rfl: 5    hydroCHLOROthiazide (HYDRODIURIL) 25 MG tablet, Take 1 tablet by mouth every morning (Patient taking differently: Take 25 mg by mouth every morning prn), Disp: 90 tablet, Rfl: 3    albuterol sulfate (PROAIR RESPICLICK) 738 (90 Base) MCG/ACT aerosol powder inhalation, Inhale 2 puffs into the lungs every 6 hours as needed for Wheezing or Shortness of Breath, Disp: 1 Inhaler, Rfl: 5    blood glucose test strips (ASCENSIA AUTODISC VI;ONE TOUCH ULTRA TEST VI) strip, DX: E11.9 Brand approved by insurance. FSBS daily, Disp: 50 strip, Rfl: 5    blood glucose monitor kit and supplies, by Other route daily E11.9. FSBS daily. Brand covered by insurance, Disp: 1 kit, Rfl: 0    acetaminophen (TYLENOL) 325 MG tablet, Take 650 mg by mouth every 6 hours as needed for Pain, Disp: , Rfl:     Multiple Vitamins-Minerals (THERAPEUTIC MULTIVITAMIN-MINERALS) tablet, Take 1 tablet by mouth daily, Disp: , Rfl:     Biotin 1000 MCG TABS, Take 1 tablet by mouth daily (Patient not taking: Reported on 8/26/2022), Disp: , Rfl:     Lancets MISC, 1 each by Does not apply route 2 times daily DX: E 11.9 FSBS daily. Brand approved by insurance., Disp: 100 each, Rfl: 5    Allergies   Allergen Reactions    Eggs Or Egg-Derived Products Nausea Only     Yolk. Takes flu shot yearly without problems.         Past Surgical History:   Procedure Laterality Date    CARDIAC CATHETERIZATION  05/14/2021    Non Obs CAD, medical management, <20% stenosis       Family History:       Problem Relation Age of Onset    Hypertension Mother     Cancer Father 52        colorectal    Diabetes Father     Hypertension Father     Heart Attack Father     Other Father         GERD-surgery    Diabetes Brother     Other Brother GERD-surgery    Hypertension Maternal Grandmother     Diabetes Maternal Grandmother     Hypertension Maternal Grandfather     Other Maternal Grandfather         gastric ulcers requiring surgery    Dementia Maternal Grandfather         after head injurt    Heart Failure Paternal Grandmother     Diabetes Paternal Grandmother     Cancer Paternal Grandfather         lung    Breast Cancer Paternal Grandfather     Breast Cancer Paternal Aunt     Breast Cancer Paternal Uncle        Social History:   Social History     Tobacco Use    Smoking status: Never    Smokeless tobacco: Never   Substance Use Topics    Alcohol use: Yes     Comment: 2 times per year        Subjective:      Review of Systems   Constitutional: Negative for unexpected weight change. HENT: Negative. Eyes: Negative for redness. Respiratory: Negative for shortness of breath. Cardiovascular: Negative for chest pain and leg swelling. Gastrointestinal: Negative for constipation, diarrhea, nausea and vomiting. Skin: Negative for pallor. Allergic/Immunologic: Negative for immunocompromised state. Psychiatric/Behavioral: Negative for confusion. Objective:     /76 (Site: Left Upper Arm, Position: Sitting)   Pulse 96   Temp 97.3 °F (36.3 °C)   Resp 16   Ht 5' 3\" (1.6 m)   Wt 154 lb (69.9 kg)   LMP 08/17/2022   SpO2 98%   BMI 27.28 kg/m²     Well developed well nourished patient in no acute distress. Alert and oriented to person, place, and time. HEENT:Normocephalic, atraumatic. No scleral icterus. Pupils normal. Tms clear no thyromegaly. Heart: Regular Rate and Rhythm. No murmurs. Respirations: lungs clear to auscultation bilaterally. No wheeze. She does have mild pain with palpation in her bilateral anterior lower ribs there is no pain at the costo vertebral junction. Abdomin: Bowel sounds present. Extremities: No peripheral edema. No erythema.           Electronically signed by Dorothy Davis DO on 8/26/2022 at 8:45 AM

## 2022-08-26 ENCOUNTER — OFFICE VISIT (OUTPATIENT)
Dept: FAMILY MEDICINE CLINIC | Age: 47
End: 2022-08-26
Payer: COMMERCIAL

## 2022-08-26 ENCOUNTER — HOSPITAL ENCOUNTER (OUTPATIENT)
Dept: GENERAL RADIOLOGY | Age: 47
Discharge: HOME | End: 2022-08-26
Payer: COMMERCIAL

## 2022-08-26 VITALS
RESPIRATION RATE: 16 BRPM | SYSTOLIC BLOOD PRESSURE: 118 MMHG | HEART RATE: 96 BPM | HEIGHT: 63 IN | TEMPERATURE: 97.3 F | OXYGEN SATURATION: 98 % | BODY MASS INDEX: 27.29 KG/M2 | WEIGHT: 154 LBS | DIASTOLIC BLOOD PRESSURE: 76 MMHG

## 2022-08-26 DIAGNOSIS — Z12.11 SCREEN FOR COLON CANCER: ICD-10-CM

## 2022-08-26 DIAGNOSIS — E55.9 VITAMIN D DEFICIENCY: ICD-10-CM

## 2022-08-26 DIAGNOSIS — Z76.89 ENCOUNTER TO ESTABLISH CARE WITH NEW DOCTOR: Primary | ICD-10-CM

## 2022-08-26 DIAGNOSIS — Z00.00 ANNUAL PHYSICAL EXAM: ICD-10-CM

## 2022-08-26 DIAGNOSIS — E16.2 HYPOGLYCEMIA: ICD-10-CM

## 2022-08-26 DIAGNOSIS — E11.9 TYPE 2 DIABETES MELLITUS WITHOUT COMPLICATION, WITHOUT LONG-TERM CURRENT USE OF INSULIN (HCC): ICD-10-CM

## 2022-08-26 DIAGNOSIS — Z80.3 FAMILY HISTORY OF BREAST CANCER: ICD-10-CM

## 2022-08-26 DIAGNOSIS — M94.0 COSTOCHONDRITIS: ICD-10-CM

## 2022-08-26 DIAGNOSIS — Z12.31 SCREENING MAMMOGRAM FOR BREAST CANCER: ICD-10-CM

## 2022-08-26 LAB
A/G RATIO: 2.2 (ref 1.1–2.2)
ALBUMIN SERPL-MCNC: 4.8 G/DL (ref 3.4–5)
ALP BLD-CCNC: 94 U/L (ref 40–129)
ALT SERPL-CCNC: 19 U/L (ref 10–40)
ANION GAP SERPL CALCULATED.3IONS-SCNC: 12 MMOL/L (ref 3–16)
AST SERPL-CCNC: 15 U/L (ref 15–37)
BILIRUB SERPL-MCNC: 0.7 MG/DL (ref 0–1)
BUN BLDV-MCNC: 18 MG/DL (ref 7–20)
CALCIUM SERPL-MCNC: 9.4 MG/DL (ref 8.3–10.6)
CHLORIDE BLD-SCNC: 100 MMOL/L (ref 99–110)
CHOLESTEROL, TOTAL: 192 MG/DL (ref 0–199)
CO2: 22 MMOL/L (ref 21–32)
CREAT SERPL-MCNC: 0.7 MG/DL (ref 0.6–1.1)
GFR AFRICAN AMERICAN: >60
GFR NON-AFRICAN AMERICAN: >60
GLUCOSE BLD-MCNC: 277 MG/DL (ref 70–99)
HCT VFR BLD CALC: 43.3 % (ref 36–48)
HDLC SERPL-MCNC: 57 MG/DL (ref 40–60)
HEMOGLOBIN: 14.9 G/DL (ref 12–16)
LDL CHOLESTEROL CALCULATED: 111 MG/DL
MCH RBC QN AUTO: 31.8 PG (ref 26–34)
MCHC RBC AUTO-ENTMCNC: 34.5 G/DL (ref 31–36)
MCV RBC AUTO: 92.2 FL (ref 80–100)
PDW BLD-RTO: 12.8 % (ref 12.4–15.4)
PLATELET # BLD: 262 K/UL (ref 135–450)
PMV BLD AUTO: 8.8 FL (ref 5–10.5)
POTASSIUM SERPL-SCNC: 4.6 MMOL/L (ref 3.5–5.1)
RBC # BLD: 4.7 M/UL (ref 4–5.2)
SODIUM BLD-SCNC: 134 MMOL/L (ref 136–145)
TOTAL PROTEIN: 7 G/DL (ref 6.4–8.2)
TRIGL SERPL-MCNC: 118 MG/DL (ref 0–150)
VITAMIN D 25-HYDROXY: 33.7 NG/ML
VLDLC SERPL CALC-MCNC: 24 MG/DL
WBC # BLD: 8.5 K/UL (ref 4–11)

## 2022-08-26 PROCEDURE — G8427 DOCREV CUR MEDS BY ELIG CLIN: HCPCS | Performed by: FAMILY MEDICINE

## 2022-08-26 PROCEDURE — 83036 HEMOGLOBIN GLYCOSYLATED A1C: CPT | Performed by: FAMILY MEDICINE

## 2022-08-26 PROCEDURE — 99396 PREV VISIT EST AGE 40-64: CPT | Performed by: FAMILY MEDICINE

## 2022-08-26 PROCEDURE — 82044 UR ALBUMIN SEMIQUANTITATIVE: CPT | Performed by: FAMILY MEDICINE

## 2022-08-26 PROCEDURE — 99213 OFFICE O/P EST LOW 20 MIN: CPT | Performed by: FAMILY MEDICINE

## 2022-08-26 PROCEDURE — 1036F TOBACCO NON-USER: CPT | Performed by: FAMILY MEDICINE

## 2022-08-26 PROCEDURE — 71046 X-RAY EXAM CHEST 2 VIEWS: CPT

## 2022-08-26 PROCEDURE — G8419 CALC BMI OUT NRM PARAM NOF/U: HCPCS | Performed by: FAMILY MEDICINE

## 2022-08-26 PROCEDURE — 3046F HEMOGLOBIN A1C LEVEL >9.0%: CPT | Performed by: FAMILY MEDICINE

## 2022-08-26 PROCEDURE — 2022F DILAT RTA XM EVC RTNOPTHY: CPT | Performed by: FAMILY MEDICINE

## 2022-08-26 SDOH — ECONOMIC STABILITY: FOOD INSECURITY: WITHIN THE PAST 12 MONTHS, YOU WORRIED THAT YOUR FOOD WOULD RUN OUT BEFORE YOU GOT MONEY TO BUY MORE.: NEVER TRUE

## 2022-08-26 SDOH — ECONOMIC STABILITY: INCOME INSECURITY: IN THE LAST 12 MONTHS, WAS THERE A TIME WHEN YOU WERE NOT ABLE TO PAY THE MORTGAGE OR RENT ON TIME?: NO

## 2022-08-26 SDOH — ECONOMIC STABILITY: FOOD INSECURITY: WITHIN THE PAST 12 MONTHS, THE FOOD YOU BOUGHT JUST DIDN'T LAST AND YOU DIDN'T HAVE MONEY TO GET MORE.: NEVER TRUE

## 2022-08-26 SDOH — ECONOMIC STABILITY: HOUSING INSECURITY
IN THE LAST 12 MONTHS, WAS THERE A TIME WHEN YOU DID NOT HAVE A STEADY PLACE TO SLEEP OR SLEPT IN A SHELTER (INCLUDING NOW)?: NO

## 2022-08-26 ASSESSMENT — ANXIETY QUESTIONNAIRES
3. WORRYING TOO MUCH ABOUT DIFFERENT THINGS: 0
IF YOU CHECKED OFF ANY PROBLEMS ON THIS QUESTIONNAIRE, HOW DIFFICULT HAVE THESE PROBLEMS MADE IT FOR YOU TO DO YOUR WORK, TAKE CARE OF THINGS AT HOME, OR GET ALONG WITH OTHER PEOPLE: NOT DIFFICULT AT ALL
7. FEELING AFRAID AS IF SOMETHING AWFUL MIGHT HAPPEN: 0
5. BEING SO RESTLESS THAT IT IS HARD TO SIT STILL: 0
2. NOT BEING ABLE TO STOP OR CONTROL WORRYING: 0
GAD7 TOTAL SCORE: 0
1. FEELING NERVOUS, ANXIOUS, OR ON EDGE: 0
4. TROUBLE RELAXING: 0
6. BECOMING EASILY ANNOYED OR IRRITABLE: 0

## 2022-08-26 ASSESSMENT — SOCIAL DETERMINANTS OF HEALTH (SDOH): HOW HARD IS IT FOR YOU TO PAY FOR THE VERY BASICS LIKE FOOD, HOUSING, MEDICAL CARE, AND HEATING?: NOT HARD AT ALL

## 2022-08-26 ASSESSMENT — PATIENT HEALTH QUESTIONNAIRE - PHQ9
1. LITTLE INTEREST OR PLEASURE IN DOING THINGS: 0
SUM OF ALL RESPONSES TO PHQ QUESTIONS 1-9: 6
5. POOR APPETITE OR OVEREATING: 0
SUM OF ALL RESPONSES TO PHQ QUESTIONS 1-9: 6
8. MOVING OR SPEAKING SO SLOWLY THAT OTHER PEOPLE COULD HAVE NOTICED. OR THE OPPOSITE, BEING SO FIGETY OR RESTLESS THAT YOU HAVE BEEN MOVING AROUND A LOT MORE THAN USUAL: 0
SUM OF ALL RESPONSES TO PHQ QUESTIONS 1-9: 6
3. TROUBLE FALLING OR STAYING ASLEEP: 1
SUM OF ALL RESPONSES TO PHQ9 QUESTIONS 1 & 2: 1
10. IF YOU CHECKED OFF ANY PROBLEMS, HOW DIFFICULT HAVE THESE PROBLEMS MADE IT FOR YOU TO DO YOUR WORK, TAKE CARE OF THINGS AT HOME, OR GET ALONG WITH OTHER PEOPLE: 1
9. THOUGHTS THAT YOU WOULD BE BETTER OFF DEAD, OR OF HURTING YOURSELF: 0
SUM OF ALL RESPONSES TO PHQ QUESTIONS 1-9: 6
7. TROUBLE CONCENTRATING ON THINGS, SUCH AS READING THE NEWSPAPER OR WATCHING TELEVISION: 1
2. FEELING DOWN, DEPRESSED OR HOPELESS: 1
4. FEELING TIRED OR HAVING LITTLE ENERGY: 3
6. FEELING BAD ABOUT YOURSELF - OR THAT YOU ARE A FAILURE OR HAVE LET YOURSELF OR YOUR FAMILY DOWN: 0

## 2022-08-26 NOTE — PATIENT INSTRUCTIONS
Hypogycemia happens when the blood sugar drops too low usually when it has been several hours since last eating. Signs and Symptoms of Hypoglycemia   Shakiness,Nervousness or anxiety  Sweating, chills and clamminess,  Irritability or impatience,  Rapid/fast heartbeat,  Lightheadedness or dizziness   Weakness or fatigue                                                                       If you notice you are having any of these symptoms, have a snack. You may want to switch to 6 smaller meals a day instead of 3 meals. Decrease high carb foods if your blood sugar is high. High carbohydrate foods are:  Breads muffins rolls and bagels  Past rice corn and grains  Potatoes sweet potatoes  Legumes: peas beans lentils. Milk ( almond milk ok)  Most fruit except berries  Cake cookies pies ice cream candy etc.  Juices, soda, sweetened ice tea  Beer. For your weight, exercise 30 minutes 5 times weekly and improve the types of food you eat:    Protein  Best options: The American Diabetes Association (ADA) recommends lean proteins low in saturated fat, like fish or turkey. Aim for two servings of seafood each week; some fish, like salmon, have the added benefit of containing heart healthy omega-3 fats. For a vegetarian protein source, experiment with the wide variety of beans. Nuts, which are protein and healthy fats powerhouses, are also a choice. Worst options: Processed deli meats and hot dogs have high amounts of fat along with lots of sodium, which can increase the risk of high blood pressure. Heart attack and stroke are two common complications of diabetes, so keeping blood pressure in check is important. Grains  Best options: When choosing grains, make sure theyre whole. Decrease the amount of foods which contain flour.   Whole grains such as wild rice, quinoa, and whole grain breads and cereals contain fiber, which is beneficial for digestive health, but often the grains flour products raise your blood sugar and when your blood sugar returns to normal, it can trigger the desire to eat again. Worst options: Refined white flour doesnt contain the same health benefits as whole grains. Processed foods made with white flour include breakfast cereals, white bread, and pastries, cookies, muffins, pretzels, crackers, so avoid these options. Also try to steer clear of white rice and pasta. Dairy  Best options: With only 6 to 8 grams of carbohydrates in a serving, plain nonfat [place] yogurt is a healthy and versatile dairy option. You can add berries and enjoy it for dessert or breakfast; you can use it in recipes as a replacement for sour cream, which is high in saturated fat. Skim milk. Worst options: Avoid all full-fat dairy products and especially packaged chocolate milk, as it also has added sugar. Avoid yogurts with added sugar. Vegetables  Best options: Non-starchy vegetables such as leafy greens, broccoli, cauliflower, asparagus, and carrots are low in carbohydrates and high in fiber and other nutrients, Kathleen Cuevas says. You can eat non-starchy vegetables in abundance -- half of your plate should be filled with these veggies. If youre craving mashed potatoes, give mashed cauliflower a try. Worst options: Stick to small portions of starchy vegetables such as corn, potatoes, and peas. These items are nutritious, but should be eaten in moderation. The ADA groups them with grains because of their high carb content. Fruit  Best options: Fresh fruit can conquer your craving for sweets while providing antioxidants and fiber. Berries are a great option because recommended portion sizes are typically generous, which may leave you feeling more satisfied  Worst options: Avoid added sugar by eliminating fruits canned in syrup, and be aware that dried fruits have a very high sugar concentration.  Also, fruit juices should be consumed rarely as theyre high in sugar and dont contain the same nutrients as whole fruit. Fats  Best options: Some types of fat actually help protect your heart. Choose the monounsaturated fats found in avocados, almonds, olives, and pecans or the polyunsaturated fats found in walnuts and sunflower oil, which can help to lower bad cholesterol. Worst options: Saturated fats increase bad cholesterol, so limit butter, cheese, gravy, and fried foods. Keep calories from animal sources of saturated fat to less than 10 percent of your total daily intake. Trans fats are even worse than saturated fats, so avoid them completely. Look for the term hydrogenated on labels of processed foods such as packaged snacks, baked goods, and crackers.

## 2022-08-27 LAB
ESTIMATED AVERAGE GLUCOSE: 203 MG/DL
HBA1C MFR BLD: 8.7 %

## 2022-08-29 ENCOUNTER — HOSPITAL ENCOUNTER (OUTPATIENT)
Dept: MAMMOGRAPHY | Age: 47
Discharge: HOME | End: 2022-08-29
Payer: COMMERCIAL

## 2022-08-29 DIAGNOSIS — Z80.3 FAMILY HISTORY OF BREAST CANCER: ICD-10-CM

## 2022-08-29 DIAGNOSIS — Z12.31 SCREENING MAMMOGRAM FOR BREAST CANCER: ICD-10-CM

## 2022-08-29 PROCEDURE — 77063 BREAST TOMOSYNTHESIS BI: CPT

## 2022-08-29 NOTE — PROGRESS NOTES
reports that she has never smoked. She has never used smokeless tobacco. She reports current alcohol use. She reports that she does not use drugs. Family History:  family history includes Breast Cancer in her paternal aunt, paternal grandfather, and paternal uncle; Cancer in her paternal grandfather; Cancer (age of onset: 52) in her father; Dementia in her maternal grandfather; Diabetes in her brother, father, maternal grandmother, and paternal grandmother; Heart Attack in her father; Heart Failure in her paternal grandmother; Hypertension in her father, maternal grandfather, maternal grandmother, and mother; Other in her brother, father, and maternal grandfather. Home Medications:  Prior to Admission medications    Medication Sig Start Date End Date Taking? Authorizing Provider   atorvastatin (LIPITOR) 40 MG tablet TAKE 1 TABLET BY MOUTH EVERY DAY 8/3/22  Yes Adrien Landers MD   labetalol (NORMODYNE) 200 MG tablet TAKE 1 TABLET BY MOUTH TWICE A DAY 8/1/22  Yes BARB Pena CNP   aspirin 81 MG EC tablet TAKE 1 TABLET BY MOUTH EVERY DAY 4/6/22  Yes Adrien Landers MD   [facility] D3 125 MCG (5000 UT) CAPS capsule TAKE 1 CAPSULE BY MOUTH EVERY DAY 10/21/21  Yes BARB Pena CNP   hydroCHLOROthiazide (HYDRODIURIL) 25 MG tablet Take 1 tablet by mouth every morning  Patient taking differently: Take 25 mg by mouth every morning prn 6/25/21  Yes Adrien Landers MD   Biotin 1000 MCG TABS Take 1 tablet by mouth daily   Yes Historical Provider, MD   albuterol sulfate (PROAIR RESPICLICK) 943 (90 Base) MCG/ACT aerosol powder inhalation Inhale 2 puffs into the lungs every 6 hours as needed for Wheezing or Shortness of Breath 3/4/21  Yes BARB Pena CNP   blood glucose test strips (ASCENSIA AUTODISC VI;ONE TOUCH ULTRA TEST VI) strip DX: E11.9 Brand approved by insurance.  FSBS daily 7/10/19  Yes BARB Pena CNP   blood glucose monitor kit and supplies by Other route daily E11.9. FSBS daily. Brand covered by insurance 7/8/19  Yes BARB Enciso CNP   acetaminophen (TYLENOL) 325 MG tablet Take 650 mg by mouth every 6 hours as needed for Pain   Yes Historical Provider, MD   Multiple Vitamins-Minerals (THERAPEUTIC MULTIVITAMIN-MINERALS) tablet Take 1 tablet by mouth daily   Yes Historical Provider, MD   metFORMIN (GLUCOPHAGE) 500 MG tablet Take 1 tablet by mouth daily (with breakfast) 8/30/22   [facility],    Lancets MISC 1 each by Does not apply route 2 times daily DX: E 11.9 FSBS daily. Brand approved by insurance. 7/11/19 3/4/21  BARB Enciso CNP        Allergies:  Eggs or egg-derived products     Review of Systems:   Constitutional: there has been no unanticipated weight loss. There's been no change in energy level, sleep pattern, or activity level. Eyes: No visual changes or diplopia. No scleral icterus. ENT: No Headaches, hearing loss or vertigo. No mouth sores or sore throat. Cardiovascular: Reviewed in HPI  Respiratory: No cough or wheezing, no sputum production. No hematemesis. Gastrointestinal: No abdominal pain, appetite loss, blood in stools. No change in bowel or bladder habits. Genitourinary: No dysuria, trouble voiding, or hematuria. Musculoskeletal:  No gait disturbance, weakness or joint complaints. Integumentary: No rash or pruritis. Neurological: No headache, diplopia, change in muscle strength, numbness or tingling. No change in gait, balance, coordination, mood, affect, memory, mentation, behavior. Psychiatric: No anxiety, no depression. Endocrine: No malaise, fatigue or temperature intolerance. No excessive thirst, fluid intake, or urination. No tremor. Hematologic/Lymphatic: No abnormal bruising or bleeding, blood clots or swollen lymph nodes. Allergic/Immunologic: No nasal congestion or hives.     Physical Examination:    Vitals:    08/30/22 0830   BP: 112/70   Pulse: 82   SpO2: 98% Constitutional and General Appearance: NAD   Respiratory:  Normal excursion and expansion without use of accessory muscles  Resp Auscultation: Normal breath sounds without dullness  Cardiovascular: The apical impulses not displaced  Heart tones are crisp and normal  Cervical veins are not engorged  The carotid upstroke is normal in amplitude and contour without delay or bruit  Normal S1S2, No S3, No Murmur  Peripheral pulses are symmetrical and full, loss of right radial pulse   There is no clubbing, cyanosis of the extremities. No edema  Femoral Arteries: 2+ and equal  Pedal Pulses: 2+ and equal   Abdomen:  No masses or tenderness  Liver/Spleen: No Abnormalities Noted  Neurological/Psychiatric:  Alert and oriented in all spheres  Moves all extremities well  Exhibits normal gait balance and coordination  No abnormalities of mood, affect, memory, mentation, or behavior are noted    Cath site: no palpable radial pulse. + palpable ulnar pulse. Mild ecchymosis at site. Normal strength and sensation. EKG 5/2019  Sinus  Rhythm   -  Nonspecific T-abnormality. Stress test 5/7/2021  Conclusions    Summary  Normal LVEF >60%  Normal wall motion  Diaphragmatic and breast attenuation artifact  Cannot exclude small to moderate area of anterior ischemia    Overall, this would be considered an abnormal, low to intermediate risk,  study     Echocardiogram 5/7/2021  Summary   LV systolic function is normal with EF estimated at 55%. No regional wall motion abnormalities. Normal left ventricular diastolic filling pressure. Mild mitral regurgitation. Trivial aortic and tricuspid regurgitation.    Inadequate tricuspid regurgitation jet to estimate systolic artery pressure    Left heart cath 5/14/2021  Left Heart Cath:    Findings   LVEDP 10   LVEF 55%   LV wall motion Normal wall motion   Gradient across AV None   Mitral regurg none      Coronary Angiogram:  Artery Findings/Result   LM No angiographic CAD   LAD Mid 20% LCx No angiographic CAD   RI No angiographic CAD   RCA Dominant, No angiographic CAD         Assessment/Plan  1. Mild nonobstructive CAD  2. Evaluate for other causes for cardiac chest pain. Given history coronary vasospasms may be possibility. 3.  For now we will start aspirin and statin for CV risk reduction. GEOVANNY 4/2021  Rare PVC's and PAC's     Arterial duplex right upper extremity 5/28/2021  Summary    Occluded right radial artery in the mid-forearm with distal patency. Patent right ulnar artery and brachial arteries throughout their courses. No pseudoaneurysm. Assessment:   SOB  - unchanged. Not cardiac   Radial artery occlusion post cath - unchanged  Right arm/hand pain - decreased   Edema - intermittent   Coronary artery disease - mild, stable   Chest pain - atypical. Stable. Not cardiac   Abnormal EKG  Palpitations  - PAC, PVC, AT. Stable. On BBlk  Hypertension - suboptimal.   Hyperlipidemia - suboptimal. Recommend increasing statin. R/B/A/E discussed, including possible risk w/ pregnancy. Diabetes Mellitus   Family HX of heart diease in father     Again discussed and reviewed potential risks in pregnacy of her meds. She understands, wants to continue, agrees with changes, and accepts risks. Plan:  Stop hydrochlorothiazide   Start nifedipine 60 mg nightly   Increase Lipitor to 80 mg daily- call with side effects   Risk of pregnancy discussed   Cardiac medications reviewed including indications and pertinent side effects. Medication list updated at this visit. Check blood pressure and heart rate at home a few times per week- keep a log with dates and times and bring to office visit   Regular exercise and following a healthy diet encouraged   Follow up with me in 1 year     Anjelibpeter's attestation:   This note was scribed in the presence of Dr. Latasha Fung M.D. By Chloe Stone RN    The scribes documentation has been prepared under my direction and personally reviewed by me in its entirety. I confirm that the note above accurately reflects all work, treatment, procedures, and medical decision making performed by me. Dr. Gayatri Allen MD        Thank you for allowing me to participate in the care of this individual.      Farideh Najera.  Amita Natarajan M.D., [facility]

## 2022-08-30 ENCOUNTER — OFFICE VISIT (OUTPATIENT)
Dept: CARDIOLOGY CLINIC | Age: 47
End: 2022-08-30
Payer: COMMERCIAL

## 2022-08-30 VITALS
SYSTOLIC BLOOD PRESSURE: 112 MMHG | OXYGEN SATURATION: 98 % | WEIGHT: 155 LBS | HEART RATE: 82 BPM | BODY MASS INDEX: 27.46 KG/M2 | HEIGHT: 63 IN | DIASTOLIC BLOOD PRESSURE: 70 MMHG

## 2022-08-30 DIAGNOSIS — E78.00 PURE HYPERCHOLESTEROLEMIA: ICD-10-CM

## 2022-08-30 DIAGNOSIS — I10 ESSENTIAL HYPERTENSION: Primary | ICD-10-CM

## 2022-08-30 DIAGNOSIS — R07.2 PRECORDIAL PAIN: ICD-10-CM

## 2022-08-30 PROCEDURE — 99214 OFFICE O/P EST MOD 30 MIN: CPT | Performed by: INTERNAL MEDICINE

## 2022-08-30 PROCEDURE — 93000 ELECTROCARDIOGRAM COMPLETE: CPT | Performed by: INTERNAL MEDICINE

## 2022-08-30 PROCEDURE — G8419 CALC BMI OUT NRM PARAM NOF/U: HCPCS | Performed by: INTERNAL MEDICINE

## 2022-08-30 PROCEDURE — 1036F TOBACCO NON-USER: CPT | Performed by: INTERNAL MEDICINE

## 2022-08-30 PROCEDURE — G8427 DOCREV CUR MEDS BY ELIG CLIN: HCPCS | Performed by: INTERNAL MEDICINE

## 2022-08-30 RX ORDER — ATORVASTATIN CALCIUM 80 MG/1
TABLET, FILM COATED ORAL
Qty: 90 TABLET | Refills: 3 | Status: SHIPPED | OUTPATIENT
Start: 2022-08-30

## 2022-08-30 RX ORDER — NIFEDIPINE 60 MG/1
60 TABLET, EXTENDED RELEASE ORAL NIGHTLY
Qty: 90 TABLET | Refills: 1 | Status: SHIPPED | OUTPATIENT
Start: 2022-08-30

## 2022-08-30 NOTE — PATIENT INSTRUCTIONS
Plan:  Stop hydrochlorothiazide   Start nifedipine 60 mg nightly   Increase Lipitor to 80 mg daily- call with side effects   Risk of pregnancy discussed   Cardiac medications reviewed including indications and pertinent side effects. Medication list updated at this visit.    Check blood pressure and heart rate at home a few times per week- keep a log with dates and times and bring to office visit   Regular exercise and following a healthy diet encouraged   Follow up with me in 1 year

## 2022-11-04 DIAGNOSIS — I10 ESSENTIAL HYPERTENSION: ICD-10-CM

## 2022-11-04 RX ORDER — LABETALOL 200 MG/1
TABLET, FILM COATED ORAL
Qty: 180 TABLET | Refills: 0 | Status: SHIPPED | OUTPATIENT
Start: 2022-11-04

## 2022-11-04 NOTE — TELEPHONE ENCOUNTER
Refill Request     CONFIRM preferrred pharmacy with the patient. If Mail Order Rx - Pend for 90 day refill. Last Seen: Last Seen Department: 3/4/2021  Last Seen by PCP: 3/4/2021    Last Written: 8/1/2022 180 tablet 0 refills    If no future appointment scheduled, route STAFF MESSAGE with patient name to the [facility] for scheduling. Next Appointment:   No future appointments. Message sent to [address] [place] [address] to schedule appt with patient?   NO      Requested Prescriptions     Pending Prescriptions Disp Refills    labetalol (NORMODYNE) 200 MG tablet [Pharmacy Med Name: LABETALOL  MG TABLET] 180 tablet 0     Sig: TAKE 1 TABLET BY MOUTH TWICE A DAY

## 2022-11-16 ENCOUNTER — TELEPHONE (OUTPATIENT)
Dept: FAMILY MEDICINE CLINIC | Age: 47
End: 2022-11-16

## 2022-11-16 DIAGNOSIS — E11.9 TYPE 2 DIABETES MELLITUS WITHOUT COMPLICATION, WITHOUT LONG-TERM CURRENT USE OF INSULIN (HCC): Primary | ICD-10-CM

## 2022-11-16 RX ORDER — BLOOD-GLUCOSE METER
1 KIT MISCELLANEOUS DAILY
Qty: 1 KIT | Refills: 0 | Status: CANCELLED | OUTPATIENT
Start: 2022-11-16

## 2022-11-16 NOTE — TELEPHONE ENCOUNTER
Patient was advised of results stated that she tried the metformin and that she started the metformin but made her sweat shake ( Plunge to quickly) and felt her blood would get low after taking medication stated her heart starts racing so she has been taking a half a pill right after dinner. Stated that she still gets the effects but not as bad .

## 2022-11-16 NOTE — TELEPHONE ENCOUNTER
Future Appointments   Date Time Provider Yudith Bailey   11/22/2022  8:30 AM DO Rosa Perez 43     Patient is also having pain under rib cage under both sides comes and goes.  Does she need referral?

## 2022-11-16 NOTE — TELEPHONE ENCOUNTER
She needs recheck on diabetes, and we can check her ribs at that time. She may have to see another provider because I can not examine ribs on a virtual visit.

## 2022-11-16 NOTE — TELEPHONE ENCOUNTER
Blood count and Vit D are in the normal range. Sodium low, improving from 1 yr ago. Blood sugar high at 277 (see note from the A1C result). cholesterol is improving from 1 yr ago. Written by Dorina Siddiqui DO on 2022  8:38 AM EDT  Seen by patient Mayme Apgar on 2022 10:44 AM    Her A1C is high at 8.7, goal is 7.0. I recommend she start metformin 500mg.    Written by Dorina Siddiqui DO on 2022  8:36 AM EDT  Seen by patient Shola Cordovagar on 2022 10:45 AM    Give her contact info for endocrinologists, see referral.

## 2022-11-16 NOTE — TELEPHONE ENCOUNTER
Patient asking for a call about he lab results from 8-26-22. Patient said she hasn't received a call about her lab results  . Patient also wants a referral for a endo.

## 2022-11-16 NOTE — TELEPHONE ENCOUNTER
If she has been on the metformin for 3 months, she is due for a recheck on the diabetes, while she is waiting to see an endocrinologist because it typically takes time to get in to see an endocrinologist.

## 2022-11-16 NOTE — TELEPHONE ENCOUNTER
----- Message from Jimmy Aurora sent at 11/16/2022  8:20 AM EST -----  Subject: Results Request    QUESTIONS  Results: A1C; Ordered by:   Date Performed: 2022-09-27  ---------------------------------------------------------------------------  --------------  Lana Quinteros INFO    [phone]; OK to leave message on voicemail  ---------------------------------------------------------------------------  --------------

## 2022-11-16 NOTE — TELEPHONE ENCOUNTER
Blood count and Vit D are in the normal range. Sodium low, improving from 1 yr ago. Blood sugar high at 277 (see note from the A1C result). cholesterol is improving from 1 yr ago. Written by Blanco Ramirez DO on 8/30/2022  8:38 AM EDT  Seen by patient Melyssa Everett on 8/30/2022 10:44 AM    Her A1C is high at 8.7, goal is 7.0. I recommend she start metformin 500mg. Written by Blanco Ramirez DO on 8/30/2022  8:36 AM EDT  Seen by patient Melyssa Everett on 8/30/2022 10:45 AM    Give her contact info for endocrinologists, see referral.  Has she started metformin?

## 2022-11-21 ENCOUNTER — NURSE TRIAGE (OUTPATIENT)
Dept: OTHER | Facility: CLINIC | Age: 47
End: 2022-11-21

## 2022-11-21 NOTE — TELEPHONE ENCOUNTER
Location of patient: [address] call from [facility] with Emida. Subjective: Caller states \"I called last week b/c of test results for my A1c and no one called me back, states medication was called in but noted that she needs  a new test kit and test strips, not called in. Also, noted rib pain x 2 years, states she was told to call and make an appt. \"     Current Symptoms: vomiting last night (b/c she ate part of a brownie); asymptomatic at current time    No triage; pt needs an appt, wants a referral to endocrinology, and needs a new test kit with test strips. Recommended disposition:  Call PCP when office is open    Care advice provided, patient verbalizes understanding; denies any other questions or concerns; instructed to call back for any new or worsening symptoms. Patient/Caller agrees with recommended disposition; writer provided warm transfer to [place] [facility] for appointment scheduling; writer requested that pt be scheduled as soon as possible as she is unable to test her BS    Attention Provider: Thank you for allowing me to participate in the care of your patient. The patient was connected to triage in response to information provided to the [facility]. Please do not respond through this encounter as the response is not directed to a shared pool. Reason for Disposition   Requesting regular office appointment    Protocols used:  Information Only Call - No Triage-ADULT-

## 2022-11-23 ENCOUNTER — OFFICE VISIT (OUTPATIENT)
Dept: FAMILY MEDICINE CLINIC | Age: 47
End: 2022-11-23
Payer: COMMERCIAL

## 2022-11-23 VITALS
HEART RATE: 77 BPM | DIASTOLIC BLOOD PRESSURE: 78 MMHG | BODY MASS INDEX: 26.93 KG/M2 | WEIGHT: 152 LBS | SYSTOLIC BLOOD PRESSURE: 142 MMHG | RESPIRATION RATE: 16 BRPM | OXYGEN SATURATION: 98 % | TEMPERATURE: 97.9 F

## 2022-11-23 DIAGNOSIS — E08.00 DIABETES MELLITUS DUE TO UNDERLYING CONDITION WITH HYPEROSMOLARITY WITHOUT COMA, WITHOUT LONG-TERM CURRENT USE OF INSULIN (HCC): Primary | ICD-10-CM

## 2022-11-23 DIAGNOSIS — I25.10 CORONARY ARTERY DISEASE INVOLVING NATIVE CORONARY ARTERY OF NATIVE HEART WITHOUT ANGINA PECTORIS: ICD-10-CM

## 2022-11-23 DIAGNOSIS — K21.00 GASTROESOPHAGEAL REFLUX DISEASE WITH ESOPHAGITIS WITHOUT HEMORRHAGE: ICD-10-CM

## 2022-11-23 DIAGNOSIS — G62.9 NEUROPATHY: ICD-10-CM

## 2022-11-23 DIAGNOSIS — Z12.11 SCREEN FOR COLON CANCER: ICD-10-CM

## 2022-11-23 DIAGNOSIS — S55.101A INJURY OF RIGHT RADIAL ARTERY, INITIAL ENCOUNTER: ICD-10-CM

## 2022-11-23 DIAGNOSIS — M94.0 COSTOCHONDRITIS: ICD-10-CM

## 2022-11-23 PROCEDURE — 99215 OFFICE O/P EST HI 40 MIN: CPT | Performed by: NURSE PRACTITIONER

## 2022-11-23 PROCEDURE — 3078F DIAST BP <80 MM HG: CPT | Performed by: NURSE PRACTITIONER

## 2022-11-23 PROCEDURE — G8427 DOCREV CUR MEDS BY ELIG CLIN: HCPCS | Performed by: NURSE PRACTITIONER

## 2022-11-23 PROCEDURE — G8484 FLU IMMUNIZE NO ADMIN: HCPCS | Performed by: NURSE PRACTITIONER

## 2022-11-23 PROCEDURE — G8419 CALC BMI OUT NRM PARAM NOF/U: HCPCS | Performed by: NURSE PRACTITIONER

## 2022-11-23 PROCEDURE — 3074F SYST BP LT 130 MM HG: CPT | Performed by: NURSE PRACTITIONER

## 2022-11-23 PROCEDURE — 1036F TOBACCO NON-USER: CPT | Performed by: NURSE PRACTITIONER

## 2022-11-23 RX ORDER — PREDNISONE 10 MG/1
TABLET ORAL
Qty: 30 TABLET | Refills: 0 | Status: SHIPPED | OUTPATIENT
Start: 2022-11-23

## 2022-11-23 RX ORDER — BLOOD-GLUCOSE METER
1 KIT MISCELLANEOUS DAILY
Qty: 1 KIT | Refills: 0 | Status: SHIPPED | OUTPATIENT
Start: 2022-11-23

## 2022-11-23 RX ORDER — OMEPRAZOLE 40 MG/1
40 CAPSULE, DELAYED RELEASE ORAL
Qty: 30 CAPSULE | Refills: 5 | Status: SHIPPED | OUTPATIENT
Start: 2022-11-23

## 2022-11-23 NOTE — PATIENT INSTRUCTIONS
See HPI for details  Diabetes-reordered freestyle kit and strips, continue metformin, referral to endocrinology  GERD-Prilosec  History of colon cancer, father-set up referral for colonoscopy  Costochondritis? -Prednisone taper, discussed how prednisone may alter blood sugar readings (goal high).   Patient states she is done this before and knows how to monitor blood sugars

## 2022-11-23 NOTE — PROGRESS NOTES
Subjective:      Chief Complaint   Patient presents with    Diabetes     Would prefer a free style glucose meter and strips        Patient ID: Silvio Adair is a 50 y.o. female patient of Dr. Sylvester Stewart who presents for multiple issues. First she complains of bilateral lower rib pain which she states she has had for \"a couple of years\". States she has been diagnosed with something with a very long name (costochondritis). She has never had treatment but has been told she may have this for years and wants to know if there is some treatment. Costochondritis-note from Dr. Sylvester Stewart August of this year states patient has bilateral rib pain diagnosed as costochondritis. Patient tells me the pain is always there it is just worse when she does any activity like walking or moving around a lot. Taking a deep breath does not change the pain. Afebrile. Diabetes-A1c in August of this year was 8.7 with nonfasting blood sugar 277. She is treated with metformin twice daily. She is usually very diligent about checking her blood sugars however now her meter is broken so a new meter is ordered today. No signs of endorgan damage. Referral sent to podiatry since patient is now stating she has some intermittent numbness and tingling in her feet. She has not had an appointment with her eye doctor next week. Cancer-patient states she has an extensive history of cancer in her family, all type of the cancer. Breast, colon, lung, and pancreas. Her father is currently battling colon cancer. Patient has not had a colonoscopy she is age 50, has no symptoms but is at high risk. States she was given a referral to a gastroenterologist to have a scope done but is been putting it off. We discussed how effectively colon cancer can be treated if caught early. States she will set up the colonoscopy as soon as possible.   Was concerned about her diabetic status and being n.p.o. prior to the test.  We discussed how she should check her blood sugars and what she can eat prior to having this done. She request an upper and lower GI to possibly look for any signs of stomach cancer? She has no symptoms other than excessive GERD. I told her she should discuss this with the gastroenterologist.    Coronary Artery Disease-she had chest pain back in April 21 had a stress test that showed nonobstructive coronary artery disease. Echo was normal EF was 55% with mild MR. They apparently put on line through her right radial artery (states the patient) and the artery is collapsed. Now she has tingling and numbness in that hand and weakness she is unable to hold a cup or open things with that hand. She was referred to a vascular surgeon for this. He explained the surgery and its pros and cons. She chooses at this time to wait. I told her to get a squeeze ball and use that with that hand to try to get all the strength that she was capable of doing. If it did not improve we would have to send her back to the vascular surgeon. GERD-states she has terrible problems with a feeling of fullness in her chest and at night it gets worse especially when she lies down she feels like it is all up in her throat. She has been told she has reflux but is not on anything. We will try her on a PPI but again an upper GI would be helpful.     HPI see above    Family History   Problem Relation Age of Onset    Hypertension Mother     Cancer Father 52        colorectal    Diabetes Father     Hypertension Father     Heart Attack Father     Other Father         GERD-surgery    Diabetes Brother     Other Brother         GERD-surgery    Hypertension Maternal Grandmother     Diabetes Maternal Grandmother     Hypertension Maternal Grandfather     Other Maternal Grandfather         gastric ulcers requiring surgery    Dementia Maternal Grandfather         after head injurt    Heart Failure Paternal Grandmother     Diabetes Paternal Grandmother     Cancer Paternal Grandfather lung    Breast Cancer Paternal Grandfather     Breast Cancer Paternal Aunt     Breast Cancer Paternal Uncle        Social History     Socioeconomic History    Marital status:      Spouse name: Not on file    Number of children: Not on file    Years of education: Not on file    Highest education level: Not on file   Occupational History    Not on file   Tobacco Use    Smoking status: Never    Smokeless tobacco: Never   Vaping Use    Vaping Use: Never used   Substance and Sexual Activity    Alcohol use: Yes     Comment: 2 times per year    Drug use: No    Sexual activity: Yes     Partners: Male   Other Topics Concern    Not on file   Social History Narrative    Not on file     Social Determinants of Health     Financial Resource Strain: Low Risk     Difficulty of Paying Living Expenses: Not hard at all   Food Insecurity: No Food Insecurity    Worried About Running Out of Food in the Last Year: Never true    Ran Out of Food in the Last Year: Never true   Transportation Needs: Not on file   Physical Activity: Not on file   Stress: Not on file   Social Connections: Not on file   Intimate Partner Violence: Not on file   Housing Stability: Unknown    Unable to Pay for Housing in the Last Year: No    Number of [place] in the Last Year: Not on file    Unstable Housing in the Last Year: No       Current Outpatient Medications on File Prior to Visit   Medication Sig Dispense Refill    labetalol (NORMODYNE) 200 MG tablet TAKE 1 TABLET BY MOUTH TWICE A  tablet 0    metFORMIN (GLUCOPHAGE) 500 MG tablet Take 1 tablet by mouth daily (with breakfast) 30 tablet 5    atorvastatin (LIPITOR) 80 MG tablet TAKE 1 TABLET BY MOUTH EVERY DAY 90 tablet 3    aspirin 81 MG EC tablet TAKE 1 TABLET BY MOUTH EVERY DAY 90 tablet 3    [facility]S D3 125 MCG (5000 UT) CAPS capsule TAKE 1 CAPSULE BY MOUTH EVERY DAY 30 capsule 5    Biotin 1000 MCG TABS Take 1 tablet by mouth daily      albuterol sulfate (PROAIR RESPICLICK) 006 (90 Base) MCG/ACT aerosol powder inhalation Inhale 2 puffs into the lungs every 6 hours as needed for Wheezing or Shortness of Breath 1 Inhaler 5    acetaminophen (TYLENOL) 325 MG tablet Take 650 mg by mouth every 6 hours as needed for Pain      Multiple Vitamins-Minerals (THERAPEUTIC MULTIVITAMIN-MINERALS) tablet Take 1 tablet by mouth daily      Lancets MISC 1 each by Does not apply route 2 times daily DX: E 11.9 FSBS daily. Brand approved by insurance. 100 each 5    blood glucose test strips (ASCENSIA AUTODISC VI;ONE TOUCH ULTRA TEST VI) strip DX: E11.9 Brand approved by insurance. FSBS daily 50 strip 5    blood glucose monitor kit and supplies by Other route daily E11.9. FSBS daily. Brand covered by insurance 1 kit 0     No current facility-administered medications on file prior to visit. Review of Systems   Respiratory:  Positive for cough and shortness of breath. Negative for chest tightness and wheezing. Albuterol as needed   Cardiovascular:  Negative for chest pain, palpitations and leg swelling. History of coronary artery disease. Left heart catheter showed nonobstructive coronary artery disease. Hypertension controlled with labetalol    Anemia total 192 HDL 57  triglycerides 118. Managed with atorvastatin 80 mg by cardiology   Gastrointestinal:         GERD-PPI   Endocrine: Negative for polydipsia, polyphagia and polyuria. Diabetes last A1c was 8.7. Metformin     Objective:     Physical Exam  Vitals reviewed. Constitutional:       Appearance: She is obese. She is ill-appearing. HENT:      Head: Normocephalic and atraumatic. Cardiovascular:      Rate and Rhythm: Regular rhythm. Pulses: Normal pulses. Heart sounds: Normal heart sounds. No murmur heard. No friction rub. No gallop. Pulmonary:      Effort: Pulmonary effort is normal.      Breath sounds: Normal breath sounds. No wheezing, rhonchi or rales. Chest:      Chest wall: No tenderness.    Abdominal: General: Abdomen is flat. Bowel sounds are normal. There is no distension. Palpations: There is no mass. Tenderness: There is no abdominal tenderness. Hernia: No hernia is present. Musculoskeletal:         General: Tenderness (Bilateral chest and lower back x-ray in August showed no fracture no deformity) present. No deformity or signs of injury. Right lower leg: No edema. Left lower leg: No edema. Skin:     General: Skin is warm and dry. Capillary Refill: Capillary refill takes 2 to 3 seconds. Neurological:      Mental Status: She is alert and oriented to person, place, and time. Psychiatric:         Mood and Affect: Mood normal.         Behavior: Behavior normal.         Thought Content: Thought content normal.         Judgment: Judgment normal.      Comments: Depressed because nobody can figure out the origin of her pain       Assessment:     1. Diabetes mellitus due to underlying condition with hyperosmolarity without coma, without long-term current use of insulin ([facility])  A1c 9.1 referrals placed  - [facility] Endocrinology, Cholesterol, and Diabetes  - [facility] Jose Fung DPM, Podiatry, [facility]    2. Gastroesophageal reflux disease with esophagitis without hemorrhage  Continue PPI, referral to GI    3. Costochondritis  Prednisone taper, monitor blood sugars closely    4. Screen for colon cancer  Referral to GI    5. Injury of right radial artery, initial encounter  During this herself    6. Neuropathy  To podiatry, may need gabapentin    7. Coronary artery disease involving native coronary artery of native heart without angina pectoris  Continue to follow with her cardiologist    Plan:   Above  Please refer back to HPI for extensive details. Patient was very grateful for time spent listening to her. She is depressed and upset because her pain is unmanageable. She did not like the pain clinic.   Will see if steroids help the pain and may get us a definitive answer what is going on.   Follow-up with either myself or Dr. Vidya Elkins in 1 month

## 2022-11-27 ASSESSMENT — ENCOUNTER SYMPTOMS
CHEST TIGHTNESS: 0
WHEEZING: 0
SHORTNESS OF BREATH: 1
COUGH: 1

## 2022-12-01 ENCOUNTER — TELEPHONE (OUTPATIENT)
Dept: FAMILY MEDICINE CLINIC | Age: 47
End: 2022-12-01

## 2022-12-01 NOTE — TELEPHONE ENCOUNTER
Patient called to report that she recently got all of her supplies to begin testing her blood sugar. She reports that last night her BS was 528, this morning it was 263, and three hours after having toast it was 360. She is unsure what to do. She reports she takes metformin once a day. Please advise. I did add her to Dr. Hay Medina schedule tomorrow, but please reach out before that to advise as necessary. Thanks!     8/26/2022 last ov    Future Appointments   Date Time Provider Yudith Lynn   12/2/2022  8:15 AM MD ROB Cantu

## 2022-12-02 ENCOUNTER — OFFICE VISIT (OUTPATIENT)
Dept: FAMILY MEDICINE CLINIC | Age: 47
End: 2022-12-02
Payer: COMMERCIAL

## 2022-12-02 VITALS
BODY MASS INDEX: 26.68 KG/M2 | DIASTOLIC BLOOD PRESSURE: 82 MMHG | HEART RATE: 82 BPM | WEIGHT: 150.6 LBS | OXYGEN SATURATION: 99 % | SYSTOLIC BLOOD PRESSURE: 122 MMHG | RESPIRATION RATE: 16 BRPM | TEMPERATURE: 97.1 F

## 2022-12-02 DIAGNOSIS — Z23 FLU VACCINE NEED: ICD-10-CM

## 2022-12-02 DIAGNOSIS — E08.00 DIABETES MELLITUS DUE TO UNDERLYING CONDITION WITH HYPEROSMOLARITY WITHOUT COMA, WITHOUT LONG-TERM CURRENT USE OF INSULIN (HCC): Primary | ICD-10-CM

## 2022-12-02 DIAGNOSIS — G62.9 NEUROPATHY: ICD-10-CM

## 2022-12-02 LAB
CREATININE URINE POCT: NORMAL
HBA1C MFR BLD: 9.1 %
MICROALBUMIN/CREAT 24H UR: NORMAL MG/G{CREAT}
MICROALBUMIN/CREAT UR-RTO: NORMAL

## 2022-12-02 PROCEDURE — 1036F TOBACCO NON-USER: CPT | Performed by: STUDENT IN AN ORGANIZED HEALTH CARE EDUCATION/TRAINING PROGRAM

## 2022-12-02 PROCEDURE — 3074F SYST BP LT 130 MM HG: CPT | Performed by: STUDENT IN AN ORGANIZED HEALTH CARE EDUCATION/TRAINING PROGRAM

## 2022-12-02 PROCEDURE — 90674 CCIIV4 VAC NO PRSV 0.5 ML IM: CPT | Performed by: STUDENT IN AN ORGANIZED HEALTH CARE EDUCATION/TRAINING PROGRAM

## 2022-12-02 PROCEDURE — G8482 FLU IMMUNIZE ORDER/ADMIN: HCPCS | Performed by: STUDENT IN AN ORGANIZED HEALTH CARE EDUCATION/TRAINING PROGRAM

## 2022-12-02 PROCEDURE — 3078F DIAST BP <80 MM HG: CPT | Performed by: STUDENT IN AN ORGANIZED HEALTH CARE EDUCATION/TRAINING PROGRAM

## 2022-12-02 PROCEDURE — 83036 HEMOGLOBIN GLYCOSYLATED A1C: CPT | Performed by: STUDENT IN AN ORGANIZED HEALTH CARE EDUCATION/TRAINING PROGRAM

## 2022-12-02 PROCEDURE — G8427 DOCREV CUR MEDS BY ELIG CLIN: HCPCS | Performed by: STUDENT IN AN ORGANIZED HEALTH CARE EDUCATION/TRAINING PROGRAM

## 2022-12-02 PROCEDURE — 90471 IMMUNIZATION ADMIN: CPT | Performed by: STUDENT IN AN ORGANIZED HEALTH CARE EDUCATION/TRAINING PROGRAM

## 2022-12-02 PROCEDURE — 82044 UR ALBUMIN SEMIQUANTITATIVE: CPT | Performed by: STUDENT IN AN ORGANIZED HEALTH CARE EDUCATION/TRAINING PROGRAM

## 2022-12-02 PROCEDURE — 99214 OFFICE O/P EST MOD 30 MIN: CPT | Performed by: STUDENT IN AN ORGANIZED HEALTH CARE EDUCATION/TRAINING PROGRAM

## 2022-12-02 PROCEDURE — G8419 CALC BMI OUT NRM PARAM NOF/U: HCPCS | Performed by: STUDENT IN AN ORGANIZED HEALTH CARE EDUCATION/TRAINING PROGRAM

## 2022-12-02 RX ORDER — NIFEDIPINE 60 MG/1
TABLET, EXTENDED RELEASE ORAL
COMMUNITY
Start: 2022-11-26

## 2022-12-02 RX ORDER — LANCETS
1 EACH MISCELLANEOUS DAILY
Qty: 100 EACH | Refills: 3 | Status: SHIPPED | OUTPATIENT
Start: 2022-12-02

## 2022-12-02 NOTE — PATIENT INSTRUCTIONS
Start Metformin 500mg twice daily   A blood sugar target is the range you try to reach as much as possible. These are typical targets:  Before a meal: 80 to 130 mg/dL. Two hours after the start of a meal: Less than 180 mg/dL. DKA Signs and Symptoms  Fast, deep breathing. Dry skin and mouth. Flushed face. Fruity-smelling breath. Headache. Muscle stiffness or aches. Being very tired. Nausea and vomiting.

## 2022-12-02 NOTE — PROGRESS NOTES
c  [address], [address] [facility]  Tel: [phone]      2022     Renetta Hopkins (:  1975) is a 50 y.o. female, here for evaluation of the following medical concerns:  Chief Complaint   Patient presents with    Blood Sugar Problem     Having high blood sugar, this am 243       HPI  Patient is a 64-year-old female with past medical history of diabetes, hypertension, and psoriasis who presents with hyperglycemia. Patient reports that her blood sugars were extremely elevated, on 2022 blood sugar was 528 the next day was 263 and 3 hours after that it was 360 after eating some toast.  Today patient reports her blood sugars are still elevated in the 200s and 300s. Currently she is taking metformin 500 mg once daily. Recently diagnosed with diabetes in 2022 with a hemoglobin A1c of 8.7%. Started taking metformin in September. Patient endorses constant thirst, feeling shaky and fatigued, blurry vision, and polyuria. Endorses numbness and tingling in her feet. Endorses intermittent confusion, mood changes while on prednisone, nervousness anxiousness, sleepiness and tremors. Denies any hypoglycemic symptoms of dizziness, headaches, hunger, pallor, she answers, speech difficulty or sweats. Denies any chest pain, foot ulcerations, polyphagia, vision changes or weakness. Recent diet included [employer], and chicken, 6 hours later blood sugars was in the 300s. Patient recently started on steroids taper / prednisone on 2022, to be taken for 3 weeks, for costochondritis pain. \Denies any previous history of diabetes or family history of diabetes. Had elevated blood sugars in her pregnancy. Denies any recent diet changes. Denies any low blood sugar readings. Patient is interested in joining a gym through her insurance. Review of Systems   Constitutional:  Positive for fatigue. Cardiovascular:  Negative for chest pain. Endocrine: Positive for polydipsia and polyuria. Negative for polyphagia. Skin:  Negative for pallor. Neurological:  Positive for tremors. Negative for dizziness, seizures, speech difficulty, weakness and headaches. Psychiatric/Behavioral:  Positive for confusion. The patient is nervous/anxious. Prior to Visit Medications    Medication Sig Taking? Authorizing Provider   NIFEdipine (PROCARDIA XL) 60 MG extended release tablet  Yes Historical Provider, MD   metFORMIN (GLUCOPHAGE) 500 MG tablet Take 1 tablet by mouth 2 times daily (with meals) Yes Tatiana Escobedo MD   ONE TOUCH ULTRASOFT LANCETS MISC 1 each by Does not apply route daily Yes Tatiana Escobedo MD   omeprazole (PRILOSEC) 40 MG delayed release capsule Take 1 capsule by mouth every morning (before breakfast) Yes BARB Bernard CNP   glucose monitoring (FREESTYLE FREEDOM) kit 1 kit by Does not apply route daily Yes BARB Bernard CNP   blood glucose test strips (ASCENSIA AUTODISC VI;ONE TOUCH ULTRA TEST VI) strip 1 each by In Vitro route daily As needed.  Yes BARB Bernard CNP   predniSONE (DELTASONE) 10 MG tablet 4 daily x 3 days then 3 daily x 3 days then 2 daily x 3 days then 1 daily x 3 days Yes BARB Bernard CNP   labetalol (NORMODYNE) 200 MG tablet TAKE 1 TABLET BY MOUTH TWICE A DAY Yes Elsa Hutchins DO   atorvastatin (LIPITOR) 80 MG tablet TAKE 1 TABLET BY MOUTH EVERY DAY Yes Sowmya Molina MD   aspirin 81 MG EC tablet TAKE 1 TABLET BY MOUTH EVERY DAY Yes Sowmya Molina MD   [facility]S D3 125 MCG (5000 UT) CAPS capsule TAKE 1 CAPSULE BY MOUTH EVERY DAY Yes BARB Zamora CNP   Biotin 1000 MCG TABS Take 1 tablet by mouth daily Yes Historical Provider, MD   albuterol sulfate (PROAIR RESPICLICK) 659 (90 Base) MCG/ACT aerosol powder inhalation Inhale 2 puffs into the lungs every 6 hours as needed for Wheezing or Shortness of Breath Yes BARB Zamora CNP blood glucose test strips (ASCENSIA AUTODISC VI;ONE TOUCH ULTRA TEST VI) strip DX: E11.9 Brand approved by insurance. FSBS daily Yes BARB Bautista CNP   blood glucose monitor kit and supplies by Other route daily E11.9. FSBS daily. Brand covered by insurance Yes BARB Bautista CNP   acetaminophen (TYLENOL) 325 MG tablet Take 650 mg by mouth every 6 hours as needed for Pain Yes Historical Provider, MD   Multiple Vitamins-Minerals (THERAPEUTIC MULTIVITAMIN-MINERALS) tablet Take 1 tablet by mouth daily Yes Historical Provider, MD   Lancets MISC 1 each by Does not apply route 2 times daily DX: E 11.9 FSBS daily. Brand approved by insurance. BARB Bautista CNP        Social History     Tobacco Use    Smoking status: Never    Smokeless tobacco: Never   Substance Use Topics    Alcohol use: Yes     Comment: 2 times per year      Physical exam  /82 (Site: Left Upper Arm, Position: Sitting, Cuff Size: Medium Adult)   Pulse 82   Temp 97.1 °F (36.2 °C) (Temporal)   Resp 16   Wt 150 lb 9.6 oz (68.3 kg)   SpO2 99%   BMI 26.68 kg/m²     Physical Exam  Vitals reviewed. Constitutional:       General: She is not in acute distress. Appearance: Normal appearance. She is not ill-appearing. HENT:      Head: Normocephalic and atraumatic. Cardiovascular:      Rate and Rhythm: Normal rate and regular rhythm. Pulses: Normal pulses. Heart sounds: Normal heart sounds. No murmur heard. No friction rub. No gallop. Pulmonary:      Effort: Pulmonary effort is normal. No respiratory distress. Breath sounds: Normal breath sounds. No wheezing, rhonchi or rales. Chest:      Chest wall: No tenderness. Abdominal:      General: Abdomen is flat. Bowel sounds are normal. There is no distension. Palpations: There is no mass. Tenderness: There is no abdominal tenderness. Hernia: No hernia is present.    Musculoskeletal:         General: No swelling or tenderness. Skin:     General: Skin is warm and dry. Neurological:      General: No focal deficit present. Mental Status: She is alert and oriented to person, place, and time. Psychiatric:         Mood and Affect: Mood normal.         Behavior: Behavior normal.         Thought Content: Thought content normal.         Judgment: Judgment normal.         Hemoglobin A1C   Date Value Ref Range Status   12/02/2022 9.1 % Final     .    ASSESSMENT/PLAN:  1. Diabetes mellitus due to underlying condition with hyperosmolarity without coma, without long-term current use of insulin (HCC)  Uncontrolled hyperglycemia. Current medications metformin 500 mg daily. Last A1c:  9.1% (12/2/2022), previously 8.7% (8/26/2022). POC microalbumin ratio was less than 30 mg/G.  ????Patient is on ?aspirin? ?, and  Atorvastatin 80 mg.?? Currently not on ACEi or ARB  ? ? Lipid panel checked less than a year ago 8/26/2022, ASCVD is 2.4    Discussed low-carb diet options  Will increase to metformin 500 mg twice daily. Routine diabetic retinopathy screening: Referral to ophthalmology  Reprinted referral to endocrine  Will need diabetic foot exam next visit  Follow up in 3 months     - POCT glycosylated hemoglobin (Hb A1C)  - metFORMIN (GLUCOPHAGE) 500 MG tablet; Take 1 tablet by mouth 2 times daily (with meals)  Dispense: 60 tablet; Refill: 3  - ONE TOUCH ULTRASOFT LANCETS MISC; 1 each by Does not apply route daily  Dispense: 100 each; Refill: 3  - POCT microalbumin    2. Neuropathy  Endorses neuropathy in lower extremities. Was given referral to to podiatry    3. Flu vaccine need  - Influenza, FLUCELVAX, (age 10 mo+), IM, PF, 0.5 mL    Return in about 3 months (around 3/2/2023) for Diabetes check . An electronic signature was used to authenticate this note.     --Reyna Lord MD

## 2022-12-12 DIAGNOSIS — E55.9 VITAMIN D DEFICIENCY: ICD-10-CM

## 2022-12-13 ENCOUNTER — CARE COORDINATION (OUTPATIENT)
Dept: CARE COORDINATION | Age: 47
End: 2022-12-13

## 2022-12-13 RX ORDER — CHOLECALCIFEROL (VITAMIN D3) 125 MCG
CAPSULE ORAL
Qty: 30 CAPSULE | Refills: 5 | Status: SHIPPED | OUTPATIENT
Start: 2022-12-13

## 2022-12-15 RX ORDER — OMEPRAZOLE 40 MG/1
CAPSULE, DELAYED RELEASE ORAL
Qty: 30 CAPSULE | Refills: 5 | Status: SHIPPED | OUTPATIENT
Start: 2022-12-15

## 2022-12-16 ENCOUNTER — CARE COORDINATION (OUTPATIENT)
Dept: CARE COORDINATION | Age: 47
End: 2022-12-16

## 2022-12-16 DIAGNOSIS — E11.9 TYPE 2 DIABETES MELLITUS WITHOUT COMPLICATION, WITHOUT LONG-TERM CURRENT USE OF INSULIN (HCC): Primary | ICD-10-CM

## 2022-12-16 RX ORDER — METFORMIN HYDROCHLORIDE 500 MG/1
1000 TABLET, EXTENDED RELEASE ORAL 2 TIMES DAILY
Qty: 120 TABLET | Refills: 1 | Status: SHIPPED | OUTPATIENT
Start: 2022-12-16 | End: 2023-01-10

## 2022-12-16 NOTE — CARE COORDINATION
Ambulatory Care Coordination Note  12/16/2022    ACC: Franko Vega RN      Average BGs by Time of Day                  Day Before Insulin post prandial Insulin Before Insulin  post prandial    Breakfast Dosage  Dosage Dinner Dosage     256          270  236        227          224          183          202      321    210                                                                     AVG BG  225 #DIV/0! 236 #DIV/0! #DIV/0! #DIV/0! 46     FYI:  Introduced role of ACM/CC; patient agrees to f/u  Began CC assessment  Provided information regarding gyms that offer discount  Patient reports BS continue to be elevated and she is requesting to increase her Metformin  Discussed DM education and RD; patient reports she has had DM education and has met with several nutritionists  Patient also reports she does carb counting  Offered patient enrollment in the Remote Patient Monitoring (RPM) program for in-home monitoring: NA. Ambulatory Care Coordination Assessment    Care Coordination Protocol  Referral from Primary Care Provider: Yes  Week 1 - Initial Assessment     Do you have all of your prescriptions and are they filled?: Yes                          How wells does the patient now understand their health and well-being (symptoms, signs or risk factors) and what they need to do to manage their health?: Reasonable to good understanding and already engages in managing health or is willing to undertake better management   How well do you think your patient can engage in healthcare discussions? (Barriers include language, deafness, aphasia, alcohol or drug problems, learning difficulties, concentration): Clear and open communication, no identified barriers   Suggested Interventions and [employer]   Registered Dietician: Completed   Zone Management Tools: In Process                  Prior to Admission medications    Medication Sig Start Date End Date Taking?  Authorizing Provider   metFORMIN (GLUCOPHAGE) 500 MG tablet Take 1 tablet by mouth 2 times daily (with meals) 12/2/22  Yes Destiney Velez MD   omeprazole (PRILOSEC) 40 MG delayed release capsule TAKE 1 CAPSULE BY MOUTH EVERY DAY IN THE MORNING BEFORE BREAKFAST 12/15/22   Lily Qureshi DO   CVS D3 125 MCG (5000 UT) CAPS capsule TAKE 1 CAPSULE BY MOUTH EVERY DAY 12/13/22   Lily Qureshi DO   NIFEdipine (PROCARDIA XL) 60 MG extended release tablet  11/26/22   Historical Provider, MD   ONE TOUCH ULTRASOFT LANCETS MISC 1 each by Does not apply route daily 12/2/22   Destiney Velez MD   glucose monitoring (FREESTYLE FREEDOM) kit 1 kit by Does not apply route daily 11/23/22   BARB Rodriguez CNP   blood glucose test strips (ASCENSIA AUTODISC VI;ONE TOUCH ULTRA TEST VI) strip 1 each by In Vitro route daily As needed. 11/23/22   BARB Rodriguez CNP   predniSONE (DELTASONE) 10 MG tablet 4 daily x 3 days then 3 daily x 3 days then 2 daily x 3 days then 1 daily x 3 days 11/23/22   BARB Rodriguez CNP   labetalol (NORMODYNE) 200 MG tablet TAKE 1 TABLET BY MOUTH TWICE A DAY 11/4/22   Lily Qureshi DO   atorvastatin (LIPITOR) 80 MG tablet TAKE 1 TABLET BY MOUTH EVERY DAY 8/30/22   Skyler Moreira MD   aspirin 81 MG EC tablet TAKE 1 TABLET BY MOUTH EVERY DAY 4/6/22   Skyler Moreira MD   Biotin 1000 MCG TABS Take 1 tablet by mouth daily    Historical Provider, MD   albuterol sulfate (PROAIR RESPICLICK) 942 (90 Base) MCG/ACT aerosol powder inhalation Inhale 2 puffs into the lungs every 6 hours as needed for Wheezing or Shortness of Breath 3/4/21   BARB Yun CNP   Lancets MISC 1 each by Does not apply route 2 times daily DX: E 11.9 FSBS daily. Brand approved by insurance. 7/11/19 3/4/21  BARB Yun CNP   blood glucose test strips (ASCENSIA AUTODISC VI;ONE TOUCH ULTRA TEST VI) strip DX: E11.9 Brand approved by insurance.  FSBS daily 7/10/19   BARB Yun CNP   blood glucose monitor kit and supplies by Other route daily E11.9. FSBS daily.  Brand covered by insurance 7/8/19   BARB Shen - CNP   acetaminophen (TYLENOL) 325 MG tablet Take 650 mg by mouth every 6 hours as needed for Pain    Historical Provider, MD   Multiple Vitamins-Minerals (THERAPEUTIC MULTIVITAMIN-MINERALS) tablet Take 1 tablet by mouth daily    Historical Provider, MD       Future Appointments   Date Time Provider Yudith Bailey   2/8/2023 10:20 AM MD Mary Ellen Alarcon [facility]   3/8/2023  8:40 AM MD Mary Ellen Alarcon [facility]

## 2022-12-21 DIAGNOSIS — E55.9 VITAMIN D DEFICIENCY: ICD-10-CM

## 2022-12-24 DIAGNOSIS — E08.00 DIABETES MELLITUS DUE TO UNDERLYING CONDITION WITH HYPEROSMOLARITY WITHOUT COMA, WITHOUT LONG-TERM CURRENT USE OF INSULIN (HCC): ICD-10-CM

## 2023-01-05 ENCOUNTER — CARE COORDINATION (OUTPATIENT)
Dept: CARE COORDINATION | Age: 48
End: 2023-01-05

## 2023-01-05 DIAGNOSIS — E11.9 TYPE 2 DIABETES MELLITUS WITHOUT COMPLICATION, WITHOUT LONG-TERM CURRENT USE OF INSULIN (HCC): Primary | ICD-10-CM

## 2023-01-05 DIAGNOSIS — E55.9 VITAMIN D DEFICIENCY: ICD-10-CM

## 2023-01-05 RX ORDER — BLOOD-GLUCOSE SENSOR
1 EACH MISCELLANEOUS DAILY
Qty: 1 EACH | Refills: 12 | Status: SHIPPED | OUTPATIENT
Start: 2023-01-05 | End: 2023-02-08

## 2023-01-05 RX ORDER — LANCETS 33 GAUGE
1 EACH MISCELLANEOUS 3 TIMES DAILY PRN
Qty: 100 EACH | Refills: 12 | Status: SHIPPED | OUTPATIENT
Start: 2023-01-05

## 2023-01-05 RX ORDER — LANCING DEVICE/LANCETS
1 KIT MISCELLANEOUS 3 TIMES DAILY PRN
Qty: 1 EACH | Refills: 12 | Status: SHIPPED | OUTPATIENT
Start: 2023-01-05

## 2023-01-05 NOTE — CARE COORDINATION
Ambulatory Care Coordination Note  1/5/2023    ACC: Giovana Pinzon RN    Summary:  Patient reports BS fluctuating mostly from 200-300's had a few 180's a couple of weeks ago  Patient further reports the Metformin XR has been causing GI upset so she has been taking it differently  She is currently taking it with food: One tablet with breakfast, one tablet at lunch, one tablet with dinner and one at bed time  Patient is aware medication works better if she takes two at breakfast and two at dinner and plans on switching to this routine once she gets used to the medication. PLAN:  Will Place referral for CC RD  Keep appointment with Endocrinologist  Begin CGM monitoring  Will send the following hand outs via My Chart:  Diabetes Non-Insulin Medication  Diabetes: Metformin General Information  Diabetes: Type Two    FYI:  Patient requesting lancets One touch Delica Plus  Discussed benefits of CGMs  Reviewed difference between [facility] 2 and 3  Reviewed DM diet  Patient reports she has had DM education when she was dx with gestational DM   Patient reads labels and counts carbs        Offered patient enrollment in the Remote Patient Monitoring (RPM) program for in-home monitoring: NA.    Diabetes Assessment      Meal Planning: Carb counting   How often do you test your blood sugar?: Daily, Bedtime   Do you have barriers with adherence to non-pharmacologic self-management interventions? (Nutrition/Exercise/Self-Monitoring): No       Do you have hyperglycemia symptoms?: Yes   Do you have hypoglycemia symptoms?: No   Blood Sugar Monitoring Regimen: Morning Fasting, Before Meals   Blood Sugar Trends: Fluctuating (Comment: 2-300's)               Care Coordination Interventions    Referral from Primary Care Provider: Yes  Suggested Interventions and Community Resources  Diabetes Education: In Process  Registered Dietician: In Process  Zone Management Tools:  In Process          Goals Addressed    None         Prior to Admission medications    Medication Sig Start Date End Date Taking? Authorizing Provider   metFORMIN (GLUCOPHAGE-XR) 500 MG extended release tablet Take 2 tablets by mouth in the morning and at bedtime 12/16/22   Ivanna Thibodeaux MD   omeprazole (PRILOSEC) 40 MG delayed release capsule TAKE 1 CAPSULE BY MOUTH EVERY DAY IN THE MORNING BEFORE BREAKFAST 12/15/22   Yancy Fire, DO   [facility] D3 125 MCG (5000 UT) CAPS capsule TAKE 1 CAPSULE BY MOUTH EVERY DAY 12/13/22   Yancy [facility], DO   NIFEdipine (PROCARDIA XL) 60 MG extended release tablet  11/26/22   Historical Provider, MD   metFORMIN (GLUCOPHAGE) 500 MG tablet Take 1 tablet by mouth 2 times daily (with meals) 12/2/22   Ivanna Thibodeaux MD   ONE TOUCH ULTRASOFT LANCETS MISC 1 each by Does not apply route daily 12/2/22   Ivanna Thibodeaux MD   glucose monitoring (FREESTYLE FREEDOM) kit 1 kit by Does not apply route daily 11/23/22   BARB Anne CNP   blood glucose test strips (ASCENSIA AUTODISC VI;ONE TOUCH ULTRA TEST VI) strip 1 each by In Vitro route daily As needed. 11/23/22   BARB Anne CNP   predniSONE (DELTASONE) 10 MG tablet 4 daily x 3 days then 3 daily x 3 days then 2 daily x 3 days then 1 daily x 3 days 11/23/22   BARB Anne CNP   labetalol (NORMODYNE) 200 MG tablet TAKE 1 TABLET BY MOUTH TWICE A DAY 11/4/22   Yancy Fire,    atorvastatin (LIPITOR) 80 MG tablet TAKE 1 TABLET BY MOUTH EVERY DAY 8/30/22   Cecilia Chaudhry MD   aspirin 81 MG EC tablet TAKE 1 TABLET BY MOUTH EVERY DAY 4/6/22   Cecilia Chaudhry MD   Biotin 1000 MCG TABS Take 1 tablet by mouth daily    Historical Provider, MD   albuterol sulfate (PROAIR RESPICLICK) 910 (90 Base) MCG/ACT aerosol powder inhalation Inhale 2 puffs into the lungs every 6 hours as needed for Wheezing or Shortness of Breath 3/4/21   BARB Page CNP   Lancets MISC 1 each by Does not apply route 2 times daily DX: E 11.9 FSBS daily.  Brand approved by insurance. 7/11/19 3/4/21  BARB Posada CNP   blood glucose test strips (ASCENSIA AUTODISC VI;ONE TOUCH ULTRA TEST VI) strip DX: E11.9 Brand approved by insurance. FSBS daily 7/10/19   BARB Posada CNP   blood glucose monitor kit and supplies by Other route daily E11.9. FSBS daily. Brand covered by insurance 7/8/19   BARB Posada CNP   acetaminophen (TYLENOL) 325 MG tablet Take 650 mg by mouth every 6 hours as needed for Pain    Historical Provider, MD   Multiple Vitamins-Minerals (THERAPEUTIC MULTIVITAMIN-MINERALS) tablet Take 1 tablet by mouth daily    Historical Provider, MD       Future Appointments   Date Time Provider Yudith Bailey   2/8/2023 10:20 AM MD Mary Ellen Fatima [facility]   3/8/2023  8:40 AM MD Mary Ellen Fatima [facility]    I agree with the Care Coordinator's Plan of Care  An electronic signature was used to authenticate this note.     --Alejandro Landers MD on 1/5/2023

## 2023-01-05 NOTE — TELEPHONE ENCOUNTER
Wrong office. Refill Request     CONFIRM preferrred pharmacy with the patient. If Mail Order Rx - Pend for 90 day refill. Last Seen: Last Seen Department: 3/4/2021  Last Seen by PCP: 8/26/2022    Last Written: 12/13/2022    If no future appointment scheduled, route STAFF MESSAGE with patient name to the [facility] for scheduling. Next Appointment:   Future Appointments   Date Time Provider Yudith Bailey   2/8/2023 10:20 AM MD Mary Ellen Valle [facility]   3/8/2023  8:40 AM MD Mary Ellen Valle [facility]       Message sent to  to schedule appt with patient?   NO      Requested Prescriptions     Pending Prescriptions Disp Refills    CVS D3 125 MCG (5000 UT) CAPS capsule [Pharmacy Med Name: [facility]S VITAMIN D3 125 MCG SOFTGEL] 30 capsule 5     Sig: TAKE 1 CAPSULE BY MOUTH EVERY DAY

## 2023-01-06 RX ORDER — CHOLECALCIFEROL (VITAMIN D3) 125 MCG
CAPSULE ORAL
Qty: 30 CAPSULE | Refills: 5 | Status: SHIPPED | OUTPATIENT
Start: 2023-01-06

## 2023-01-06 NOTE — CARE COORDINATION
Patient contacted this ACM inquiring if order placed for her lancets and Freestyle Laura  Noted orders were placed yesterday and informed patient.   Patient reports she has reviewed the hand-outs this ACM sent via My Chart  Discussed clinical Gabriela Castañeda in My Chart; patient utilizes  Discussed American Diabetes Association and provided web site  Discussed [facility] video regarding application of CGS; provided web site

## 2023-01-10 ENCOUNTER — CARE COORDINATION (OUTPATIENT)
Dept: CARE COORDINATION | Age: 48
End: 2023-01-10

## 2023-01-10 DIAGNOSIS — E11.9 TYPE 2 DIABETES MELLITUS WITHOUT COMPLICATION, WITHOUT LONG-TERM CURRENT USE OF INSULIN (HCC): ICD-10-CM

## 2023-01-10 RX ORDER — METFORMIN HYDROCHLORIDE 500 MG/1
1000 TABLET, EXTENDED RELEASE ORAL 2 TIMES DAILY
Qty: 120 TABLET | Refills: 1 | Status: SHIPPED | OUTPATIENT
Start: 2023-01-10

## 2023-01-10 NOTE — CARE COORDINATION
Ambulatory Care Coordination Note  1/10/2023    ACC: Brittni Armenta RN      Patient left message stating she had problems with the sensor on her Britton [employer] Cruz 3  This ACM contacted the Britton [employer] customer support and spoke to one of their representatives. They report the patient can contact their support  Team to have sensor replaced. This ACM returned patient's call and provided contact number to the Britton [employer] support team.  Encouraged patient to view Britton Jose Cruz 3 video prior to placing center  Encouraged patient to contact this ACM if any difficulty obtaining sensor replacement    Offered patient enrollment in the Remote Patient Monitoring (RPM) program for in-home monitoring: NA.    General Assessment    Do you have any symptoms that are causing concern?: No        Diabetes Assessment      Meal Planning: Carb counting   How often do you test your blood sugar?: Daily, Bedtime   Do you have barriers with adherence to non-pharmacologic self-management interventions? (Nutrition/Exercise/Self-Monitoring): No       No patient-reported symptoms           Care Coordination Interventions    Referral from Primary Care Provider: Yes  Suggested Interventions and Community Resources  Diabetes Education: In Process  Registered Dietician: In Process  Zone Management Tools: In Process          Goals Addressed                   This Visit's Progress       Care Coordination     Self Monitoring        Self-Monitored Blood Glucose - I will check my blood sugar Fasting blood sugar and random blood sugars  I will notify my provider of any trends of increasing or decreasing blood sugars over a 1 month period. I will notify my provider if I have any blood sugar readings less than 70 more than 2 times a month.     None Recently Recorded    Barriers: pain with finger sticks  Plan for overcoming my barriers: obtain CGM  Confidence: 10/10  Anticipated Goal Completion Date: 2/10/2023                Prior to Admission medications    Medication Sig Start Date End Date Taking? Authorizing Provider   metFORMIN (GLUCOPHAGE-XR) 500 MG extended release tablet TAKE 2 TABLETS BY MOUTH IN THE MORNING AND AT BEDTIME 1/10/23   Veena Kellogge, DO   CVS D3 125 MCG (5000 UT) CAPS capsule TAKE 1 CAPSULE BY MOUTH EVERY DAY 1/6/23   Veena Kellogge, DO   Continuous Blood Gluc Sensor (FREESTYLE KRISTIAN 3 SENSOR) MISC 1 Units by Does not apply route daily 1/5/23 2/8/23  Nata Lake MD   Lancets [facility] PLUS [id number]) MISC 1 Lancet by Does not apply route 3 times daily as needed (glucose monitoring) 1/5/23   Nata Lake MD   Lancet Devices [facility] PLUS LANCING) MISC 1 box by Does not apply route 3 times daily as needed (glocose monitoring) 1/5/23   Nata Lake MD   omeprazole (PRILOSEC) 40 MG delayed release capsule TAKE 1 CAPSULE BY MOUTH EVERY DAY IN THE MORNING BEFORE BREAKFAST 12/15/22   Veena Concepcion, DO   NIFEdipine (PROCARDIA XL) 60 MG extended release tablet  11/26/22   Historical Provider, MD   ONE TOUCH ULTRASOFT LANCETS MISC 1 each by Does not apply route daily 12/2/22   Nata Lake MD   glucose monitoring (FREESTYLE FREEDOM) kit 1 kit by Does not apply route daily 11/23/22   BARB Delgado CNP   blood glucose test strips (ASCENSIA AUTODISC VI;ONE TOUCH ULTRA TEST VI) strip 1 each by In Vitro route daily As needed.  11/23/22   BARB Delgado CNP   predniSONE (DELTASONE) 10 MG tablet 4 daily x 3 days then 3 daily x 3 days then 2 daily x 3 days then 1 daily x 3 days 11/23/22   BARB Delgado CNP   labetalol (NORMODYNE) 200 MG tablet TAKE 1 TABLET BY MOUTH TWICE A DAY 11/4/22   Veena Concepcion,    atorvastatin (LIPITOR) 80 MG tablet TAKE 1 TABLET BY MOUTH EVERY DAY 8/30/22   Glenny Gould MD   aspirin 81 MG EC tablet TAKE 1 TABLET BY MOUTH EVERY DAY 4/6/22   Glenny Gould MD   Biotin 1000 MCG TABS Take 1 tablet by mouth daily    Historical Provider, MD   albuterol sulfate (PROAIR RESPICLICK) 482 (90 Base) MCG/ACT aerosol powder inhalation Inhale 2 puffs into the lungs every 6 hours as needed for Wheezing or Shortness of Breath 3/4/21   BARB Hunter CNP   Lancets MISC 1 each by Does not apply route 2 times daily DX: E 11.9 FSBS daily. Brand approved by insurance. 7/11/19 3/4/21  BARB Hunter CNP   blood glucose test strips (ASCENSIA AUTODISC VI;ONE TOUCH ULTRA TEST VI) strip DX: E11.9 Brand approved by insurance. FSBS daily 7/10/19   BARB Hunter CNP   blood glucose monitor kit and supplies by Other route daily E11.9. FSBS daily.  Brand covered by insurance 7/8/19   BARB Hunter CNP   acetaminophen (TYLENOL) 325 MG tablet Take 650 mg by mouth every 6 hours as needed for Pain    Historical Provider, MD   Multiple Vitamins-Minerals (THERAPEUTIC MULTIVITAMIN-MINERALS) tablet Take 1 tablet by mouth daily    Historical Provider, MD       Future Appointments   Date Time Provider Yudith Bailey   2/8/2023 10:20 AM MD Mary Ellen Serra [facility]   3/8/2023  8:40 AM MD Mary Ellen Serra [facility]

## 2023-01-11 ENCOUNTER — CARE COORDINATION (OUTPATIENT)
Dept: CARE COORDINATION | Age: 48
End: 2023-01-11

## 2023-01-11 NOTE — CARE COORDINATION
Contacted Miguelina Zuniga and left voicemail regarding Dietitian referral. Left call back number and will follow up as appropriate.        Elliott Romero, [address] [place] [address],   [phone]

## 2023-01-23 ENCOUNTER — CARE COORDINATION (OUTPATIENT)
Dept: CARE COORDINATION | Age: 48
End: 2023-01-23

## 2023-01-23 NOTE — CARE COORDINATION
Contacted Latonia Vincent and left voicemail regarding Dietitian referral. Left call back number and will follow up as appropriate.        Tone Duong, [address] [place] [address], [place]  [phone]

## 2023-01-30 ENCOUNTER — CARE COORDINATION (OUTPATIENT)
Dept: CARE COORDINATION | Age: 48
End: 2023-01-30

## 2023-01-30 NOTE — CARE COORDINATION
Mariluz Lawrence  January 30, 2023    Initial Referral Reason: DM    Patient Care Team:  Leo Hudson DO as PCP - General (Family Medicine)  Leo Hudson DO as PCP - [facility]  Anshul Landaverde MD as Consulting Physician (Cardiology)  Markus Mcdonald MD as Consulting Physician (Otolaryngology)  Lexy Morelos, RN as Ambulatory Care Manager  Rafal Adkins RD as Dietitian (Dietitian Registered)    Past Medical History:    Current Outpatient Medications   Medication Sig Dispense Refill    metFORMIN (GLUCOPHAGE-XR) 500 MG extended release tablet TAKE 2 TABLETS BY MOUTH IN THE MORNING AND AT BEDTIME 120 tablet 1    CVS D3 125 MCG (5000 UT) CAPS capsule TAKE 1 CAPSULE BY MOUTH EVERY DAY 30 capsule 5    Continuous Blood Gluc Sensor (FREESTYLE KRISTIAN 3 SENSOR) MISC 1 Units by Does not apply route daily 1 each 12    Lancets (ONETOUCH DELICA PLUS [id number]) MISC 1 Lancet by Does not apply route 3 times daily as needed (glucose monitoring) 100 each 12    Lancet Devices (ONETOUCH DELICA PLUS LANCING) MISC 1 box by Does not apply route 3 times daily as needed (glocose monitoring) 1 each 12    omeprazole (PRILOSEC) 40 MG delayed release capsule TAKE 1 CAPSULE BY MOUTH EVERY DAY IN THE MORNING BEFORE BREAKFAST 30 capsule 5    NIFEdipine (PROCARDIA XL) 60 MG extended release tablet       ONE TOUCH ULTRASOFT LANCETS MISC 1 each by Does not apply route daily 100 each 3    glucose monitoring (FREESTYLE FREEDOM) kit 1 kit by Does not apply route daily 1 kit 0    blood glucose test strips (ASCENSIA AUTODISC VI;ONE TOUCH ULTRA TEST VI) strip 1 each by In Vitro route daily As needed.  100 each 3    predniSONE (DELTASONE) 10 MG tablet 4 daily x 3 days then 3 daily x 3 days then 2 daily x 3 days then 1 daily x 3 days 30 tablet 0    labetalol (NORMODYNE) 200 MG tablet TAKE 1 TABLET BY MOUTH TWICE A  tablet 0    atorvastatin (LIPITOR) 80 MG tablet TAKE 1 TABLET BY MOUTH EVERY DAY 90 tablet 3    aspirin 81 MG EC tablet TAKE 1 TABLET BY MOUTH EVERY DAY 90 tablet 3    Biotin 1000 MCG TABS Take 1 tablet by mouth daily      albuterol sulfate (PROAIR RESPICLICK) 823 (90 Base) MCG/ACT aerosol powder inhalation Inhale 2 puffs into the lungs every 6 hours as needed for Wheezing or Shortness of Breath 1 Inhaler 5    Lancets MISC 1 each by Does not apply route 2 times daily DX: E 11.9 FSBS daily. Brand approved by insurance. 100 each 5    blood glucose test strips (ASCENSIA AUTODISC VI;ONE TOUCH ULTRA TEST VI) strip DX: E11.9 Brand approved by insurance. FSBS daily 50 strip 5    blood glucose monitor kit and supplies by Other route daily E11.9. FSBS daily. Brand covered by insurance 1 kit 0    acetaminophen (TYLENOL) 325 MG tablet Take 650 mg by mouth every 6 hours as needed for Pain      Multiple Vitamins-Minerals (THERAPEUTIC MULTIVITAMIN-MINERALS) tablet Take 1 tablet by mouth daily       No current facility-administered medications for this visit.        Biochemical Data, Medical Tests and Procedures:    Lab Results   Component Value Date    LABA1C 9.1 12/02/2022     Lab Results   Component Value Date    .0 08/26/2022       Lab Results   Component Value Date    CHOL 192 08/26/2022    CHOL 213 (H) 05/14/2021    CHOL 222 (H) 03/23/2021     Lab Results   Component Value Date    TRIG 118 08/26/2022    TRIG 84 05/14/2021    TRIG 118 03/23/2021     Lab Results   Component Value Date    HDL 57 08/26/2022    HDL 49 05/14/2021    HDL 50 03/23/2021     Lab Results   Component Value Date    LDLCALC 111 (H) 08/26/2022    LDLCALC 147 (H) 05/14/2021    LDLCALC 148 (H) 03/23/2021     Lab Results   Component Value Date    LABVLDL 24 08/26/2022    LABVLDL 17 05/14/2021    LABVLDL 24 03/23/2021     No results found for: CHOLHDLRATIO    Lab Results   Component Value Date    WBC 8.5 08/26/2022    HGB 14.9 08/26/2022    HCT 43.3 08/26/2022    MCV 92.2 08/26/2022     08/26/2022       Lab Results   Component Value Date    CREATININE 0.7 08/26/2022    BUN 18 08/26/2022     (L) 08/26/2022    K 4.6 08/26/2022     08/26/2022    CO2 22 08/26/2022         Anthropometric Measurements:    Height: 63\"  Weight: 150 lb 9.6 oz (68.3 kg) (12/2/22)  BMI: 26.68 kg/m2 (overweight)  IBW: 115 lb +-10%  %IBW: 130.4 %    Physical Exam Findings:  Deferred    Nutrition Interview: RD called pt, explained reason for call and role in care. Pt states her main nutrition concern is knowing what to eat to keep blood sugars more stabilized. RD explained the importance of having a consistent carbohydrate intake throughout the day to help keep blood sugars steady, avoiding spikes and dips. Reviewed which foods contain carbohydrates and which foods do not contain carbohydrates. Explained carbohydrates are the main source of fuel for our bodies and the importance of choosing healthy sources such as whole grains, fruits and vegetables. Explained carbohydrates in food raise blood glucose and the importance of having balanced meals/snacks to help keep blood sugar steady. Discussed how eating a carbohydrate alone such as a banana versus a banana with peanut butter will affect blood sugar differently. Explained the components of a balanced meal using the MyPlate [id number]-3/3 plate fruits and/or vegetables, 1/4 plate protein and 1/4 plate starchy carbohydrates with 8 oz glass of low fat milk if desired. RD offered to mail educational handouts to pt to reinforce concepts discussed during phone conversation, pt was agreeable. RD verified patient's home address.     24-Hour Diet Recall  Breakfast  Consumed: Peanut butter toast    Lunch  Consumed: [employer] sub sandwich    Dinner  Consumed: Josephine  - 1 C noodles, hotdog, chili, cheese, and side salad    Snacks  Consumed: Potato chips, cheese    Beverages: Water, diet soda    Frequency of meals away from home: 1-3 x/week    Exercise: None    Blood sugar checks: Various times throughout the day, wears a CGM    Nutrition Diagnosis:  #1 Problem Food and nutrition-related knowledge deficit       Etiology related to lack of prior nutrition education       Signs/Symptoms as evidenced by elevated HgbA1C, h/o hyperglycemia     Nutrition Intervention:     Estimated Needs  diabetic diet providing 1800 kcals to promote weight maintenance ([address] based on CBW). Estimated daily CHO Needs: 200-290 g (based on 45-65% total calorie intake)  Estimated daily Protein Needs: 70-80 g (based on 1.0-1.2 g/kg based on CBW)  Estimated daily Fluid Needs: 2050 mL fluid/d (based on 30 mL/kg based on CBW)    #1 Nutrition Information: Provided patient with Understanding Nutrition Handouts, Physical Activity and DM, Managing Serving and Portion Sizes, Simple Swaps and Substitutions, Reading a Nutrition Facts Labels, Building Balanced Meals, Healthy Living Supermarket Roadmap, Power of Protein for People with DM, Pro/CHO Snacks, Sample 7 Day DM Meal Plan, What Can I Eat- Best Foods for DM handouts for reinforcement of concepts discussed during nutrition interview. #2 Nutrition Counseling: Used open-ended questions to assess patients perceived susceptibility and severity of disease state. Discussed potential impact of health threat on patient's lifestyle. Used open-ended questions to assess patient's perception regarding benefits of and barriers to implementation of nutrition therapy. #3 Nutrition Education: Clearly defined the benefits of nutrition therapy. Summarized and affirmed positive aspects of current nutrition patterns. Provided education regarding value of adherence to diabetic diet. Discussed ways to establish applying concepts of alternatives and choices regarding implementation of diet. Explored ideas for small, incremental goals to initiate behavior change.       Monitoring and Evaluation:    Indicator/Goal Criteria   #1 Pair protein with carbohydrates  #1 I will have source of protein with source of carbohydrates   #2 Eat consistently throughout the day  #2 I will eat three meals per day or four to six small meals/snacks per day   #3 Follow MyPlate guidelines #3 I will build balanced meals using the MyPlate reference: 1/2 plate fruits and/or vegetables, 1/4 plate protein, and 1/4 plate starchy carbohydrates with 8 oz glass of low fat milk if desired     Follow Up: RD will call pt in three to four weeks to follow up and make sure pt received handouts in mail. RD will answer any nutrition related questions at this time.      Young [address],    [phone]

## 2023-02-01 DIAGNOSIS — I10 ESSENTIAL HYPERTENSION: ICD-10-CM

## 2023-02-01 RX ORDER — LABETALOL 200 MG/1
TABLET, FILM COATED ORAL
Qty: 180 TABLET | Refills: 3 | Status: SHIPPED | OUTPATIENT
Start: 2023-02-01

## 2023-02-01 NOTE — TELEPHONE ENCOUNTER
LOV 3/4/2021    Future Appointments   Date Time Provider Yudith Bailey   2/8/2023 10:20 AM MD Mary Ellen Velez [facility]   3/8/2023  8:40 AM MD Mary Ellen Velez [facility]

## 2023-02-04 DIAGNOSIS — E11.9 TYPE 2 DIABETES MELLITUS WITHOUT COMPLICATION, WITHOUT LONG-TERM CURRENT USE OF INSULIN (HCC): ICD-10-CM

## 2023-02-06 NOTE — TELEPHONE ENCOUNTER
Future Appointments   Date Time Provider Yudith Bailey   2/8/2023 10:20 AM MD Mary Ellen Hayes [facility]   3/8/2023  8:40 AM MD Mary Ellen Hayes [facility]     [facility] 12/2/2022

## 2023-02-07 RX ORDER — METFORMIN HYDROCHLORIDE 500 MG/1
1000 TABLET, EXTENDED RELEASE ORAL 2 TIMES DAILY
Qty: 120 TABLET | Refills: 1 | Status: SHIPPED | OUTPATIENT
Start: 2023-02-07

## 2023-02-08 ENCOUNTER — OFFICE VISIT (OUTPATIENT)
Dept: ENDOCRINOLOGY | Age: 48
End: 2023-02-08
Payer: COMMERCIAL

## 2023-02-08 VITALS
DIASTOLIC BLOOD PRESSURE: 84 MMHG | BODY MASS INDEX: 26.65 KG/M2 | SYSTOLIC BLOOD PRESSURE: 133 MMHG | HEIGHT: 63 IN | OXYGEN SATURATION: 98 % | HEART RATE: 64 BPM | WEIGHT: 150.4 LBS

## 2023-02-08 DIAGNOSIS — E11.69 DYSLIPIDEMIA ASSOCIATED WITH TYPE 2 DIABETES MELLITUS (HCC): ICD-10-CM

## 2023-02-08 DIAGNOSIS — I10 ESSENTIAL HYPERTENSION: ICD-10-CM

## 2023-02-08 DIAGNOSIS — E78.5 DYSLIPIDEMIA ASSOCIATED WITH TYPE 2 DIABETES MELLITUS (HCC): ICD-10-CM

## 2023-02-08 DIAGNOSIS — E11.65 TYPE 2 DIABETES MELLITUS WITH HYPERGLYCEMIA, WITHOUT LONG-TERM CURRENT USE OF INSULIN (HCC): Primary | ICD-10-CM

## 2023-02-08 PROBLEM — E08.00 DIABETES MELLITUS DUE TO UNDERLYING CONDITION WITH HYPEROSMOLARITY WITHOUT COMA, WITHOUT LONG-TERM CURRENT USE OF INSULIN (HCC): Status: ACTIVE | Noted: 2023-02-08

## 2023-02-08 PROCEDURE — 3046F HEMOGLOBIN A1C LEVEL >9.0%: CPT | Performed by: INTERNAL MEDICINE

## 2023-02-08 PROCEDURE — 2022F DILAT RTA XM EVC RTNOPTHY: CPT | Performed by: INTERNAL MEDICINE

## 2023-02-08 PROCEDURE — G8482 FLU IMMUNIZE ORDER/ADMIN: HCPCS | Performed by: INTERNAL MEDICINE

## 2023-02-08 PROCEDURE — 3080F DIAST BP >= 90 MM HG: CPT | Performed by: INTERNAL MEDICINE

## 2023-02-08 PROCEDURE — 3074F SYST BP LT 130 MM HG: CPT | Performed by: INTERNAL MEDICINE

## 2023-02-08 PROCEDURE — G8419 CALC BMI OUT NRM PARAM NOF/U: HCPCS | Performed by: INTERNAL MEDICINE

## 2023-02-08 PROCEDURE — G8427 DOCREV CUR MEDS BY ELIG CLIN: HCPCS | Performed by: INTERNAL MEDICINE

## 2023-02-08 PROCEDURE — 99204 OFFICE O/P NEW MOD 45 MIN: CPT | Performed by: INTERNAL MEDICINE

## 2023-02-08 PROCEDURE — 1036F TOBACCO NON-USER: CPT | Performed by: INTERNAL MEDICINE

## 2023-02-08 PROCEDURE — 95251 CONT GLUC MNTR ANALYSIS I&R: CPT | Performed by: INTERNAL MEDICINE

## 2023-02-08 NOTE — PROGRESS NOTES
Patient ID:   Hermelinda Ennis is a 50 y.o. female     Chief Complaint:   Hermelinda Ennis presents for an initial evaluation of Type 2 Diabetes Mellitus , Hyperlipidemia and hypertension. Subjective:   Gestational DM with every pregnancy. First pregnancy at age 19 and last pregnancy at age 37. Type 2 Diabetes Mellitus diagnosed in 8.7%      Metformin ER 1000mg bid     Using Laura 3     Checks blood sugars 5 times per day. Reviewed/Reported  AM:   Lunch:  Supper:   HS:     Hypoglycemias: None. She had hypoglycemias with Glyburide. Meals: Three, dinner is bigger. One snack at bedtime ( bowl of cereal, cheese sticks, PB toast). Drinks diet sodas. Exercise: None    Saw nutritionist with her work recently. She has seen Virginia May in the best    Denies chest pain, exertional dyspnea. Maternal GM with diabetes. Father was diabetic at age 54. Younger brother with DM diagnosed at age 39. An Younger brother is hypoglycemic, not sure of he has DM. Maternal GM had DM. Family history of CAD: Father had Mi at age 46.    Denies smoking  Currently on ASA 81 mg daily     The following portions of the patient's history were reviewed and updated as appropriate:       Family History   Problem Relation Age of Onset    High Blood Pressure Mother     Hypertension Mother     High Blood Pressure Father     Cancer Father 52        colorectal    Diabetes Father     Hypertension Father     Heart Attack Father     Other Father         GERD-surgery    Diabetes Brother     Other Brother         GERD-surgery    Breast Cancer Paternal Aunt     Breast Cancer Paternal Uncle     Hypertension Maternal Grandmother     Diabetes Maternal Grandmother     Hypertension Maternal Grandfather     Other Maternal Grandfather         gastric ulcers requiring surgery    Dementia Maternal Grandfather         after head injurt    Heart Failure Paternal Grandmother     Diabetes Paternal Grandmother     Cancer Paternal Grandfather         lung Breast Cancer Paternal Grandfather           Social History     Socioeconomic History    Marital status:      Spouse name: Not on file    Number of children: Not on file    Years of education: Not on file    Highest education level: Not on file   Occupational History    Not on file   Tobacco Use    Smoking status: Never    Smokeless tobacco: Never   Vaping Use    Vaping Use: Never used   Substance and Sexual Activity    Alcohol use: Yes     Comment: 2 times per year    Drug use: No    Sexual activity: Yes     Partners: Male   Other Topics Concern    Not on file   Social History Narrative    Not on file     Social Determinants of Health     Financial Resource Strain: Low Risk     Difficulty of Paying Living Expenses: Not hard at all   Food Insecurity: No Food Insecurity    Worried About Running Out of Food in the Last Year: Never true    Ran Out of Food in the Last Year: Never true   Transportation Needs: Not on file   Physical Activity: Not on file   Stress: Not on file   Social Connections: Not on file   Intimate Partner Violence: Not on file   Housing Stability: Unknown    Unable to Pay for Housing in the Last Year: No    Number of [place] in the Last Year: Not on file    Unstable Housing in the Last Year: No        Past Medical History:   Diagnosis Date    Chest pain 05/2021    + NM stress test    DM (diabetes mellitus) ([facility] [address]     Hypertension         Past Surgical History:   Procedure Laterality Date    CARDIAC CATHETERIZATION  05/14/2021    Non Obs CAD, medical management, <20% stenosis          Allergies   Allergen Reactions    Eggs Or Egg-Derived Products Nausea Only     Yolk. Takes flu shot yearly without problems.            Current Outpatient Medications:     empagliflozin (JARDIANCE) 25 MG tablet, Take 1 tablet by mouth daily, Disp: 90 tablet, Rfl: 1    metFORMIN (GLUCOPHAGE-XR) 500 MG extended release tablet, TAKE 2 TABLETS BY MOUTH IN THE MORNING AND AT BEDTIME (Patient taking differently: Take 2,000 mg by mouth daily (with breakfast)), Disp: 120 tablet, Rfl: 1    labetalol (NORMODYNE) 200 MG tablet, TAKE 1 TABLET BY MOUTH TWICE A DAY, Disp: 180 tablet, Rfl: 3    [employer]S D3 125 MCG (5000 UT) CAPS capsule, TAKE 1 CAPSULE BY MOUTH EVERY DAY, Disp: 30 capsule, Rfl: 5    Continuous Blood Gluc Sensor (FREESTYLE KRISTIAN 3 SENSOR) MISC, 1 Units by Does not apply route daily, Disp: 1 each, Rfl: 12    Lancets (ONETOUCH DELICA PLUS IIRYBM79G) MISC, 1 Lancet by Does not apply route 3 times daily as needed (glucose monitoring), Disp: 100 each, Rfl: 12    Lancet Devices (ONETOUCH DELICA PLUS LANCING) MISC, 1 box by Does not apply route 3 times daily as needed (glocose monitoring), Disp: 1 each, Rfl: 12    omeprazole (PRILOSEC) 40 MG delayed release capsule, TAKE 1 CAPSULE BY MOUTH EVERY DAY IN THE MORNING BEFORE BREAKFAST, Disp: 30 capsule, Rfl: 5    ONE TOUCH ULTRASOFT LANCETS MISC, 1 each by Does not apply route daily, Disp: 100 each, Rfl: 3    atorvastatin (LIPITOR) 80 MG tablet, TAKE 1 TABLET BY MOUTH EVERY DAY, Disp: 90 tablet, Rfl: 3    aspirin 81 MG EC tablet, TAKE 1 TABLET BY MOUTH EVERY DAY, Disp: 90 tablet, Rfl: 3    albuterol sulfate (PROAIR RESPICLICK) 720 (90 Base) MCG/ACT aerosol powder inhalation, Inhale 2 puffs into the lungs every 6 hours as needed for Wheezing or Shortness of Breath, Disp: 1 Inhaler, Rfl: 5    blood glucose test strips (ASCENSIA AUTODISC VI;ONE TOUCH ULTRA TEST VI) strip, DX: E11.9 Brand approved by insurance. FSBS daily, Disp: 50 strip, Rfl: 5    Lancets MISC, 1 each by Does not apply route 2 times daily DX: E 11.9 FSBS daily. Brand approved by insurance., Disp: 100 each, Rfl: 5     Review of Systems:    Constitutional: Negative for fever, chills, and unexpected weight change. HENT: Negative for congestion, ear pain, rhinorrhea,  sore throat and trouble swallowing. Eyes: Negative for photophobia, redness, itching. Respiratory: Negative for cough, shortness of breath and sputum. Cardiovascular: Negative for chest pain, palpitations and leg swelling. Gastrointestinal: Negative for nausea, vomiting, abdominal pain, diarrhea, constipation. Endocrine: Negative for cold intolerance, heat intolerance, polydipsia, polyphagia and polyuria. Genitourinary: Negative for dysuria, urgency, frequency, hematuria and flank pain. Musculoskeletal: Negative for myalgias, back pain, arthralgias and neck pain. Skin/Nail: Negative for rash, itching. Normal nails. Neurological: Negative for seizures, weakness, light-headedness, numbness and headaches. Hematological/ Lymph nodes: Negative for adenopathy. Does not bruise/bleed easily. Psychiatric/Behavioral: Negative for suicidal ideas, depression, anxiety, sleep disturbance and decreased concentration. Objective:   Physical Exam:  BP (!) 124/94 (Site: Left Upper Arm, Position: Sitting, Cuff Size: Medium Adult)   Pulse 64   Ht 5' 3\" (1.6 m)   Wt 150 lb 6.4 oz (68.2 kg)   LMP 01/24/2023   SpO2 98%   BMI 26.64 kg/m²    Constitutional: Patient is oriented to person, place, and time. Patient appears well-developed and well-nourished. HENT:              Head: Normocephalic and atraumatic. Eyes: Conjunctivae and EOM are normal.               Neck: Normal range of motion. Thyroid normal.   Cardiovascular: Normal rate, regular rhythm and normal heart sounds. Pulmonary/Chest: Effort normal and breath sounds normal.    Musculoskeletal: Normal range of motion. Neurological: Patient is alert and oriented to person, place, and time. Patient has normal reflexes. Skin: Skin is warm and dry. Psychiatric: Patient has a normal mood and affect.  Patient behavior is normal.     Lab Review:    Office Visit on 12/02/2022   Component Date Value Ref Range Status    Hemoglobin A1C 12/02/2022 9.1  % Final    Microalb, Ur 12/02/2022 80 mg/L   Final    Creatinine Ur POCT 12/02/2022 300 mg/dL   Final    Microalbumin Creatinine Ratio 12/02/2022 <30mg/g   Final   Orders Only on 08/26/2022   Component Date Value Ref Range Status    WBC 08/26/2022 8.5  4.0 - 11.0 K/uL Final    RBC 08/26/2022 4.70  4.00 - 5.20 M/uL Final    Hemoglobin 08/26/2022 14.9  12.0 - 16.0 g/dL Final    Hematocrit 08/26/2022 43.3  36.0 - 48.0 % Final    MCV 08/26/2022 92.2  80.0 - 100.0 fL Final    MCH 08/26/2022 31.8  26.0 - 34.0 pg Final    MCHC 08/26/2022 34.5  31.0 - 36.0 g/dL Final    RDW 08/26/2022 12.8  12.4 - 15.4 % Final    Platelets 96/03/2337 262  135 - 450 K/uL Final    MPV 08/26/2022 8.8  5.0 - 10.5 fL Final    Sodium 08/26/2022 134 (A)  136 - 145 mmol/L Final    Potassium 08/26/2022 4.6  3.5 - 5.1 mmol/L Final    Chloride 08/26/2022 100  99 - 110 mmol/L Final    CO2 08/26/2022 22  21 - 32 mmol/L Final    Anion Gap 08/26/2022 12  3 - 16 Final    Glucose 08/26/2022 277 (A)  70 - 99 mg/dL Final    BUN 08/26/2022 18  7 - 20 mg/dL Final    Creatinine 08/26/2022 0.7  0.6 - 1.1 mg/dL Final    GFR Non- 08/26/2022 >60  >60 Final    GFR  08/26/2022 >60  >60 Final    Calcium 08/26/2022 9.4  8.3 - 10.6 mg/dL Final    Total Protein 08/26/2022 7.0  6.4 - 8.2 g/dL Final    Albumin 08/26/2022 4.8  3.4 - 5.0 g/dL Final    Albumin/Globulin Ratio 08/26/2022 2.2  1.1 - 2.2 Final    Total Bilirubin 08/26/2022 0.7  0.0 - 1.0 mg/dL Final    Alkaline Phosphatase 08/26/2022 94  40 - 129 U/L Final    ALT 08/26/2022 19  10 - 40 U/L Final    AST 08/26/2022 15  15 - 37 U/L Final    Cholesterol, Total 08/26/2022 192  0 - 199 mg/dL Final    Triglycerides 08/26/2022 118  0 - 150 mg/dL Final    HDL 08/26/2022 57  40 - 60 mg/dL Final    LDL Calculated 08/26/2022 111 (A)  <100 mg/dL Final    VLDL Cholesterol Calculated 08/26/2022 24  Not Established mg/dL Final    Vit D, 25-Hydroxy 08/26/2022 33.7  >=30 ng/mL Final   Office Visit on 08/26/2022   Component Date Value Ref Range Status    Hemoglobin A1C 08/26/2022 8.7  See comment % Final    eAG 08/26/2022 203.0 mg/dL Final           Assessment and Plan     Blue Coronel was seen today for new patient and diabetes. Diagnoses and all orders for this visit:    Type 2 diabetes mellitus with hyperglycemia, without long-term current use of insulin (HCC)  -     empagliflozin (JARDIANCE) 25 MG tablet; Take 1 tablet by mouth daily  -     Hemoglobin A1C; Future  -     Lipid, Fasting; Future  -     TSH with Reflex to FT4; Future  -     HM DIABETES FOOT EXAM    Dyslipidemia associated with type 2 diabetes mellitus (HCC)  -     empagliflozin (JARDIANCE) 25 MG tablet; Take 1 tablet by mouth daily  -     Hemoglobin A1C; Future  -     Lipid, Fasting; Future  -     TSH with Reflex to FT4; Future  -     HM DIABETES FOOT EXAM    Essential hypertension  -     empagliflozin (JARDIANCE) 25 MG tablet; Take 1 tablet by mouth daily  -     Hemoglobin A1C; Future  -     Lipid, Fasting; Future  -     TSH with Reflex to FT4; Future  -     HM DIABETES FOOT EXAM         1: Type 2 DM complicated with hyperglycemia   Uncontrolled A1C 9.1% Dec 2022   Target < 7%     Diagnosed with DM less than 25, and BMI around 25-26. She could be TOMI. Freestyle david personal CGMS data downloaded and reviewed   Average glucose: 180  Blood sugars: Above 180 - 44% ,  - 56%, below 70 - 0%  Blood sugars are somewhat better in am   Blood sugars higher after meal    During the day blood sugars are running well   Post meal hyperglycemia after carb diet   No activity related changes in blood sugars   Hypoglycemia: None   Based on the data, changes below     C/w Metformin ER 1000 mg bid     Add Jardiance 25 mg daily   Risk of UTI discussed      Consider adding DPPIV-I or low dose BAPTISTE     All instructions provided in written. Check Blood sugars 2-3 times per day. Log them along with insulin and send them every 2 weeks. Call for blood sugars less than 60 or more than 400. Eye exam: Last exam in Dec 2022, denies retinopathy.    Foot exam:  Feb 2023   Deformity/amputation: absent  Skin lesions/pre-ulcerative calluses: absent  Edema: right- negative, left- negative  Sensory exam: Monofilament sensation: normal  Pulses: normal, Vibration (128 Hz): Intact    Renal screen: Dec 2022  TSH screen: 2015     2: HTN   Controlled     3: Hyperlipidemia   LDL: 111 , HDL: 57 , TGs: 118 - Aug 2022  C/w atorvastatin 80 mg daily    If LDL > 100 add zetia     RTC in a month with A1C, lipids, TSH     EDUCATION:   Greater than 50% of this visit was spent in general counseling regarding obesity, diet, exercise, importance of adherence to insulin regime, recognition and treatment of hypo and hyperglycemia,  glucose logging, proper diabetes management, diabetic complications with poor management and the importance of glycemic control in order to avoid the complications of diabetes. Risks and potential complications of diabetes were reviewed with the patient. Diabetes health maintenance plan and follow-up were discussed and understood by the patient. We reviewed the importance of medication compliance and regular follow-up. Aggressive lifestyle modification was encouraged. Exercise Counselling: This patient is a candidate for regular physical exercise. Instructions to perform the following types of exercise:  Swimming or water aerobic exercise  Brisk walking  Playing tennis  Stationary bicycle or elliptical indoor  Low impact aerobic exercise    Instructions given to exercise for the following duration:  30 minutes a day for five-seven days per week.     Following instructions for being active throughout the day in addition to formal exercise:  Walk instead of drive whenever possible  Take the stairs instead of the elevator  Work in the garden  Park to the far end of the parking lot to add more walking steps to destination      Electronically signed by Silas Brian MD on 2/8/2023 at 11:01 AM

## 2023-02-15 ENCOUNTER — CARE COORDINATION (OUTPATIENT)
Dept: CARE COORDINATION | Age: 48
End: 2023-02-15

## 2023-03-02 DIAGNOSIS — E11.9 TYPE 2 DIABETES MELLITUS WITHOUT COMPLICATION, WITHOUT LONG-TERM CURRENT USE OF INSULIN (HCC): ICD-10-CM

## 2023-03-02 NOTE — TELEPHONE ENCOUNTER
Last ov 12/02/2022   Future Appointments   Date Time Provider Yudith Bailey   3/8/2023  8:40 AM Nicolas Valles MD [facility]

## 2023-03-03 RX ORDER — METFORMIN HYDROCHLORIDE 500 MG/1
1000 TABLET, EXTENDED RELEASE ORAL 2 TIMES DAILY
Qty: 120 TABLET | Refills: 1 | Status: SHIPPED | OUTPATIENT
Start: 2023-03-03

## 2023-03-06 ENCOUNTER — CARE COORDINATION (OUTPATIENT)
Dept: CARE COORDINATION | Age: 48
End: 2023-03-06

## 2023-03-06 ENCOUNTER — TELEMEDICINE (OUTPATIENT)
Dept: FAMILY MEDICINE CLINIC | Age: 48
End: 2023-03-06
Payer: COMMERCIAL

## 2023-03-06 DIAGNOSIS — E08.00 DIABETES MELLITUS DUE TO UNDERLYING CONDITION WITH HYPEROSMOLARITY WITHOUT COMA, WITHOUT LONG-TERM CURRENT USE OF INSULIN (HCC): ICD-10-CM

## 2023-03-06 DIAGNOSIS — I10 ESSENTIAL HYPERTENSION: ICD-10-CM

## 2023-03-06 DIAGNOSIS — U07.1 COVID: Primary | ICD-10-CM

## 2023-03-06 PROCEDURE — G8427 DOCREV CUR MEDS BY ELIG CLIN: HCPCS | Performed by: NURSE PRACTITIONER

## 2023-03-06 PROCEDURE — G8419 CALC BMI OUT NRM PARAM NOF/U: HCPCS | Performed by: NURSE PRACTITIONER

## 2023-03-06 PROCEDURE — G8482 FLU IMMUNIZE ORDER/ADMIN: HCPCS | Performed by: NURSE PRACTITIONER

## 2023-03-06 PROCEDURE — 99214 OFFICE O/P EST MOD 30 MIN: CPT | Performed by: NURSE PRACTITIONER

## 2023-03-06 PROCEDURE — 1036F TOBACCO NON-USER: CPT | Performed by: NURSE PRACTITIONER

## 2023-03-06 RX ORDER — FLUTICASONE PROPIONATE 50 MCG
SPRAY, SUSPENSION (ML) NASAL
Qty: 18 G | Refills: 0 | Status: SHIPPED | OUTPATIENT
Start: 2023-03-06

## 2023-03-06 RX ORDER — DEXTROMETHORPHAN HYDROBROMIDE AND PROMETHAZINE HYDROCHLORIDE 15; 6.25 MG/5ML; MG/5ML
5 SYRUP ORAL 4 TIMES DAILY PRN
Qty: 240 ML | Refills: 0 | Status: SHIPPED | OUTPATIENT
Start: 2023-03-06

## 2023-03-06 RX ORDER — BENZONATATE 200 MG/1
200 CAPSULE ORAL 3 TIMES DAILY PRN
Qty: 30 CAPSULE | Refills: 0 | Status: SHIPPED | OUTPATIENT
Start: 2023-03-06

## 2023-03-06 SDOH — ECONOMIC STABILITY: FOOD INSECURITY: WITHIN THE PAST 12 MONTHS, YOU WORRIED THAT YOUR FOOD WOULD RUN OUT BEFORE YOU GOT MONEY TO BUY MORE.: NEVER TRUE

## 2023-03-06 SDOH — ECONOMIC STABILITY: FOOD INSECURITY: WITHIN THE PAST 12 MONTHS, THE FOOD YOU BOUGHT JUST DIDN'T LAST AND YOU DIDN'T HAVE MONEY TO GET MORE.: NEVER TRUE

## 2023-03-06 SDOH — ECONOMIC STABILITY: INCOME INSECURITY: HOW HARD IS IT FOR YOU TO PAY FOR THE VERY BASICS LIKE FOOD, HOUSING, MEDICAL CARE, AND HEATING?: NOT VERY HARD

## 2023-03-06 SDOH — ECONOMIC STABILITY: INCOME INSECURITY: HOW HARD IS IT FOR YOU TO PAY FOR THE VERY BASICS LIKE FOOD, HOUSING, MEDICAL CARE, AND HEATING?: NOT HARD AT ALL

## 2023-03-06 ASSESSMENT — ENCOUNTER SYMPTOMS
SORE THROAT: 1
NAUSEA: 1
WHEEZING: 1
SHORTNESS OF BREATH: 0
BLOOD IN STOOL: 0
COUGH: 1
VOMITING: 1
ABDOMINAL PAIN: 0
VISUAL CHANGE: 0
CHEST TIGHTNESS: 1
SINUS PRESSURE: 1
CHANGE IN BOWEL HABIT: 1
CONSTIPATION: 0
SWOLLEN GLANDS: 0

## 2023-03-06 ASSESSMENT — ANXIETY QUESTIONNAIRES
2. NOT BEING ABLE TO STOP OR CONTROL WORRYING: 0
6. BECOMING EASILY ANNOYED OR IRRITABLE: 0
5. BEING SO RESTLESS THAT IT IS HARD TO SIT STILL: 0
GAD7 TOTAL SCORE: 0
IF YOU CHECKED OFF ANY PROBLEMS ON THIS QUESTIONNAIRE, HOW DIFFICULT HAVE THESE PROBLEMS MADE IT FOR YOU TO DO YOUR WORK, TAKE CARE OF THINGS AT HOME, OR GET ALONG WITH OTHER PEOPLE: NOT DIFFICULT AT ALL
4. TROUBLE RELAXING: 0
1. FEELING NERVOUS, ANXIOUS, OR ON EDGE: 0
3. WORRYING TOO MUCH ABOUT DIFFERENT THINGS: 0
7. FEELING AFRAID AS IF SOMETHING AWFUL MIGHT HAPPEN: 0

## 2023-03-06 NOTE — CARE COORDINATION
Asha Needs  3/6/2023    Registered Dietitian Progress Note for Care Coordination    Assessment: Gabby Diaz is a 50 y.o. female. RD referred for DM. RD spoke with patient for initial nutrition assessment on 1/30/23. RD called to follow up with patient today, 3/6/23. RD discussed previous goals with patient. Patient states she is doing okay, is currently sick with COVID-19. Patient reports that, overall, her blood sugars have improved since we last spoke. Patient states that her fasting blood sugars have been around 150 mg/dL. Patient continues to wear a CGM and monitor her blood sugars often throughout the day. Patient notes that she lost her blood glucose meter and would like a replacement. RD offered to reach out to PCP to have a new one sent to her Pharmacy, patient was agreeable. Patient reports that she has been eating consistently throughout the day; breakfast/lunch/dinner. See food recall below. Patient reports that she has been pairing protein with carbohydrates, with the exception of a handful of chips here and there. Patient continues to dine out several times weekly, but has been making an effort to choose healthier options. Patient has been snacking on greek yogurt, cheese/pretzels, or peanut butter toast with sugar-free raspberry preserves. Patient has been drinking sugar-free beverages, with the exception of skim milk. Patient's exercise consists of doing general housework and chores.      24-Hour Food Recall:   B - Soup   L -  Half of [employer] dip sandwich  D - Half of [employer] dip sandwich  Snack - Half of small milkshake from [employer]     Nutrition Monitoring and Evaluation  Indicator/Goal Criteria Progress   #1 Pair protein with carbohydrates  #1 I will have source of protein with source of carbohydrates #1 Patient generally pairs protein with carbohydrates      #2  Eat consistently throughout the day  #2 I will eat three meals per day or four to six small meals/snacks per day #2 Patient has been eating three meals/day    #3  Follow MyPlate guidelines #3  I will build balanced meals using the MyPlate reference: 1/2 plate fruits and/or vegetables, 1/4 plate protein, and 1/4 plate starchy carbohydrates with 8 oz glass of low fat milk if desired #3 Patient is not always building balanced meals      Plan of Care:  RD encouraged patient to keep working toward goals set. RD will follow up with patient to discuss any questions patient has and check the progress toward goals. Follow Up:    RD will call patient in four weeks to follow up and answer any nutrition related questions at that time.      Ella Dunn, [address] [place] [address],    [phone]

## 2023-03-06 NOTE — PROGRESS NOTES
3/6/2023    TELEHEALTH EVALUATION -- Audio/Visual (During FCIZP-47 public health emergency)    HPI:    Rasheed Coffey (:  1975) has requested an audio/video evaluation for the following concern(s):    Chief Complaint   Patient presents with    Positive For Covid-19    Cough    Chest Congestion     Runny nose, sneezing, chest tightness low grade fever, tested positive on Ziggy 3/05/23       Positive For Covid-19  This is a new problem. The current episode started in the past 7 days. The problem occurs constantly. The problem has been gradually worsening. Associated symptoms include anorexia, a change in bowel habit (diarrhea), chills, congestion, coughing, fatigue, a fever, headaches, myalgias, nausea, a sore throat and vomiting. Pertinent negatives include no abdominal pain, arthralgias, chest pain, diaphoresis, neck pain, numbness, rash, swollen glands, urinary symptoms, vertigo, visual change or weakness. Nothing aggravates the symptoms. She has tried relaxation, sleep and rest (cough syrup) for the symptoms. The treatment provided no relief. Review of Systems   Constitutional:  Positive for chills, fatigue and fever. Negative for diaphoresis. HENT:  Positive for congestion, ear pain, postnasal drip, sinus pressure, sneezing and sore throat. Respiratory:  Positive for cough, chest tightness and wheezing. Negative for shortness of breath. Asthma- using albuterol about every 4 hours. Cardiovascular:  Negative for chest pain, palpitations and leg swelling. Gastrointestinal:  Positive for anorexia, change in bowel habit (diarrhea), nausea and vomiting. Negative for abdominal pain, blood in stool and constipation. Endocrine:        DM- sugars have been running good. She is holding some medications due to not eating. No hypoglycemia   Genitourinary: Negative. Musculoskeletal:  Positive for myalgias. Negative for arthralgias and neck pain. Skin:  Negative for rash.    Neurological: Positive for headaches. Negative for vertigo, weakness and numbness. Prior to Visit Medications    Medication Sig Taking? Authorizing Provider   metFORMIN (GLUCOPHAGE-XR) 500 MG extended release tablet TAKE 2 TABLETS BY MOUTH IN THE MORNING AND AT BEDTIME Yes Lila Mathews DO   empagliflozin (JARDIANCE) 25 MG tablet Take 1 tablet by mouth daily Yes Princess Boss MD   labetalol (NORMODYNE) 200 MG tablet TAKE 1 TABLET BY MOUTH TWICE A DAY Yes Lila Mathews DO   [facility] D3 125 MCG (5000 UT) CAPS capsule TAKE 1 CAPSULE BY MOUTH EVERY DAY Yes Lila Mathews DO   Lancets ([address]) [address] [facility] 1 Lancet by Does not apply route 3 times daily as needed (glucose monitoring) Yes Michael Garcia MD   Lancet Devices (Mervin Fly PLUS LANCING) MISC 1 box by Does not apply route 3 times daily as needed (glocose monitoring) Yes Michael Garcia MD   omeprazole (PRILOSEC) 40 MG delayed release capsule TAKE 1 CAPSULE BY MOUTH EVERY DAY IN THE MORNING BEFORE BREAKFAST Yes Lila Mathews DO   ONE TOUCH ULTRASOFT LANCETS MISC 1 each by Does not apply route daily Yes Michael Garcia MD   atorvastatin (LIPITOR) 80 MG tablet TAKE 1 TABLET BY MOUTH EVERY DAY Yes Maurice Garza MD   aspirin 81 MG EC tablet TAKE 1 TABLET BY MOUTH EVERY DAY Yes Maurice Garza MD   albuterol sulfate (PROAIR RESPICLICK) 934 (90 Base) MCG/ACT aerosol powder inhalation Inhale 2 puffs into the lungs every 6 hours as needed for Wheezing or Shortness of Breath Yes BARB Dawn CNP   blood glucose test strips (ASCENSIA AUTODISC VI;ONE TOUCH ULTRA TEST VI) strip DX: E11.9 Brand approved by insurance. FSBS daily Yes BARB Dawn CNP   Lancets MISC 1 each by Does not apply route 2 times daily DX: E 11.9 FSBS daily. Brand approved by insurance.   BARB Dawn CNP       Social History     Tobacco Use    Smoking status: Never    Smokeless tobacco: Never   Vaping Use    Vaping Use: Never used Substance Use Topics    Alcohol use: Yes     Comment: 2 times per year    Drug use: No        Allergies   Allergen Reactions    Eggs Or Egg-Derived Products Nausea Only     Yolk. Takes flu shot yearly without problems. ,   Past Medical History:   Diagnosis Date    Chest pain 05/2021    + NM stress test    COVID-19 03/05/2023    DM (diabetes mellitus) ([facility] [address]     Hypertension    ,   Past Surgical History:   Procedure Laterality Date    CARDIAC CATHETERIZATION  05/14/2021    Non Obs CAD, medical management, <20% stenosis       PHYSICAL EXAMINATION:  Patient-Reported Vitals 3/6/2023   Patient-Reported Weight -   Patient-Reported Height -   Patient-Reported Systolic 053   Patient-Reported Diastolic 92         Physical Exam  Constitutional:       General: She is not in acute distress. Appearance: Normal appearance. She is normal weight. She is ill-appearing. She is not diaphoretic. HENT:      Head: Normocephalic and atraumatic. Right Ear: External ear normal.      Left Ear: External ear normal.      Nose: Nose normal. No rhinorrhea. Mouth/Throat:      Pharynx: Oropharynx is clear. Eyes:      General:         Right eye: No discharge. Left eye: No discharge. Conjunctiva/sclera: Conjunctivae normal.   Neck:      Trachea: Phonation normal.   Pulmonary:      Effort: Pulmonary effort is normal. No respiratory distress. Comments: Respirations easy and even, able to speak in complete sentences without shortness of breath or audible wheezing   Musculoskeletal:      Cervical back: Normal range of motion. Skin:     Coloration: Skin is not jaundiced or pale. Neurological:      General: No focal deficit present. Mental Status: She is alert and oriented to person, place, and time. Psychiatric:         Mood and Affect: Mood normal.         Behavior: Behavior normal.         Thought Content:  Thought content normal.         Judgment: Judgment normal.         ASSESSMENT/PLAN:    ICD-10-CM 1. COVID  U07.1 nirmatrelvir/ritonavir 300/100 (PAXLOVID) 20 x 150 MG & 10 x 100MG TBPK     benzonatate (TESSALON) 200 MG capsule     promethazine-dextromethorphan (PROMETHAZINE-DM) 6.25-15 MG/5ML syrup     DME Order for Pulse Ox as OP     fluticasone (FLONASE) 50 MCG/ACT nasal spray      2. Diabetes mellitus due to underlying condition with hyperosmolarity without coma, without long-term current use of insulin (HCC)  E08.00       3. Essential hypertension  I10         Start paxlovid  Hold atorvastatin  Discussed DM management- no changes at this time and continue to monitor  Htn- continue medications at current doses  Push fluids  Reviewed supportive care  Discussed quarantine guideline  S/s to report to ED reviewed      Orders Placed This Encounter   Medications    nirmatrelvir/ritonavir 300/100 (PAXLOVID) 20 x 150 MG & 10 x 100MG TBPK     Sig: Take 3 tablets (two 150 mg nirmatrelvir and one 100 mg ritonavir tablets) by mouth every 12 hours for 5 days. Hold atorvastatin     Dispense:  30 tablet     Refill:  0     Order Specific Question:   Does this patient qualify for COVID-19 antIviral therapy based on criteria for treatment?      Answer:   Yes    benzonatate (TESSALON) 200 MG capsule     Sig: Take 1 capsule by mouth 3 times daily as needed for Cough     Dispense:  30 capsule     Refill:  0    promethazine-dextromethorphan (PROMETHAZINE-DM) 6.25-15 MG/5ML syrup     Sig: Take 5 mLs by mouth 4 times daily as needed for Cough     Dispense:  240 mL     Refill:  0    fluticasone (FLONASE) 50 MCG/ACT nasal spray     Sig: One spray each nostril twice daily for 7 days then decrease to once daily     Dispense:  18 g     Refill:  0     Orders Placed This Encounter   Procedures    DME Order for Pulse Ox as OP     - Pulse Oximeter Device  - Frequency - 3 times per day  - Notify provider if oxygen saturation less than 90%  - Diagnosis - covid 19  - Length of need (months) - 3 Months         Return if symptoms worsen or fail to improve. Latonia Vincent is a 50 y.o. female  was evaluated through a synchronous (real-time) audio-video encounter. The patient (or guardian if applicable) is aware that this is a billable service, which includes applicable co-pays. This Virtual Visit was conducted with patient's (and/or legal guardian's) consent. The visit was conducted pursuant to the emergency declaration under the [address] [place] [address] and the [employer] General Act. Patient identification was verified, and a caregiver was present when appropriate. The patient was located in a state where the provider was licensed to provide care. Patient identification was verified at the start of the visit: Yes    Total time spent on this encounter: Not billed by time    Services were provided through a video synchronous discussion virtually to substitute for in-person clinic visit. Patient and provider were located at their individual homes. --BARB Lewis CNP on 3/6/2023 at 9:09 AM    An electronic signature was used to authenticate this note.

## 2023-03-21 ENCOUNTER — CARE COORDINATION (OUTPATIENT)
Dept: CARE COORDINATION | Age: 48
End: 2023-03-21

## 2023-03-21 DIAGNOSIS — E11.9 TYPE 2 DIABETES MELLITUS WITHOUT COMPLICATION, WITHOUT LONG-TERM CURRENT USE OF INSULIN (HCC): ICD-10-CM

## 2023-03-21 RX ORDER — BLOOD SUGAR DIAGNOSTIC
STRIP MISCELLANEOUS
Qty: 100 STRIP | Refills: 3 | Status: SHIPPED | OUTPATIENT
Start: 2023-03-21

## 2023-03-27 DIAGNOSIS — E11.9 TYPE 2 DIABETES MELLITUS WITHOUT COMPLICATION, WITHOUT LONG-TERM CURRENT USE OF INSULIN (HCC): ICD-10-CM

## 2023-03-28 DIAGNOSIS — U07.1 COVID: ICD-10-CM

## 2023-03-28 RX ORDER — METFORMIN HYDROCHLORIDE 500 MG/1
1000 TABLET, EXTENDED RELEASE ORAL 2 TIMES DAILY
Qty: 120 TABLET | Refills: 1 | Status: SHIPPED | OUTPATIENT
Start: 2023-03-28

## 2023-03-28 RX ORDER — FLUTICASONE PROPIONATE 50 MCG
SPRAY, SUSPENSION (ML) NASAL
Qty: 1 EACH | Refills: 2 | Status: SHIPPED | OUTPATIENT
Start: 2023-03-28

## 2023-04-01 DIAGNOSIS — I25.10 CORONARY ARTERY DISEASE, UNSPECIFIED VESSEL OR LESION TYPE, UNSPECIFIED WHETHER ANGINA PRESENT, UNSPECIFIED WHETHER NATIVE OR TRANSPLANTED HEART: ICD-10-CM

## 2023-04-03 RX ORDER — ASPIRIN 81 MG/1
TABLET, COATED ORAL
Qty: 90 TABLET | Refills: 2 | Status: SHIPPED | OUTPATIENT
Start: 2023-04-03

## 2023-04-20 ENCOUNTER — CARE COORDINATION (OUTPATIENT)
Dept: CARE COORDINATION | Age: 48
End: 2023-04-20

## 2023-04-20 NOTE — CARE COORDINATION
Contacted Boni Amaya and left voicemail regarding Dietitian follow up. Left call back number and will follow up as appropriate.          Brittni Darling, [address] [place] [address], LD  [phone]

## 2023-04-23 DIAGNOSIS — E11.9 TYPE 2 DIABETES MELLITUS WITHOUT COMPLICATION, WITHOUT LONG-TERM CURRENT USE OF INSULIN (HCC): ICD-10-CM

## 2023-04-25 RX ORDER — METFORMIN HYDROCHLORIDE 500 MG/1
1000 TABLET, EXTENDED RELEASE ORAL 2 TIMES DAILY
Qty: 120 TABLET | Refills: 1 | Status: SHIPPED | OUTPATIENT
Start: 2023-04-25

## 2023-04-26 ENCOUNTER — CARE COORDINATION (OUTPATIENT)
Dept: CARE COORDINATION | Age: 48
End: 2023-04-26

## 2023-04-26 NOTE — CARE COORDINATION
Charity Lim  4/26/2023    Registered Dietitian Progress Note for Care Coordination    Assessment: Analisa Davalos is a 50 y.o. female. RD referred for DM. RD spoke with patient for initial nutrition assessment on 1/30/23. RD called to follow up with patient today, 4/26/23. RD discussed previous goals with patient. Patient states her blood sugars have been \"okay, a little better. \" Patient states that her fasting blood sugar has ranged 150-180 mg/dL lately. Patient reports a fasting blood sugar of 150 mg/dL this morning at 0700. Patient expresses frustration with blood sugars being higher than she would prefer. Patient states she is not always taking four tablets of Metformin daily, however. RD reiterated importance of taking Metformin as prescribed and to take with food to help mitigate side effects. Patient states that the Metformin makes her sick at times and that she normally takes it with a meal. RD encouraged patient to reach out to Endocrinologist to see if there is a different option for her. Patient verbalized understanding and states she has an appointment next month. Patient is eating consistently throughout the day, see food recall below. Patient is also pairing protein with carbohydrates for meals and snacks. Patient is eating one to two balanced meals per day. Patient's beverages are all sugar-free, with the exception of skim or 1% milk. Patient does not engage in regular, purposeful physical activity, but stays active by running errands and doing housework.       24-Hour Food Recall:   Breakfast - One piece whole wheat toast, sugar-free/natural peanut butter, sugar-free strawberry preserves   Lunch - One slice of pizza at [address] chicken, grape tomatoes, small baked potato   Snacks - \"Couple\" bites of sub sandwich, laith slush from [id number]  [address] - 1% milk, water     Nutrition Monitoring and Evaluation  Indicator/Goal Criteria Progress   #1 Pair protein with carbohydrates  #1 I will

## 2023-05-12 ENCOUNTER — CARE COORDINATION (OUTPATIENT)
Dept: CARE COORDINATION | Age: 48
End: 2023-05-12

## 2023-05-12 NOTE — CARE COORDINATION
Attempted to contact patient for CC f/u call; left HIPPA compliant message and ACM contact information.     Confirmed CC dietician has a follow up call scheduled later this month

## 2023-05-15 DIAGNOSIS — E11.69 DYSLIPIDEMIA ASSOCIATED WITH TYPE 2 DIABETES MELLITUS (HCC): ICD-10-CM

## 2023-05-15 DIAGNOSIS — E78.5 DYSLIPIDEMIA ASSOCIATED WITH TYPE 2 DIABETES MELLITUS (HCC): ICD-10-CM

## 2023-05-15 DIAGNOSIS — E11.65 TYPE 2 DIABETES MELLITUS WITH HYPERGLYCEMIA, WITHOUT LONG-TERM CURRENT USE OF INSULIN (HCC): ICD-10-CM

## 2023-05-15 DIAGNOSIS — I10 ESSENTIAL HYPERTENSION: ICD-10-CM

## 2023-05-15 LAB
CHOLEST SERPL-MCNC: 125 MG/DL (ref 0–199)
HDLC SERPL-MCNC: 46 MG/DL (ref 40–60)
LDL CHOLESTEROL CALCULATED: 63 MG/DL
TRIGL SERPL-MCNC: 80 MG/DL (ref 0–150)
TSH SERPL DL<=0.005 MIU/L-ACNC: 1.84 UIU/ML (ref 0.27–4.2)
VLDLC SERPL CALC-MCNC: 16 MG/DL

## 2023-05-16 LAB
EST. AVERAGE GLUCOSE BLD GHB EST-MCNC: 137 MG/DL
HBA1C MFR BLD: 6.4 %

## 2023-05-23 ENCOUNTER — OFFICE VISIT (OUTPATIENT)
Dept: ENDOCRINOLOGY | Age: 48
End: 2023-05-23
Payer: COMMERCIAL

## 2023-05-23 VITALS
HEART RATE: 77 BPM | OXYGEN SATURATION: 98 % | DIASTOLIC BLOOD PRESSURE: 75 MMHG | BODY MASS INDEX: 25.52 KG/M2 | HEIGHT: 63 IN | SYSTOLIC BLOOD PRESSURE: 115 MMHG | WEIGHT: 144 LBS

## 2023-05-23 DIAGNOSIS — E11.9 TYPE 2 DIABETES MELLITUS WITHOUT COMPLICATION, WITHOUT LONG-TERM CURRENT USE OF INSULIN (HCC): ICD-10-CM

## 2023-05-23 DIAGNOSIS — E11.65 TYPE 2 DIABETES MELLITUS WITH HYPERGLYCEMIA, WITHOUT LONG-TERM CURRENT USE OF INSULIN (HCC): Primary | ICD-10-CM

## 2023-05-23 DIAGNOSIS — E11.69 DYSLIPIDEMIA ASSOCIATED WITH TYPE 2 DIABETES MELLITUS (HCC): ICD-10-CM

## 2023-05-23 DIAGNOSIS — I10 ESSENTIAL HYPERTENSION: ICD-10-CM

## 2023-05-23 DIAGNOSIS — E78.5 DYSLIPIDEMIA ASSOCIATED WITH TYPE 2 DIABETES MELLITUS (HCC): ICD-10-CM

## 2023-05-23 PROCEDURE — G8419 CALC BMI OUT NRM PARAM NOF/U: HCPCS | Performed by: INTERNAL MEDICINE

## 2023-05-23 PROCEDURE — 1036F TOBACCO NON-USER: CPT | Performed by: INTERNAL MEDICINE

## 2023-05-23 PROCEDURE — 3074F SYST BP LT 130 MM HG: CPT | Performed by: INTERNAL MEDICINE

## 2023-05-23 PROCEDURE — G8427 DOCREV CUR MEDS BY ELIG CLIN: HCPCS | Performed by: INTERNAL MEDICINE

## 2023-05-23 PROCEDURE — 99214 OFFICE O/P EST MOD 30 MIN: CPT | Performed by: INTERNAL MEDICINE

## 2023-05-23 PROCEDURE — 95251 CONT GLUC MNTR ANALYSIS I&R: CPT | Performed by: INTERNAL MEDICINE

## 2023-05-23 PROCEDURE — 3044F HG A1C LEVEL LT 7.0%: CPT | Performed by: INTERNAL MEDICINE

## 2023-05-23 PROCEDURE — 3078F DIAST BP <80 MM HG: CPT | Performed by: INTERNAL MEDICINE

## 2023-05-23 PROCEDURE — 2022F DILAT RTA XM EVC RTNOPTHY: CPT | Performed by: INTERNAL MEDICINE

## 2023-05-23 RX ORDER — METFORMIN HYDROCHLORIDE 500 MG/1
1000 TABLET, EXTENDED RELEASE ORAL 2 TIMES DAILY
Qty: 120 TABLET | Refills: 1 | Status: SHIPPED | OUTPATIENT
Start: 2023-05-23

## 2023-05-23 RX ORDER — BLOOD-GLUCOSE SENSOR
EACH MISCELLANEOUS
COMMUNITY
Start: 2023-05-19

## 2023-05-23 NOTE — PROGRESS NOTES
right- negative, left- negative  Sensory exam: Monofilament sensation: normal  Pulses: normal, Vibration (128 Hz): Intact    Renal screen: Dec 2022  TSH screen: May 2023     2: HTN   Controlled     3: Hyperlipidemia   LDL: 63 <  111 , HDL: 46, TGs: 80 - May 2023   C/w atorvastatin 80 mg daily    If LDL > 100 add zetia     RTC in 4-6 months with A1C     EDUCATION:   Greater than 50% of this visit was spent in general counseling regarding obesity, diet, exercise, importance of adherence to insulin regime, recognition and treatment of hypo and hyperglycemia,  glucose logging, proper diabetes management, diabetic complications with poor management and the importance of glycemic control in order to avoid the complications of diabetes. Risks and potential complications of diabetes were reviewed with the patient. Diabetes health maintenance plan and follow-up were discussed and understood by the patient. We reviewed the importance of medication compliance and regular follow-up. Aggressive lifestyle modification was encouraged. Exercise Counselling: This patient is a candidate for regular physical exercise. Instructions to perform the following types of exercise:  Swimming or water aerobic exercise  Brisk walking  Playing tennis  Stationary bicycle or elliptical indoor  Low impact aerobic exercise    Instructions given to exercise for the following duration:  30 minutes a day for five-seven days per week.     Following instructions for being active throughout the day in addition to formal exercise:  Walk instead of drive whenever possible  Take the stairs instead of the elevator  Work in the garden  Park to the far end of the parking lot to add more walking steps to destination      Electronically signed by John De Luna MD on 5/23/2023 at 8:59 AM

## 2023-05-23 NOTE — TELEPHONE ENCOUNTER
8/26/2022    Future Appointments   Date Time Provider Yudith Bailey   10/24/2023 10:20 AM Harmeet Johnson MD [facility]

## 2023-05-26 ENCOUNTER — CARE COORDINATION (OUTPATIENT)
Dept: CARE COORDINATION | Age: 48
End: 2023-05-26

## 2023-05-26 NOTE — CARE COORDINATION
Contacted Cristine Cowden and left voicemail regarding Dietitian follow up. Left call back number and will follow up as appropriate.        Juliette Abraham, [address] [place] [address],   [phone]

## 2023-06-02 ENCOUNTER — CARE COORDINATION (OUTPATIENT)
Dept: CARE COORDINATION | Age: 48
End: 2023-06-02

## 2023-06-02 NOTE — CARE COORDINATION
Contacted Chato Ramey and left voicemail regarding Dietitian follow up. Left call back number and will follow up as appropriate.        Tomasz Palmer, [address] [place] [address],   [phone]

## 2023-06-23 ENCOUNTER — CARE COORDINATION (OUTPATIENT)
Dept: CARE COORDINATION | Age: 48
End: 2023-06-23

## 2023-07-03 ENCOUNTER — CARE COORDINATION (OUTPATIENT)
Dept: CARE COORDINATION | Age: 48
End: 2023-07-03

## 2023-07-03 NOTE — CARE COORDINATION
Noted CC RD and patient have concluded nutritional counseling  Multiple attempts to contact patient by this ACM unsuccessful  No further outreach scheduled with this ACM; episode of care resolved

## 2023-07-06 NOTE — TELEPHONE ENCOUNTER
Future Appointments   Date Time Provider [facility] [address]   10/24/2023 10:20 AM Alexandra Nicolas MD [facility]     Albertine Fail 8/26/2022

## 2023-07-07 RX ORDER — BLOOD-GLUCOSE SENSOR
EACH MISCELLANEOUS
Qty: 1 EACH | Refills: 5 | Status: SHIPPED | OUTPATIENT
Start: 2023-07-07

## 2023-07-14 DIAGNOSIS — E11.9 TYPE 2 DIABETES MELLITUS WITHOUT COMPLICATION, WITHOUT LONG-TERM CURRENT USE OF INSULIN (HCC): ICD-10-CM

## 2023-07-14 RX ORDER — METFORMIN HYDROCHLORIDE 500 MG/1
1000 TABLET, EXTENDED RELEASE ORAL 2 TIMES DAILY
Qty: 120 TABLET | Refills: 1 | Status: SHIPPED | OUTPATIENT
Start: 2023-07-14

## 2023-07-26 ENCOUNTER — TELEPHONE (OUTPATIENT)
Dept: FAMILY MEDICINE CLINIC | Age: 48
End: 2023-07-26

## 2023-07-26 ENCOUNTER — CARE COORDINATION (OUTPATIENT)
Dept: CARE COORDINATION | Age: 48
End: 2023-07-26

## 2023-07-26 RX ORDER — ONDANSETRON 4 MG/1
4 TABLET, FILM COATED ORAL DAILY PRN
Qty: 5 TABLET | Refills: 0 | Status: SHIPPED | OUTPATIENT
Start: 2023-07-26

## 2023-07-26 RX ORDER — BLOOD-GLUCOSE SENSOR
EACH MISCELLANEOUS
Qty: 1 EACH | Refills: 5 | Status: SHIPPED | OUTPATIENT
Start: 2023-07-26

## 2023-07-26 RX ORDER — SCOLOPAMINE TRANSDERMAL SYSTEM 1 MG/1
1 PATCH, EXTENDED RELEASE TRANSDERMAL
Qty: 2 PATCH | Refills: 0 | Status: SHIPPED | OUTPATIENT
Start: 2023-07-26

## 2023-07-26 NOTE — TELEPHONE ENCOUNTER
Patient called for 2 things   She is going on 5 day cruise [place] for her cruise this Friday. She is requesting something for motion sickness and anti nausea medication as well. PA will need to be done for [facility] because too soon to fill but will run out it while on the cruise.      3/6/2023 last ov  Future Appointments   Date Time Provider [address] [place] [address]   10/24/2023 10:20 AM Saloni Gonzales MD [facility]       [facility]

## 2023-07-26 NOTE — CARE COORDINATION
Patient contacted this ACM requesting PRN medication for motion sickness and nausea  Patient is going on a cruise and reports she has a history motion sickness on boats  Patient also requesting a PA for [facility] 30 day supply  Patient reports she has her glucometer if needed  This ACM contacted PCP office for three way call with patient and Nabil Jimenez regarding request  Nabil Jimenez spoke with patient and will send request to PCP  Patient denies additional CC needs  No further outreach scheduled with this ACM; episode of care resolved

## 2023-08-06 DIAGNOSIS — E11.9 TYPE 2 DIABETES MELLITUS WITHOUT COMPLICATION, WITHOUT LONG-TERM CURRENT USE OF INSULIN (HCC): ICD-10-CM

## 2023-08-07 NOTE — TELEPHONE ENCOUNTER
8/26/2022          Future Appointments   Date Time Provider [address]   10/24/2023 10:20 AM Yazmin Abbasi MD [facility]

## 2023-08-08 RX ORDER — METFORMIN HYDROCHLORIDE 500 MG/1
1000 TABLET, EXTENDED RELEASE ORAL 2 TIMES DAILY
Qty: 120 TABLET | Refills: 1 | Status: SHIPPED | OUTPATIENT
Start: 2023-08-08

## 2023-08-13 DIAGNOSIS — E11.69 DYSLIPIDEMIA ASSOCIATED WITH TYPE 2 DIABETES MELLITUS (HCC): ICD-10-CM

## 2023-08-13 DIAGNOSIS — E11.65 TYPE 2 DIABETES MELLITUS WITH HYPERGLYCEMIA, WITHOUT LONG-TERM CURRENT USE OF INSULIN (HCC): ICD-10-CM

## 2023-08-13 DIAGNOSIS — E55.9 VITAMIN D DEFICIENCY: ICD-10-CM

## 2023-08-13 DIAGNOSIS — I10 ESSENTIAL HYPERTENSION: ICD-10-CM

## 2023-08-13 DIAGNOSIS — E78.5 DYSLIPIDEMIA ASSOCIATED WITH TYPE 2 DIABETES MELLITUS (HCC): ICD-10-CM

## 2023-08-14 RX ORDER — EMPAGLIFLOZIN 25 MG/1
TABLET, FILM COATED ORAL
Qty: 90 TABLET | Refills: 1 | Status: SHIPPED | OUTPATIENT
Start: 2023-08-14

## 2023-08-14 NOTE — TELEPHONE ENCOUNTER
8/26/2022    Future Appointments   Date Time Provider [address]   10/24/2023 10:20 AM Thuan Garcia MD [facility]

## 2023-08-14 NOTE — TELEPHONE ENCOUNTER
Requested Refill:   Requested Prescriptions     Pending Prescriptions Disp Refills    JARDIANCE 25 MG tablet [Pharmacy Med Name: Roxmaykel Lingle 25 MG TABLET] 90 tablet 1     Sig: TAKE 1 TABLET BY MOUTH EVERY DAY     Last refilled:  2/8/2023 # 90 with 1 refill     Last Appt: 5/23/2023  Next Appt: 10/24/2023

## 2023-08-15 RX ORDER — CHOLECALCIFEROL (VITAMIN D3) 125 MCG
CAPSULE ORAL
Qty: 90 CAPSULE | Refills: 1 | Status: SHIPPED | OUTPATIENT
Start: 2023-08-15

## 2023-08-15 RX ORDER — OMEPRAZOLE 40 MG/1
CAPSULE, DELAYED RELEASE ORAL
Qty: 90 CAPSULE | Refills: 1 | Status: SHIPPED | OUTPATIENT
Start: 2023-08-15

## 2023-08-29 DIAGNOSIS — E78.00 PURE HYPERCHOLESTEROLEMIA: ICD-10-CM

## 2023-08-29 DIAGNOSIS — U07.1 COVID: ICD-10-CM

## 2023-08-29 RX ORDER — FLUTICASONE PROPIONATE 50 MCG
SPRAY, SUSPENSION (ML) NASAL
Qty: 1 EACH | Refills: 5 | Status: SHIPPED | OUTPATIENT
Start: 2023-08-29

## 2023-08-29 NOTE — TELEPHONE ENCOUNTER
Future Appointments   Date Time Provider [address]   10/24/2023 10:20 AM José Miguel Felipe MD [facility]     César Estrada 8/26/2022

## 2023-08-30 NOTE — TELEPHONE ENCOUNTER
8/30/2022 [facility]  5/15/2023 Lipid    Please contact pt for yearly appt. / med refill appt

## 2023-08-30 NOTE — TELEPHONE ENCOUNTER
8/30 Called [phone].  [facility] for pt to call and schedule appt with [facility] for medication refill

## 2023-08-31 RX ORDER — ATORVASTATIN CALCIUM 80 MG/1
TABLET, FILM COATED ORAL
Qty: 90 TABLET | Refills: 0 | Status: SHIPPED | OUTPATIENT
Start: 2023-08-31

## 2023-08-31 NOTE — TELEPHONE ENCOUNTER
Spoke to Pt.   Scheduled to see [facility] in [place] on 10/20/23 at 8am.  Please send medication refill

## 2023-09-05 DIAGNOSIS — E11.9 TYPE 2 DIABETES MELLITUS WITHOUT COMPLICATION, WITHOUT LONG-TERM CURRENT USE OF INSULIN (HCC): ICD-10-CM

## 2023-09-05 RX ORDER — METFORMIN HYDROCHLORIDE 500 MG/1
1000 TABLET, EXTENDED RELEASE ORAL 2 TIMES DAILY
Qty: 120 TABLET | Refills: 1 | Status: SHIPPED | OUTPATIENT
Start: 2023-09-05

## 2023-09-05 NOTE — TELEPHONE ENCOUNTER
8/26/2022    Future Appointments   Date Time Provider [address]   10/20/2023  8:00 AM Camden Crawford MD [facility]   10/24/2023 10:20 AM Robert Rolle MD [facility]

## 2023-10-19 NOTE — PROGRESS NOTES
[address] [facility]   Cardiac Follow Up     Referring Provider:  Da Jones DO     Chief Complaint   Patient presents with    Follow-up    Hypertension    Hyperlipidemia    Chest Pain     Occasional cp    Shortness of Breath     Has SOB more than she would like. Pt has asthma. Edema     Right hand. Isaiah Ricardo   1975     History of Present Illness:    Isaiah Ricardo is a 46 y.o. female who is here today for follow up for a past medical history of asthma, hypertension, hyperlipidemia, diabetes mellitus. She had a stress test on 5/7/2021 which could not exclude small to moderate area of anterior ischemia so she underwent a left heart cath on 5/14/2021 which showed mild nonobstructive coronary artery disease. An echocardiogram from 5/7/2021 showed an EF of 55% with mild mitral regurgitation. She wore a GEOVANNY in 4/2021 which showed rare PAC's and PVC's. At her last visit, she reported having pain at her right radial cath site. 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 She was evaluated by vascular surgery who felt symptoms were more related to nerve inflammation than ischemia. At her last visit was discussed starting Lipitor but she wanted to discuss this her OBGYN prior to starting. Today she states she has been feeling well since her last visit. She occasionally has chest pains that is unchanged since her last visit. Pain comes in randomly, resolves with rest. She has right hand swelling and some weakness since her radial artery occlusion. She is tolerating her medications and is taking them as prescribed. Her blood pressure has been well controlled. She states she has tingling in her feet at times which is recently worse. She has diabetes and states her blood sugar varies throughout the day. She stopped taking Patient currently denies any weight gain, edema, palpitations, shortness of breath, dizziness, and syncope.              Past Medical History:   has a past medical

## 2023-10-20 ENCOUNTER — OFFICE VISIT (OUTPATIENT)
Dept: CARDIOLOGY CLINIC | Age: 48
End: 2023-10-20
Payer: COMMERCIAL

## 2023-10-20 VITALS
OXYGEN SATURATION: 98 % | BODY MASS INDEX: 24.13 KG/M2 | SYSTOLIC BLOOD PRESSURE: 124 MMHG | HEART RATE: 86 BPM | DIASTOLIC BLOOD PRESSURE: 70 MMHG | WEIGHT: 136.2 LBS | HEIGHT: 63 IN

## 2023-10-20 DIAGNOSIS — I10 ESSENTIAL HYPERTENSION: ICD-10-CM

## 2023-10-20 DIAGNOSIS — R06.02 SOB (SHORTNESS OF BREATH): ICD-10-CM

## 2023-10-20 DIAGNOSIS — E78.00 PURE HYPERCHOLESTEROLEMIA: ICD-10-CM

## 2023-10-20 DIAGNOSIS — R07.9 CHEST PAIN, UNSPECIFIED TYPE: Primary | ICD-10-CM

## 2023-10-20 DIAGNOSIS — I25.10 CORONARY ARTERY DISEASE INVOLVING NATIVE CORONARY ARTERY OF NATIVE HEART WITHOUT ANGINA PECTORIS: ICD-10-CM

## 2023-10-20 PROCEDURE — 99214 OFFICE O/P EST MOD 30 MIN: CPT | Performed by: INTERNAL MEDICINE

## 2023-10-20 PROCEDURE — G8420 CALC BMI NORM PARAMETERS: HCPCS | Performed by: INTERNAL MEDICINE

## 2023-10-20 PROCEDURE — 93000 ELECTROCARDIOGRAM COMPLETE: CPT | Performed by: INTERNAL MEDICINE

## 2023-10-20 PROCEDURE — 1036F TOBACCO NON-USER: CPT | Performed by: INTERNAL MEDICINE

## 2023-10-20 PROCEDURE — 3078F DIAST BP <80 MM HG: CPT | Performed by: INTERNAL MEDICINE

## 2023-10-20 PROCEDURE — 3017F COLORECTAL CA SCREEN DOC REV: CPT | Performed by: INTERNAL MEDICINE

## 2023-10-20 PROCEDURE — G8484 FLU IMMUNIZE NO ADMIN: HCPCS | Performed by: INTERNAL MEDICINE

## 2023-10-20 PROCEDURE — 3074F SYST BP LT 130 MM HG: CPT | Performed by: INTERNAL MEDICINE

## 2023-10-20 PROCEDURE — G8427 DOCREV CUR MEDS BY ELIG CLIN: HCPCS | Performed by: INTERNAL MEDICINE

## 2023-10-20 RX ORDER — ATORVASTATIN CALCIUM 80 MG/1
80 TABLET, FILM COATED ORAL DAILY
Qty: 90 TABLET | Refills: 3 | Status: SHIPPED | OUTPATIENT
Start: 2023-10-20

## 2023-10-20 RX ORDER — LABETALOL 200 MG/1
200 TABLET, FILM COATED ORAL 2 TIMES DAILY
Qty: 180 TABLET | Refills: 3 | Status: SHIPPED | OUTPATIENT
Start: 2023-10-20

## 2023-10-20 NOTE — PATIENT INSTRUCTIONS
Plan:  Cardiac medications reviewed including indications and pertinent side effects. Medication list updated at this visit.    Check blood pressure and heart rate at home a few times per week- keep a log with dates and times and bring to office visit   Regular exercise and following a healthy diet encouraged   Follow up with me in 1 year   Medication refills have been provided for one year to your preferred pharmacy

## 2023-10-24 ENCOUNTER — OFFICE VISIT (OUTPATIENT)
Dept: ENDOCRINOLOGY | Age: 48
End: 2023-10-24
Payer: COMMERCIAL

## 2023-10-24 VITALS
SYSTOLIC BLOOD PRESSURE: 123 MMHG | BODY MASS INDEX: 23.85 KG/M2 | HEIGHT: 63 IN | OXYGEN SATURATION: 99 % | WEIGHT: 134.6 LBS | DIASTOLIC BLOOD PRESSURE: 76 MMHG | HEART RATE: 74 BPM

## 2023-10-24 DIAGNOSIS — I10 ESSENTIAL HYPERTENSION: ICD-10-CM

## 2023-10-24 DIAGNOSIS — E11.69 DYSLIPIDEMIA ASSOCIATED WITH TYPE 2 DIABETES MELLITUS (HCC): ICD-10-CM

## 2023-10-24 DIAGNOSIS — E11.9 TYPE 2 DIABETES MELLITUS WITHOUT COMPLICATION, WITHOUT LONG-TERM CURRENT USE OF INSULIN (HCC): ICD-10-CM

## 2023-10-24 DIAGNOSIS — E11.65 TYPE 2 DIABETES MELLITUS WITH HYPERGLYCEMIA, WITHOUT LONG-TERM CURRENT USE OF INSULIN (HCC): Primary | ICD-10-CM

## 2023-10-24 DIAGNOSIS — E78.5 DYSLIPIDEMIA ASSOCIATED WITH TYPE 2 DIABETES MELLITUS (HCC): ICD-10-CM

## 2023-10-24 PROCEDURE — G8484 FLU IMMUNIZE NO ADMIN: HCPCS | Performed by: INTERNAL MEDICINE

## 2023-10-24 PROCEDURE — 3044F HG A1C LEVEL LT 7.0%: CPT | Performed by: INTERNAL MEDICINE

## 2023-10-24 PROCEDURE — G8420 CALC BMI NORM PARAMETERS: HCPCS | Performed by: INTERNAL MEDICINE

## 2023-10-24 PROCEDURE — 2022F DILAT RTA XM EVC RTNOPTHY: CPT | Performed by: INTERNAL MEDICINE

## 2023-10-24 PROCEDURE — 3078F DIAST BP <80 MM HG: CPT | Performed by: INTERNAL MEDICINE

## 2023-10-24 PROCEDURE — 3074F SYST BP LT 130 MM HG: CPT | Performed by: INTERNAL MEDICINE

## 2023-10-24 PROCEDURE — 99214 OFFICE O/P EST MOD 30 MIN: CPT | Performed by: INTERNAL MEDICINE

## 2023-10-24 PROCEDURE — 3017F COLORECTAL CA SCREEN DOC REV: CPT | Performed by: INTERNAL MEDICINE

## 2023-10-24 PROCEDURE — G8427 DOCREV CUR MEDS BY ELIG CLIN: HCPCS | Performed by: INTERNAL MEDICINE

## 2023-10-24 PROCEDURE — 1036F TOBACCO NON-USER: CPT | Performed by: INTERNAL MEDICINE

## 2023-10-24 RX ORDER — METFORMIN HYDROCHLORIDE 500 MG/1
1000 TABLET, EXTENDED RELEASE ORAL 2 TIMES DAILY
Qty: 120 TABLET | Refills: 5 | Status: SHIPPED | OUTPATIENT
Start: 2023-10-24

## 2023-10-24 NOTE — PROGRESS NOTES
Review of Systems:    Constitutional: Negative for fever, chills  HENT: Negative for congestion, ear pain, rhinorrhea,  sore throat and trouble swallowing. Eyes: Negative for photophobia, redness, itching. Respiratory: Negative for cough, shortness of breath and sputum. Cardiovascular: Negative for chest pain, palpitations and leg swelling. Gastrointestinal: Negative for nausea, vomiting, abdominal pain, diarrhea, constipation. Endocrine: Negative for cold intolerance, heat intolerance, polydipsia, polyphagia and polyuria. Genitourinary: Negative for dysuria, urgency, frequency, hematuria and flank pain. Musculoskeletal: Negative for myalgias, back pain, arthralgias and neck pain. Skin/Nail: Negative for rash, itching. Normal nails. Neurological: Negative for seizures, weakness, light-headedness, numbness and headaches. Hematological/ Lymph nodes: Negative for adenopathy. Does not bruise/bleed easily. Psychiatric/Behavioral: Negative for suicidal ideas, depression, anxiety, sleep disturbance and decreased concentration. Objective:   Physical Exam:  There were no vitals taken for this visit. Constitutional: Patient is oriented to person, place, and time. Patient appears well-developed and well-nourished. HENT:              Head: Normocephalic and atraumatic. Eyes: Conjunctivae and EOM are normal.               Neck: Normal range of motion. Cardiovascular: Normal rate, regular rhythm and normal heart sounds. Pulmonary/Chest: Effort normal and breath sounds normal.    Musculoskeletal: Normal range of motion. Neurological: Patient is alert and oriented to person, place, and time. Skin: Skin is warm and dry. Psychiatric: Patient has a normal mood and affect.  Patient behavior is normal.     Lab Review:    Orders Only on 05/15/2023   Component Date Value Ref Range Status    TSH Reflex FT4 05/15/2023 1.84  0.27 - 4.20 uIU/mL Final    Cholesterol, Fasting

## 2024-01-14 DIAGNOSIS — I10 ESSENTIAL HYPERTENSION: ICD-10-CM

## 2024-01-14 DIAGNOSIS — E55.9 VITAMIN D DEFICIENCY: ICD-10-CM

## 2024-01-14 DIAGNOSIS — E11.69 DYSLIPIDEMIA ASSOCIATED WITH TYPE 2 DIABETES MELLITUS (HCC): ICD-10-CM

## 2024-01-14 DIAGNOSIS — E11.65 TYPE 2 DIABETES MELLITUS WITH HYPERGLYCEMIA, WITHOUT LONG-TERM CURRENT USE OF INSULIN (HCC): ICD-10-CM

## 2024-01-14 DIAGNOSIS — E78.5 DYSLIPIDEMIA ASSOCIATED WITH TYPE 2 DIABETES MELLITUS (HCC): ICD-10-CM

## 2024-01-14 NOTE — TELEPHONE ENCOUNTER
Refill Request     CONFIRM preferred pharmacy with the patient.    If Mail Order Rx - Pend for 90 day refill.      Last Seen: Last Seen Department: Visit date not found  Last Seen by PCP: 8/26/2022    Last Written: 8/15/23 90 tabs 1 refill     If no future appointment scheduled:  Review the last OV with PCP and review information for follow-up visit,  Route STAFF MESSAGE with patient name to the  Pool for scheduling with the following information:            -  Timing of next visit           -  Visit type ie Physical, OV, etc           -  Diagnoses/Reason ie. COPD, HTN - Do not use MEDICATION, Follow-up or CHECK UP - Give reason for visit      Next Appointment:   Future Appointments   Date Time Provider Department Center   3/22/2024 10:20 AM Emani Hurst MD [facility]       Message sent to  to schedule appt with patient?  Yes      Requested Prescriptions     Pending Prescriptions Disp Refills    Cholecalciferol (VITAMIN D3) 125 MCG (5000 UT) CAPS [Pharmacy Med Name: VITAMIN D3 5,000 UNIT SOFTGEL] 90 capsule 1     Sig: TAKE 1 CAPSULE BY MOUTH EVERY DAY

## 2024-01-15 RX ORDER — EMPAGLIFLOZIN 25 MG/1
TABLET, FILM COATED ORAL
Qty: 90 TABLET | Refills: 1 | Status: SHIPPED | OUTPATIENT
Start: 2024-01-15

## 2024-01-23 ENCOUNTER — HOSPITAL ENCOUNTER (OUTPATIENT)
Dept: MAMMOGRAPHY | Age: 49
Discharge: HOME | End: 2024-01-23
Payer: COMMERCIAL

## 2024-01-23 DIAGNOSIS — Z12.31 ENCOUNTER FOR SCREENING MAMMOGRAM FOR BREAST CANCER: ICD-10-CM

## 2024-01-23 PROCEDURE — 77063 BREAST TOMOSYNTHESIS BI: CPT

## 2024-01-29 NOTE — TELEPHONE ENCOUNTER
Future Appointments   Date Time Provider Department Center   3/22/2024 10:20 AM Emani Hurst MD [facility] 8/26/2022

## 2024-01-30 RX ORDER — OMEPRAZOLE 40 MG/1
CAPSULE, DELAYED RELEASE ORAL
Qty: 90 CAPSULE | Refills: 1 | Status: SHIPPED | OUTPATIENT
Start: 2024-01-30

## 2024-02-26 DIAGNOSIS — U07.1 COVID: ICD-10-CM

## 2024-02-26 NOTE — TELEPHONE ENCOUNTER
Last ov 03/06/2023   Future Appointments   Date Time Provider Department Center   3/22/2024 10:20 AM Emani Hurst MD [facility]

## 2024-02-27 RX ORDER — FLUTICASONE PROPIONATE 50 MCG
SPRAY, SUSPENSION (ML) NASAL
Qty: 1 EACH | Refills: 5 | Status: SHIPPED | OUTPATIENT
Start: 2024-02-27

## 2024-03-08 RX ORDER — BLOOD-GLUCOSE SENSOR
EACH MISCELLANEOUS
Qty: 1 EACH | Refills: 5 | Status: SHIPPED | OUTPATIENT
Start: 2024-03-08

## 2024-03-08 NOTE — TELEPHONE ENCOUNTER
Future Appointments   Date Time Provider Department Center   3/22/2024 10:20 AM Emani Hurst MD [facility] 8/26/2022

## 2024-04-24 NOTE — PROGRESS NOTES
2024    Reina Stockton (:  1975) is a 48 y.o. female, here for a preventive medicine evaluation.  Chief Complaint   Patient presents with    Annual Exam     Not fasting today        Diagnosis Orders   1. Annual physical exam  CBC    Comprehensive Metabolic Panel    Lipid Panel    TSH with Reflex to FT4    Vitamin D 25 Hydroxy    Hemoglobin A1C      2. Pain in left leg  [facility]      3. Lump in armpit area, right  [facility] - Savannah Baker MD, Breast Surgery, [facility]      4. Psoriasis  calcipotriene (DOVONEX) 0.005 % cream    External Referral To Dermatology        Reina was seen today for annual exam.    Diagnoses and all orders for this visit:    Annual physical exam  -     CBC; Future  -     Comprehensive Metabolic Panel; Future  -     Lipid Panel; Future  -     TSH with Reflex to FT4; Future  -     Vitamin D 25 Hydroxy; Future  -     Hemoglobin A1C; Future    Pain in left leg, mostly lateral, could be related to tensor fascia kylie or bursitis, will trial pt, if no better: ortho  -     [facility]    Lump in armpit area, right  Mammograms benign  -     Savannah Hilario MD, Breast Surgery, [facility]    Psoriasis, hx of, may be eczema,   -     calcipotriene (DOVONEX) 0.005 % cream; Apply topically 2 times daily.  -     External Referral To Dermatology      Follows with endo for DM    Has burining in feet, possible neuropathy from DM, cont to follow with endo, and can try otc volataren gel.  Hemoglobin A1C   Date Value Ref Range Status   10/24/2023 6.3 See comment % Final     Comment:     Comment:  Diagnosis of Diabetes: > or = 6.5%  Increased risk of diabetes (Prediabetes): 5.7-6.4%  Glycemic Control: Nonpregnant Adults: <7.0%                    Pregnant: <6.0%           Patient Active Problem List   Diagnosis    Type 2 diabetes mellitus without complication (HCC)    Essential hypertension

## 2024-04-26 ENCOUNTER — OFFICE VISIT (OUTPATIENT)
Dept: FAMILY MEDICINE CLINIC | Age: 49
End: 2024-04-26
Payer: COMMERCIAL

## 2024-04-26 VITALS
RESPIRATION RATE: 16 BRPM | TEMPERATURE: 97.3 F | WEIGHT: 135 LBS | HEIGHT: 63 IN | DIASTOLIC BLOOD PRESSURE: 70 MMHG | OXYGEN SATURATION: 98 % | HEART RATE: 80 BPM | SYSTOLIC BLOOD PRESSURE: 110 MMHG | BODY MASS INDEX: 23.92 KG/M2

## 2024-04-26 DIAGNOSIS — M79.605 PAIN IN LEFT LEG: ICD-10-CM

## 2024-04-26 DIAGNOSIS — Z00.00 ANNUAL PHYSICAL EXAM: Primary | ICD-10-CM

## 2024-04-26 DIAGNOSIS — R22.31 LUMP IN ARMPIT, RIGHT: ICD-10-CM

## 2024-04-26 DIAGNOSIS — L40.9 PSORIASIS: ICD-10-CM

## 2024-04-26 PROCEDURE — 99396 PREV VISIT EST AGE 40-64: CPT | Performed by: FAMILY MEDICINE

## 2024-04-26 PROCEDURE — 3078F DIAST BP <80 MM HG: CPT | Performed by: FAMILY MEDICINE

## 2024-04-26 PROCEDURE — 3074F SYST BP LT 130 MM HG: CPT | Performed by: FAMILY MEDICINE

## 2024-04-26 RX ORDER — CALCIPOTRIENE 50 UG/G
CREAM TOPICAL
Qty: 1 EACH | Refills: 0 | Status: SHIPPED | OUTPATIENT
Start: 2024-04-26

## 2024-04-26 SDOH — ECONOMIC STABILITY: FOOD INSECURITY: WITHIN THE PAST 12 MONTHS, YOU WORRIED THAT YOUR FOOD WOULD RUN OUT BEFORE YOU GOT MONEY TO BUY MORE.: NEVER TRUE

## 2024-04-26 SDOH — ECONOMIC STABILITY: FOOD INSECURITY: WITHIN THE PAST 12 MONTHS, THE FOOD YOU BOUGHT JUST DIDN'T LAST AND YOU DIDN'T HAVE MONEY TO GET MORE.: NEVER TRUE

## 2024-04-26 SDOH — ECONOMIC STABILITY: INCOME INSECURITY: HOW HARD IS IT FOR YOU TO PAY FOR THE VERY BASICS LIKE FOOD, HOUSING, MEDICAL CARE, AND HEATING?: NOT HARD AT ALL

## 2024-04-26 ASSESSMENT — PATIENT HEALTH QUESTIONNAIRE - PHQ9
2. FEELING DOWN, DEPRESSED OR HOPELESS: NOT AT ALL
SUM OF ALL RESPONSES TO PHQ9 QUESTIONS 1 & 2: 0
SUM OF ALL RESPONSES TO PHQ QUESTIONS 1-9: 0
SUM OF ALL RESPONSES TO PHQ QUESTIONS 1-9: 0
1. LITTLE INTEREST OR PLEASURE IN DOING THINGS: NOT AT ALL
SUM OF ALL RESPONSES TO PHQ QUESTIONS 1-9: 0
SUM OF ALL RESPONSES TO PHQ QUESTIONS 1-9: 0

## 2024-04-30 ENCOUNTER — TELEPHONE (OUTPATIENT)
Dept: FAMILY MEDICINE CLINIC | Age: 49
End: 2024-04-30

## 2024-04-30 NOTE — TELEPHONE ENCOUNTER
Vale with [facility] received a referral from our office for an ancillary lump. They have attempted to contact the patient 3 times without success. They have been unable to schedule her.

## 2024-07-10 RX ORDER — OMEPRAZOLE 40 MG/1
CAPSULE, DELAYED RELEASE ORAL
Qty: 90 CAPSULE | Refills: 0 | Status: SHIPPED | OUTPATIENT
Start: 2024-07-10

## 2024-07-15 DIAGNOSIS — U07.1 COVID: ICD-10-CM

## 2024-07-16 RX ORDER — FLUTICASONE PROPIONATE 50 MCG
SPRAY, SUSPENSION (ML) NASAL
Qty: 1 EACH | Refills: 0 | Status: SHIPPED | OUTPATIENT
Start: 2024-07-16

## 2024-07-22 DIAGNOSIS — E55.9 VITAMIN D DEFICIENCY: ICD-10-CM

## 2024-07-23 RX ORDER — ISOPROPYL ALCOHOL 91 %
SPRAY, NON-AEROSOL (ML) TOPICAL DAILY
Qty: 90 CAPSULE | Refills: 0 | Status: SHIPPED | OUTPATIENT
Start: 2024-07-23

## 2024-08-03 DIAGNOSIS — E11.65 TYPE 2 DIABETES MELLITUS WITH HYPERGLYCEMIA, WITHOUT LONG-TERM CURRENT USE OF INSULIN (HCC): ICD-10-CM

## 2024-08-03 DIAGNOSIS — E78.5 DYSLIPIDEMIA ASSOCIATED WITH TYPE 2 DIABETES MELLITUS (HCC): ICD-10-CM

## 2024-08-03 DIAGNOSIS — E11.69 DYSLIPIDEMIA ASSOCIATED WITH TYPE 2 DIABETES MELLITUS (HCC): ICD-10-CM

## 2024-08-03 DIAGNOSIS — I10 ESSENTIAL HYPERTENSION: ICD-10-CM

## 2024-08-05 RX ORDER — EMPAGLIFLOZIN 25 MG/1
TABLET, FILM COATED ORAL
Qty: 90 TABLET | Refills: 1 | OUTPATIENT
Start: 2024-08-05

## 2024-08-06 DIAGNOSIS — I10 ESSENTIAL HYPERTENSION: ICD-10-CM

## 2024-08-06 DIAGNOSIS — E11.65 TYPE 2 DIABETES MELLITUS WITH HYPERGLYCEMIA, WITHOUT LONG-TERM CURRENT USE OF INSULIN (HCC): ICD-10-CM

## 2024-08-06 DIAGNOSIS — E11.69 DYSLIPIDEMIA ASSOCIATED WITH TYPE 2 DIABETES MELLITUS (HCC): ICD-10-CM

## 2024-08-06 DIAGNOSIS — I25.10 CORONARY ARTERY DISEASE, UNSPECIFIED VESSEL OR LESION TYPE, UNSPECIFIED WHETHER ANGINA PRESENT, UNSPECIFIED WHETHER NATIVE OR TRANSPLANTED HEART: ICD-10-CM

## 2024-08-06 DIAGNOSIS — E78.5 DYSLIPIDEMIA ASSOCIATED WITH TYPE 2 DIABETES MELLITUS (HCC): ICD-10-CM

## 2024-08-06 RX ORDER — EMPAGLIFLOZIN 25 MG/1
TABLET, FILM COATED ORAL
Qty: 90 TABLET | Refills: 1 | OUTPATIENT
Start: 2024-08-06

## 2024-08-07 RX ORDER — SALICYLIC ACID 40 %
81 ADHESIVE PATCH, MEDICATED TOPICAL DAILY
Qty: 90 TABLET | Refills: 0 | Status: SHIPPED | OUTPATIENT
Start: 2024-08-07

## 2024-08-07 NOTE — TELEPHONE ENCOUNTER
Last ov: 10/20/23  Upcoming ov: None     BMP:08/26/22  CBC:08/26/22        Front Please schedule pt appt     Plan:  Cardiac medications reviewed including indications and pertinent side effects. Medication list updated at this visit.   Check blood pressure and heart rate at home a few times per week- keep a log with dates and times and bring to office visit   Regular exercise and following a healthy diet encouraged   Follow up with me in 1 year   Medication refills have been provided for one year to your preferred pharmacy        MG: Would you like updated labs?

## 2024-08-09 RX ORDER — BLOOD-GLUCOSE SENSOR
EACH MISCELLANEOUS
Qty: 1 EACH | Refills: 5 | Status: SHIPPED | OUTPATIENT
Start: 2024-08-09

## 2024-08-09 NOTE — TELEPHONE ENCOUNTER
08/09 spoke w/ pt, pt scheduled 09/20 @ 8:15 w/ [facility] [place]

## 2024-08-09 NOTE — TELEPHONE ENCOUNTER
Future Appointments   Date Time Provider Department Center   9/3/2024 10:00 AM Emani Hurst MD [facility]   9/20/2024  8:15 AM Edward Lord MD [facility] 4/26/2024

## 2024-08-28 DIAGNOSIS — E11.9 TYPE 2 DIABETES MELLITUS WITHOUT COMPLICATION, WITHOUT LONG-TERM CURRENT USE OF INSULIN (HCC): ICD-10-CM

## 2024-08-28 DIAGNOSIS — E11.9 TYPE 2 DIABETES MELLITUS WITHOUT COMPLICATION, WITHOUT LONG-TERM CURRENT USE OF INSULIN (HCC): Primary | ICD-10-CM

## 2024-08-28 DIAGNOSIS — Z00.00 ANNUAL PHYSICAL EXAM: ICD-10-CM

## 2024-08-28 LAB
25(OH)D3 SERPL-MCNC: 60.5 NG/ML
ALBUMIN SERPL-MCNC: 4.5 G/DL (ref 3.4–5)
ALBUMIN/GLOB SERPL: 2.3 {RATIO} (ref 1.1–2.2)
ALP SERPL-CCNC: 78 U/L (ref 40–129)
ALT SERPL-CCNC: 17 U/L (ref 10–40)
ANION GAP SERPL CALCULATED.3IONS-SCNC: 12 MMOL/L (ref 3–16)
AST SERPL-CCNC: 14 U/L (ref 15–37)
BILIRUB SERPL-MCNC: 0.4 MG/DL (ref 0–1)
BUN SERPL-MCNC: 16 MG/DL (ref 7–20)
CALCIUM SERPL-MCNC: 9.3 MG/DL (ref 8.3–10.6)
CHLORIDE SERPL-SCNC: 105 MMOL/L (ref 99–110)
CHOLEST SERPL-MCNC: 142 MG/DL (ref 0–199)
CO2 SERPL-SCNC: 22 MMOL/L (ref 21–32)
CREAT SERPL-MCNC: 0.6 MG/DL (ref 0.6–1.1)
DEPRECATED RDW RBC AUTO: 16.2 % (ref 12.4–15.4)
GFR SERPLBLD CREATININE-BSD FMLA CKD-EPI: >90 ML/MIN/{1.73_M2}
GLUCOSE SERPL-MCNC: 131 MG/DL (ref 70–99)
HCT VFR BLD AUTO: 37 % (ref 36–48)
HDLC SERPL-MCNC: 50 MG/DL (ref 40–60)
HGB BLD-MCNC: 12.2 G/DL (ref 12–16)
LDLC SERPL CALC-MCNC: 80 MG/DL
MCH RBC QN AUTO: 26.1 PG (ref 26–34)
MCHC RBC AUTO-ENTMCNC: 33 G/DL (ref 31–36)
MCV RBC AUTO: 79.3 FL (ref 80–100)
PLATELET # BLD AUTO: 321 K/UL (ref 135–450)
PMV BLD AUTO: 9 FL (ref 5–10.5)
POTASSIUM SERPL-SCNC: 4.4 MMOL/L (ref 3.5–5.1)
PROT SERPL-MCNC: 6.5 G/DL (ref 6.4–8.2)
RBC # BLD AUTO: 4.66 M/UL (ref 4–5.2)
SODIUM SERPL-SCNC: 139 MMOL/L (ref 136–145)
TRIGL SERPL-MCNC: 58 MG/DL (ref 0–150)
TSH SERPL DL<=0.005 MIU/L-ACNC: 1.53 UIU/ML (ref 0.27–4.2)
VLDLC SERPL CALC-MCNC: 12 MG/DL
WBC # BLD AUTO: 6.1 K/UL (ref 4–11)

## 2024-08-28 PROCEDURE — 36415 COLL VENOUS BLD VENIPUNCTURE: CPT | Performed by: FAMILY MEDICINE

## 2024-08-29 LAB
EST. AVERAGE GLUCOSE BLD GHB EST-MCNC: 142.7 MG/DL
HBA1C MFR BLD: 6.6 %

## 2024-09-03 ENCOUNTER — OFFICE VISIT (OUTPATIENT)
Dept: ENDOCRINOLOGY | Age: 49
End: 2024-09-03
Payer: COMMERCIAL

## 2024-09-03 VITALS
OXYGEN SATURATION: 100 % | HEART RATE: 76 BPM | WEIGHT: 133 LBS | BODY MASS INDEX: 23.57 KG/M2 | DIASTOLIC BLOOD PRESSURE: 75 MMHG | SYSTOLIC BLOOD PRESSURE: 123 MMHG | HEIGHT: 63 IN

## 2024-09-03 DIAGNOSIS — E11.40 TYPE 2 DIABETES MELLITUS WITH DIABETIC NEUROPATHY, WITHOUT LONG-TERM CURRENT USE OF INSULIN (HCC): Primary | ICD-10-CM

## 2024-09-03 DIAGNOSIS — E78.5 DYSLIPIDEMIA ASSOCIATED WITH TYPE 2 DIABETES MELLITUS (HCC): ICD-10-CM

## 2024-09-03 DIAGNOSIS — E11.65 TYPE 2 DIABETES MELLITUS WITH HYPERGLYCEMIA, WITHOUT LONG-TERM CURRENT USE OF INSULIN (HCC): ICD-10-CM

## 2024-09-03 DIAGNOSIS — E11.69 DYSLIPIDEMIA ASSOCIATED WITH TYPE 2 DIABETES MELLITUS (HCC): ICD-10-CM

## 2024-09-03 DIAGNOSIS — I10 ESSENTIAL HYPERTENSION: ICD-10-CM

## 2024-09-03 PROCEDURE — 3017F COLORECTAL CA SCREEN DOC REV: CPT | Performed by: INTERNAL MEDICINE

## 2024-09-03 PROCEDURE — 3074F SYST BP LT 130 MM HG: CPT | Performed by: INTERNAL MEDICINE

## 2024-09-03 PROCEDURE — G8420 CALC BMI NORM PARAMETERS: HCPCS | Performed by: INTERNAL MEDICINE

## 2024-09-03 PROCEDURE — 95251 CONT GLUC MNTR ANALYSIS I&R: CPT | Performed by: INTERNAL MEDICINE

## 2024-09-03 PROCEDURE — G8427 DOCREV CUR MEDS BY ELIG CLIN: HCPCS | Performed by: INTERNAL MEDICINE

## 2024-09-03 PROCEDURE — 2022F DILAT RTA XM EVC RTNOPTHY: CPT | Performed by: INTERNAL MEDICINE

## 2024-09-03 PROCEDURE — 3078F DIAST BP <80 MM HG: CPT | Performed by: INTERNAL MEDICINE

## 2024-09-03 PROCEDURE — 1036F TOBACCO NON-USER: CPT | Performed by: INTERNAL MEDICINE

## 2024-09-03 PROCEDURE — 99214 OFFICE O/P EST MOD 30 MIN: CPT | Performed by: INTERNAL MEDICINE

## 2024-09-03 PROCEDURE — 3044F HG A1C LEVEL LT 7.0%: CPT | Performed by: INTERNAL MEDICINE

## 2024-09-03 RX ORDER — METFORMIN HCL 500 MG
1000 TABLET, EXTENDED RELEASE 24 HR ORAL 2 TIMES DAILY
Qty: 120 TABLET | Refills: 5 | Status: SHIPPED | OUTPATIENT
Start: 2024-09-03

## 2024-09-03 NOTE — PROGRESS NOTES
08/28/2024 321  135 - 450 K/uL Final    MPV 08/28/2024 9.0  5.0 - 10.5 fL Final   Orders Only on 10/24/2023   Component Date Value Ref Range Status    Vitamin B-12 10/24/2023 486  211 - 911 pg/mL Final    Folate 10/24/2023 13.62  4.78 - 24.20 ng/mL Final    Hemoglobin A1C 10/24/2023 6.3  See comment % Final    Estimated Avg Glucose 10/24/2023 134.1  mg/dL Final    Microalb, Ur 10/24/2023 <1.20  <2.0 mg/dL Final    Creatinine, Ur 10/24/2023 58.2  28.0 - 259.0 mg/dL Final    Microalb/Creat Ratio 10/24/2023 see below  0.0 - 30.0 mg/g Final           Assessment and Plan     Reina was seen today for follow-up and diabetes.    Diagnoses and all orders for this visit:    Type 2 diabetes mellitus with diabetic neuropathy, without long-term current use of insulin (HCC)    Dyslipidemia associated with type 2 diabetes mellitus (HCC)  -     Discontinue: empagliflozin (JARDIANCE) 25 MG tablet; Take 1 tablet by mouth daily  -     Hemoglobin A1C; Future  -     Microalbumin / Creatinine Urine Ratio; Future  -     empagliflozin (JARDIANCE) 25 MG tablet; Take 1 tablet by mouth daily  -      DIABETES FOOT EXAM    Essential hypertension  -     Discontinue: empagliflozin (JARDIANCE) 25 MG tablet; Take 1 tablet by mouth daily  -     Hemoglobin A1C; Future  -     Microalbumin / Creatinine Urine Ratio; Future  -     empagliflozin (JARDIANCE) 25 MG tablet; Take 1 tablet by mouth daily  -      DIABETES FOOT EXAM    Other orders  -     metFORMIN (GLUCOPHAGE-XR) 500 MG extended release tablet; Take 2 tablets by mouth in the morning and at bedtime           1: Type 2 DM complicated with neuropathy   Controlled A1C 6.6%< 6.4% < 9.1%   Target < 7%     Diagnosed with DM less than 25, and BMI around 25-26. She could be TOMI.     Freestyle david personal CGMS data downloaded and reviewed   Average glucose: 144 < 144 < 147 <180  Blood sugars: Above 180 - 11% ,  - 89%, below 70 - 0%  Blood sugars are somewhat better in am   Blood sugars

## 2024-09-20 ENCOUNTER — ANCILLARY PROCEDURE (OUTPATIENT)
Dept: CARDIOLOGY CLINIC | Age: 49
End: 2024-09-20
Payer: COMMERCIAL

## 2024-09-20 ENCOUNTER — OFFICE VISIT (OUTPATIENT)
Dept: CARDIOLOGY CLINIC | Age: 49
End: 2024-09-20
Payer: COMMERCIAL

## 2024-09-20 ENCOUNTER — TELEPHONE (OUTPATIENT)
Dept: CARDIOLOGY CLINIC | Age: 49
End: 2024-09-20

## 2024-09-20 VITALS
HEART RATE: 86 BPM | HEIGHT: 63 IN | SYSTOLIC BLOOD PRESSURE: 118 MMHG | BODY MASS INDEX: 23.67 KG/M2 | DIASTOLIC BLOOD PRESSURE: 70 MMHG | OXYGEN SATURATION: 99 % | WEIGHT: 133.6 LBS

## 2024-09-20 DIAGNOSIS — I25.10 CORONARY ARTERY DISEASE INVOLVING NATIVE CORONARY ARTERY OF NATIVE HEART WITHOUT ANGINA PECTORIS: Primary | ICD-10-CM

## 2024-09-20 DIAGNOSIS — R00.2 PALPITATIONS: ICD-10-CM

## 2024-09-20 DIAGNOSIS — E78.5 DYSLIPIDEMIA ASSOCIATED WITH TYPE 2 DIABETES MELLITUS (HCC): ICD-10-CM

## 2024-09-20 DIAGNOSIS — R06.09 DOE (DYSPNEA ON EXERTION): ICD-10-CM

## 2024-09-20 DIAGNOSIS — E11.69 DYSLIPIDEMIA ASSOCIATED WITH TYPE 2 DIABETES MELLITUS (HCC): ICD-10-CM

## 2024-09-20 DIAGNOSIS — G47.30 SLEEP APNEA, UNSPECIFIED TYPE: ICD-10-CM

## 2024-09-20 DIAGNOSIS — I10 ESSENTIAL HYPERTENSION: ICD-10-CM

## 2024-09-20 PROCEDURE — 3017F COLORECTAL CA SCREEN DOC REV: CPT | Performed by: INTERNAL MEDICINE

## 2024-09-20 PROCEDURE — G8420 CALC BMI NORM PARAMETERS: HCPCS | Performed by: INTERNAL MEDICINE

## 2024-09-20 PROCEDURE — 2022F DILAT RTA XM EVC RTNOPTHY: CPT | Performed by: INTERNAL MEDICINE

## 2024-09-20 PROCEDURE — 1036F TOBACCO NON-USER: CPT | Performed by: INTERNAL MEDICINE

## 2024-09-20 PROCEDURE — 3044F HG A1C LEVEL LT 7.0%: CPT | Performed by: INTERNAL MEDICINE

## 2024-09-20 PROCEDURE — 3078F DIAST BP <80 MM HG: CPT | Performed by: INTERNAL MEDICINE

## 2024-09-20 PROCEDURE — 3074F SYST BP LT 130 MM HG: CPT | Performed by: INTERNAL MEDICINE

## 2024-09-20 PROCEDURE — 93270 REMOTE 30 DAY ECG REV/REPORT: CPT | Performed by: INTERNAL MEDICINE

## 2024-09-20 PROCEDURE — G8427 DOCREV CUR MEDS BY ELIG CLIN: HCPCS | Performed by: INTERNAL MEDICINE

## 2024-09-20 PROCEDURE — 99214 OFFICE O/P EST MOD 30 MIN: CPT | Performed by: INTERNAL MEDICINE

## 2024-09-30 RX ORDER — OMEPRAZOLE 40 MG/1
40 CAPSULE, DELAYED RELEASE ORAL
Qty: 90 CAPSULE | Refills: 0 | Status: SHIPPED | OUTPATIENT
Start: 2024-09-30

## 2024-09-30 NOTE — TELEPHONE ENCOUNTER
Lov Visit date not found    Future Appointments   Date Time Provider Department Center   10/1/2024 10:00 AM Yanci Souza APRN - CNP [facility] SLEEP [facility]   10/10/2024  9:30 AM [facility] ECHO & VASCULAR [facility]   11/25/2024  3:00 PM Clarissa Martell DO [place] ECC [facility]   2/5/2025 11:20 AM Emani Hurst MD [facility]   10/3/2025  8:00 AM Edward Lord MD [facility]

## 2024-10-01 ENCOUNTER — TELEMEDICINE (OUTPATIENT)
Dept: SLEEP MEDICINE | Age: 49
End: 2024-10-01
Payer: COMMERCIAL

## 2024-10-01 ENCOUNTER — TELEPHONE (OUTPATIENT)
Dept: PULMONOLOGY | Age: 49
End: 2024-10-01

## 2024-10-01 DIAGNOSIS — R53.83 OTHER FATIGUE: ICD-10-CM

## 2024-10-01 DIAGNOSIS — R06.83 SNORING: Primary | ICD-10-CM

## 2024-10-01 DIAGNOSIS — G47.10 HYPERSOMNIA: ICD-10-CM

## 2024-10-01 DIAGNOSIS — R00.2 PALPITATIONS: ICD-10-CM

## 2024-10-01 DIAGNOSIS — F51.04 PSYCHOPHYSIOLOGICAL INSOMNIA: ICD-10-CM

## 2024-10-01 DIAGNOSIS — I10 ESSENTIAL HYPERTENSION: ICD-10-CM

## 2024-10-01 DIAGNOSIS — Z72.821 POOR SLEEP HYGIENE: ICD-10-CM

## 2024-10-01 DIAGNOSIS — G25.81 RLS (RESTLESS LEGS SYNDROME): ICD-10-CM

## 2024-10-01 PROCEDURE — 99244 OFF/OP CNSLTJ NEW/EST MOD 40: CPT | Performed by: NURSE PRACTITIONER

## 2024-10-01 PROCEDURE — G8427 DOCREV CUR MEDS BY ELIG CLIN: HCPCS | Performed by: NURSE PRACTITIONER

## 2024-10-01 ASSESSMENT — SLEEP AND FATIGUE QUESTIONNAIRES
ESS TOTAL SCORE: 9
HOW LIKELY ARE YOU TO NOD OFF OR FALL ASLEEP WHILE LYING DOWN TO REST IN THE AFTERNOON WHEN CIRCUMSTANCES PERMIT: SLIGHT CHANCE OF DOZING
HOW LIKELY ARE YOU TO NOD OFF OR FALL ASLEEP WHILE SITTING INACTIVE IN A PUBLIC PLACE: WOULD NEVER DOZE
HOW LIKELY ARE YOU TO NOD OFF OR FALL ASLEEP WHILE WATCHING TV: HIGH CHANCE OF DOZING
HOW LIKELY ARE YOU TO NOD OFF OR FALL ASLEEP WHEN YOU ARE A PASSENGER IN A CAR FOR AN HOUR WITHOUT A BREAK: SLIGHT CHANCE OF DOZING
HOW LIKELY ARE YOU TO NOD OFF OR FALL ASLEEP WHILE SITTING AND READING: SLIGHT CHANCE OF DOZING
HOW LIKELY ARE YOU TO NOD OFF OR FALL ASLEEP WHILE SITTING QUIETLY AFTER LUNCH WITHOUT ALCOHOL: HIGH CHANCE OF DOZING
HOW LIKELY ARE YOU TO NOD OFF OR FALL ASLEEP WHILE SITTING AND TALKING TO SOMEONE: WOULD NEVER DOZE
HOW LIKELY ARE YOU TO NOD OFF OR FALL ASLEEP IN A CAR, WHILE STOPPED FOR A FEW MINUTES IN TRAFFIC: WOULD NEVER DOZE

## 2024-10-01 NOTE — PROGRESS NOTES
Patient ID: Reina Stockton is a 49 y.o. female who is being seen today for   Chief Complaint   Patient presents with    New Patient     No prior SS  Daytime sleepiness   Sent by cardiologist  Teeth grinding     Referring: Dr. Edward Lord    HPI:   Reina Stockton is a 49 y.o. female for televideo appointment via video and audio virtual visit for sleep apnea evaluation. Patient reports occasional  snoring at night for the past 2 years. Worse in supine position. Has witnessed apnea. Rarely wakes up at night gasping/jerking for air.  Wakes up with palpitations.  No restorative sleep. +dry mouth upon awakening. Fatigue and tiredness during the day. No history of sleep apnea. Bedtime 10 pm- MN and rise time is 640 am. It takes usually  minutes to fall asleep (has TV on and turns brightness down gradually.  1-3 nocturia. Wakes up 3-10 times at night. It takes few-unknown minutes to fall back a sleep. Takes occasional nap during the day (  minutes). No headache in am. No car wrecks or near wrecks because of the sleepiness. No nodding off while driving. No weight gain. Some forgetfulness and decreased concentration.  Drinks 1 caffinated beverages per day, states caffeine makes her sleepy. Rare alcohol. +restless feelings in legs at night. No loss of muscle strength when angry or laugh. No hallucination when dozing off or waking up from sleep. No paralysis upon awakening from sleep or going to sleep. +teeth grinding. No nightmares. No sleep walking. No night time panic attacks. No narcotics. No drug abuse. No history of depression. +History of anxiety. No history of atrial fibrillation. +history of DM.  +history of HTN. No history of ischemic heart disease. No history of stroke. ESS is 9 . No smoking. No FH for RLS or narcolepsy.  +FH for GUMARO- son        10/1/2024     9:58 AM   Sleep Medicine   Sitting and reading 1   Watching TV 3   Sitting, inactive in a public place (e.g. a theatre or a meeting) 0   As a

## 2024-10-01 NOTE — TELEPHONE ENCOUNTER
Routed HST order to sleep center. Sent patient mychart message with sleep center info. Scheduled HST follow up appt.

## 2024-10-01 NOTE — PATIENT INSTRUCTIONS
napping the day of your study as these will affect the accuracy of your test results.

## 2024-10-03 ENCOUNTER — TELEPHONE (OUTPATIENT)
Dept: SLEEP CENTER | Age: 49
End: 2024-10-03

## 2024-10-03 NOTE — TELEPHONE ENCOUNTER
Called patient at [phone] to schedule HST but there was no answer and the v/m was full so I could not leave a message.

## 2024-10-08 NOTE — TELEPHONE ENCOUNTER
[facility]    Background: Transitional Care Management program identified per system criteria and reviewed by Conne[facility] Resource [facility] team for possible outreach.    Assessment: Upon chart review, CCRC Team member will not proceed with patient outreach related to this episode of Transitional Care Management program due to reason below:    Non-[facility] TCU: CCRC team member noted patient discharged to TCU/ARU/LTACH. Patient is not established with a [facility] currently supported by [facility] Primary Care-Care Coordination therefore handoff to Primary Care-Care Coordination is not appropriate at this time.    Plan: Transitional Care Management episode addressed appropriately per reason noted above.      Desi Fowler MA  [facility], [facility]    *Connected Care Resource Team does NOT follow patient ongoing. Referrals are identified based on internal discharge reports and the outreach is to ensure patient has an understanding of their discharge instructions.   L/M at [phone] for patient to call back to schedule HST.

## 2024-10-11 DIAGNOSIS — I10 ESSENTIAL HYPERTENSION: ICD-10-CM

## 2024-10-11 RX ORDER — LABETALOL 200 MG/1
200 TABLET, FILM COATED ORAL 2 TIMES DAILY
Qty: 180 TABLET | Refills: 3 | Status: SHIPPED | OUTPATIENT
Start: 2024-10-11

## 2024-10-13 LAB — ECHO BSA: 1.65 M2

## 2024-10-13 PROCEDURE — 93272 ECG/REVIEW INTERPRET ONLY: CPT | Performed by: INTERNAL MEDICINE

## 2024-10-24 DIAGNOSIS — E55.9 VITAMIN D DEFICIENCY: ICD-10-CM

## 2024-10-24 RX ORDER — ACETAMINOPHEN 500 MG
5000 TABLET ORAL DAILY
Qty: 90 CAPSULE | Refills: 0 | Status: SHIPPED | OUTPATIENT
Start: 2024-10-24

## 2024-10-24 NOTE — TELEPHONE ENCOUNTER
LAST REFILL 07/23/2024  AMOUNT 90     0 REFILLS  LAST VISIT 4/26/2024    NEXT VISIT 11/25/24 New to Provider

## 2024-10-28 DIAGNOSIS — E08.00 DIABETES MELLITUS DUE TO UNDERLYING CONDITION WITH HYPEROSMOLARITY WITHOUT COMA, WITHOUT LONG-TERM CURRENT USE OF INSULIN (HCC): Primary | ICD-10-CM

## 2024-10-28 DIAGNOSIS — I25.10 CORONARY ARTERY DISEASE, UNSPECIFIED VESSEL OR LESION TYPE, UNSPECIFIED WHETHER ANGINA PRESENT, UNSPECIFIED WHETHER NATIVE OR TRANSPLANTED HEART: ICD-10-CM

## 2024-10-28 RX ORDER — ASPIRIN 81 MG/1
81 TABLET, COATED ORAL DAILY
Qty: 90 TABLET | Refills: 3 | Status: SHIPPED | OUTPATIENT
Start: 2024-10-28

## 2024-10-28 RX ORDER — BLOOD-GLUCOSE SENSOR
EACH MISCELLANEOUS
Qty: 2 EACH | Refills: 5 | Status: CANCELLED | OUTPATIENT
Start: 2024-10-28

## 2024-10-28 RX ORDER — BLOOD-GLUCOSE SENSOR
EACH MISCELLANEOUS
Qty: 2 EACH | Refills: 5 | Status: SHIPPED | OUTPATIENT
Start: 2024-10-28

## 2024-10-28 NOTE — TELEPHONE ENCOUNTER
Future Appointments   Date Time Provider Department Center   11/5/2024 11:15 AM SCHEDULE, [facility] SLEEP ROOM 1 [facility] SLEEP [facility]   11/8/2024  8:40 AM Yanci Souza APRN - CNP [facility]   11/25/2024  3:00 PM Clarissa Martell DO [place] [facility] ECC [facility]   2/5/2025 11:20 AM Emani Hurst MD [facility]   10/3/2025  8:00 AM Edward Lord MD [facility]

## 2024-11-25 ENCOUNTER — OFFICE VISIT (OUTPATIENT)
Dept: FAMILY MEDICINE CLINIC | Age: 49
End: 2024-11-25

## 2024-11-25 VITALS
OXYGEN SATURATION: 98 % | WEIGHT: 132.6 LBS | HEART RATE: 78 BPM | RESPIRATION RATE: 17 BRPM | HEIGHT: 63 IN | BODY MASS INDEX: 23.5 KG/M2 | DIASTOLIC BLOOD PRESSURE: 80 MMHG | SYSTOLIC BLOOD PRESSURE: 126 MMHG | TEMPERATURE: 97.5 F

## 2024-11-25 DIAGNOSIS — R71.8 DECREASED MEAN CORPUSCULAR VOLUME: ICD-10-CM

## 2024-11-25 DIAGNOSIS — Z76.89 ENCOUNTER TO ESTABLISH CARE: Primary | ICD-10-CM

## 2024-11-25 DIAGNOSIS — R53.83 FATIGUE, UNSPECIFIED TYPE: ICD-10-CM

## 2024-11-25 DIAGNOSIS — I10 ESSENTIAL HYPERTENSION: ICD-10-CM

## 2024-11-25 DIAGNOSIS — E78.00 PURE HYPERCHOLESTEROLEMIA: ICD-10-CM

## 2024-11-25 DIAGNOSIS — L40.9 PSORIASIS: ICD-10-CM

## 2024-11-25 DIAGNOSIS — E08.00 DIABETES MELLITUS DUE TO UNDERLYING CONDITION WITH HYPEROSMOLARITY WITHOUT COMA, WITHOUT LONG-TERM CURRENT USE OF INSULIN (HCC): ICD-10-CM

## 2024-11-25 DIAGNOSIS — J45.20 MILD INTERMITTENT ASTHMA WITHOUT COMPLICATION: ICD-10-CM

## 2024-11-25 RX ORDER — TACROLIMUS 1 MG/G
OINTMENT TOPICAL
Qty: 30 G | Refills: 0 | Status: SHIPPED | OUTPATIENT
Start: 2024-11-25

## 2024-11-25 SDOH — ECONOMIC STABILITY: FOOD INSECURITY: WITHIN THE PAST 12 MONTHS, YOU WORRIED THAT YOUR FOOD WOULD RUN OUT BEFORE YOU GOT MONEY TO BUY MORE.: NEVER TRUE

## 2024-11-25 SDOH — ECONOMIC STABILITY: FOOD INSECURITY: WITHIN THE PAST 12 MONTHS, THE FOOD YOU BOUGHT JUST DIDN'T LAST AND YOU DIDN'T HAVE MONEY TO GET MORE.: NEVER TRUE

## 2024-11-25 SDOH — ECONOMIC STABILITY: INCOME INSECURITY: HOW HARD IS IT FOR YOU TO PAY FOR THE VERY BASICS LIKE FOOD, HOUSING, MEDICAL CARE, AND HEATING?: NOT HARD AT ALL

## 2024-11-25 ASSESSMENT — ANXIETY QUESTIONNAIRES
4. TROUBLE RELAXING: NOT AT ALL
IF YOU CHECKED OFF ANY PROBLEMS ON THIS QUESTIONNAIRE, HOW DIFFICULT HAVE THESE PROBLEMS MADE IT FOR YOU TO DO YOUR WORK, TAKE CARE OF THINGS AT HOME, OR GET ALONG WITH OTHER PEOPLE: NOT DIFFICULT AT ALL
3. WORRYING TOO MUCH ABOUT DIFFERENT THINGS: NOT AT ALL
5. BEING SO RESTLESS THAT IT IS HARD TO SIT STILL: NOT AT ALL
1. FEELING NERVOUS, ANXIOUS, OR ON EDGE: NOT AT ALL
7. FEELING AFRAID AS IF SOMETHING AWFUL MIGHT HAPPEN: NOT AT ALL
6. BECOMING EASILY ANNOYED OR IRRITABLE: NOT AT ALL
2. NOT BEING ABLE TO STOP OR CONTROL WORRYING: NOT AT ALL
GAD7 TOTAL SCORE: 0

## 2024-11-25 ASSESSMENT — PATIENT HEALTH QUESTIONNAIRE - PHQ9
SUM OF ALL RESPONSES TO PHQ QUESTIONS 1-9: 0
SUM OF ALL RESPONSES TO PHQ9 QUESTIONS 1 & 2: 0
SUM OF ALL RESPONSES TO PHQ QUESTIONS 1-9: 0
SUM OF ALL RESPONSES TO PHQ QUESTIONS 1-9: 0
2. FEELING DOWN, DEPRESSED OR HOPELESS: NOT AT ALL
SUM OF ALL RESPONSES TO PHQ QUESTIONS 1-9: 0
1. LITTLE INTEREST OR PLEASURE IN DOING THINGS: NOT AT ALL

## 2024-11-25 NOTE — PROGRESS NOTES
11/25/2024    This is a 49 y.o. female   Chief Complaint   Patient presents with    New Patient     Pain under ribs for a few years, took steroids that shot blood sugar up, think it could be lungs    .    Transitioning care from Dr Rodriguez, had physical in April.   Saw endo and cardio in September, 5 and 12 m f/u, respectively.   Notes increased SOB with activity - drop in hgb from her normal 14 to 12 with a mcv of 79 for 90s - may have some decreased iron contributing         Past Medical History:   Diagnosis Date    Chest pain 05/2021    + NM stress test    COVID-19 03/05/2023    DM (diabetes mellitus) (HCC)     Hypertension        Past Surgical History:   Procedure Laterality Date    CARDIAC CATHETERIZATION  05/14/2021    Non Obs CAD, medical management, <20% stenosis       Social History     Socioeconomic History    Marital status:      Spouse name: Not on file    Number of children: Not on file    Years of education: Not on file    Highest education level: Not on file   Occupational History    Not on file   Tobacco Use    Smoking status: Never     Passive exposure: Past    Smokeless tobacco: Never   Vaping Use    Vaping status: Never Used   Substance and Sexual Activity    Alcohol use: Not Currently    Drug use: No    Sexual activity: Yes     Partners: Male   Other Topics Concern    Not on file   Social History Narrative    Not on file     Social Determinants of Health     Financial Resource Strain: Low Risk  (11/25/2024)    Overall Financial Resource Strain (CARDIA)     Difficulty of Paying Living Expenses: Not hard at all   Food Insecurity: No Food Insecurity (11/25/2024)    Hunger Vital Sign     Worried About Running Out of Food in the Last Year: Never true     Ran Out of Food in the Last Year: Never true   Transportation Needs: Unknown (11/25/2024)    PRAPARE - Transportation     Lack of Transportation (Medical): Not on file     Lack of Transportation (Non-Medical): No   Physical Activity: Not on file

## 2024-11-26 LAB
BASOPHILS # BLD: 0.1 K/UL (ref 0–0.2)
BASOPHILS NFR BLD: 0.7 %
DEPRECATED RDW RBC AUTO: 16 % (ref 12.4–15.4)
EOSINOPHIL # BLD: 0.4 K/UL (ref 0–0.6)
EOSINOPHIL NFR BLD: 4.1 %
FERRITIN SERPL IA-MCNC: 8.1 NG/ML (ref 15–150)
HCT VFR BLD AUTO: 38.2 % (ref 36–48)
HGB BLD-MCNC: 12.3 G/DL (ref 12–16)
IRON SATN MFR SERPL: 7 % (ref 15–50)
IRON SERPL-MCNC: 29 UG/DL (ref 37–145)
LYMPHOCYTES # BLD: 1.8 K/UL (ref 1–5.1)
LYMPHOCYTES NFR BLD: 18.6 %
MCH RBC QN AUTO: 24.8 PG (ref 26–34)
MCHC RBC AUTO-ENTMCNC: 32.1 G/DL (ref 31–36)
MCV RBC AUTO: 77.4 FL (ref 80–100)
MONOCYTES # BLD: 1 K/UL (ref 0–1.3)
MONOCYTES NFR BLD: 10 %
NEUTROPHILS # BLD: 6.5 K/UL (ref 1.7–7.7)
NEUTROPHILS NFR BLD: 66.6 %
PLATELET # BLD AUTO: 373 K/UL (ref 135–450)
PMV BLD AUTO: 9.2 FL (ref 5–10.5)
RBC # BLD AUTO: 4.94 M/UL (ref 4–5.2)
TIBC SERPL-MCNC: 392 UG/DL (ref 260–445)
WBC # BLD AUTO: 9.8 K/UL (ref 4–11)

## 2024-11-27 ENCOUNTER — TELEPHONE (OUTPATIENT)
Dept: FAMILY MEDICINE CLINIC | Age: 49
End: 2024-11-27

## 2024-11-27 DIAGNOSIS — E08.00 DIABETES MELLITUS DUE TO UNDERLYING CONDITION WITH HYPEROSMOLARITY WITHOUT COMA, WITHOUT LONG-TERM CURRENT USE OF INSULIN (HCC): Primary | ICD-10-CM

## 2024-11-27 NOTE — TELEPHONE ENCOUNTER
Freestyle david 3 is out of stock anywhere and the patient wants to know if we can send her some freestyle david 2 instead  She is also requesting a call from dr bennett to review abnormal results

## 2024-12-02 ENCOUNTER — TELEPHONE (OUTPATIENT)
Dept: FAMILY MEDICINE CLINIC | Age: 49
End: 2024-12-02

## 2024-12-02 RX ORDER — BLOOD-GLUCOSE SENSOR
1 EACH MISCELLANEOUS
Qty: 6 EACH | Refills: 0 | Status: SHIPPED | OUTPATIENT
Start: 2024-12-02

## 2024-12-02 RX ORDER — ACYCLOVIR 800 MG/1
1 TABLET ORAL PRN
Qty: 2 EACH | Refills: 5 | Status: CANCELLED | OUTPATIENT
Start: 2024-12-02

## 2024-12-02 NOTE — TELEPHONE ENCOUNTER
Pt called in  States she needs the Freestyle Laura 3 (plain not plus) called in  She has been out  Called pharmacy  Verbal given  Use every 14 days      Called pt to let her know

## 2024-12-02 NOTE — TELEPHONE ENCOUNTER
Patient was given her lab result  She is asking for more clarification  Would like to know why it is low - has family hx of crohn's & colon cancer  Was told not to take calcium due to her freestyle david 3 will pick it up as sugar    Please advise

## 2024-12-06 NOTE — TELEPHONE ENCOUNTER
Pt called stating she has not been able to get her Laura 3 sensor. She is being told it is on  back order and it could be months before they get it.     Pt is asking if a script for the Laura 2 reader and supplies could be sent to see if it is in stock.   She also wants a One touch ultra meter and supplies be sent in as you are supposed to check it against the Laura.     [facility] on file.     Please call pt with any questions.

## 2024-12-10 DIAGNOSIS — E78.00 PURE HYPERCHOLESTEROLEMIA: ICD-10-CM

## 2024-12-10 DIAGNOSIS — E11.9 TYPE 2 DIABETES MELLITUS WITHOUT COMPLICATION, WITHOUT LONG-TERM CURRENT USE OF INSULIN (HCC): ICD-10-CM

## 2024-12-10 DIAGNOSIS — J45.20 MILD INTERMITTENT ASTHMA WITHOUT COMPLICATION: ICD-10-CM

## 2024-12-10 RX ORDER — ATORVASTATIN CALCIUM 80 MG/1
80 TABLET, FILM COATED ORAL DAILY
Qty: 90 TABLET | Refills: 2 | Status: SHIPPED | OUTPATIENT
Start: 2024-12-10

## 2024-12-10 RX ORDER — BLOOD SUGAR DIAGNOSTIC
STRIP MISCELLANEOUS
Qty: 100 STRIP | Refills: 3 | Status: CANCELLED | OUTPATIENT
Start: 2024-12-10

## 2024-12-10 NOTE — TELEPHONE ENCOUNTER
Last Office Visit: 9/20/2024 Provider: CHRISTOS  Is provider OOT? No    Next Office Visit: 10/3/25 Provider: [facility]      LAST LABS:     CMP:   Lab Results   Component Value Date     08/28/2024    K 4.4 08/28/2024     08/28/2024    CO2 22 08/28/2024    BUN 16 08/28/2024    CREATININE 0.6 08/28/2024    GLUCOSE 131 (H) 08/28/2024    CALCIUM 9.3 08/28/2024    BILITOT 0.4 08/28/2024    ALKPHOS 78 08/28/2024    AST 14 (L) 08/28/2024    ALT 17 08/28/2024    LABGLOM >90 08/28/2024    GFRAA >60 08/26/2022    AGRATIO 2.3 (H) 08/28/2024    GLOB 2.6 03/23/2021          Lipid:   Lab Results   Component Value Date    CHOL 142 08/28/2024    TRIG 58 08/28/2024    HDL 50 08/28/2024    LDL 80 08/28/2024    VLDL 12 08/28/2024       Is encounter provider correct?   Yes  Does refill dosage match last filled?   Yes  Changes to script from Tele Encounters or LOV Plan?   no  Adjust amount or refills to reflect last and upcoming OV?  yes     Requested Prescriptions     Pending Prescriptions Disp Refills    atorvastatin (LIPITOR) 80 MG tablet [Pharmacy Med Name: ATORVASTATIN 80 MG TABLET] 90 tablet 3     Sig: TAKE 1 TABLET BY MOUTH EVERY DAY

## 2024-12-11 NOTE — TELEPHONE ENCOUNTER
Refill Request     CONFIRM preferred pharmacy with the patient.    If Mail Order Rx - Pend for 90 day refill.      Last Seen: Last Seen Department: Visit date not found  Last Seen by PCP: Visit date not found    Last Written: 3/4/21 1 with 5 refills     If no future appointment scheduled:  Review the last OV with PCP and review information for follow-up visit,  Route STAFF MESSAGE with patient name to the  Pool for scheduling with the following information:            -  Timing of next visit           -  Visit type ie Physical, OV, etc           -  Diagnoses/Reason ie. COPD, HTN - Do not use MEDICATION, Follow-up or CHECK UP - Give reason for visit      Next Appointment:   Future Appointments   Date Time Provider Department Center   2/5/2025 11:20 AM Emani Hurst MD [facility]   10/3/2025  8:00 AM Edward Lord MD [facility]       Message sent to  to schedule appt with patient?  NO      Requested Prescriptions     Pending Prescriptions Disp Refills    albuterol sulfate (PROAIR RESPICLICK) 108 (90 Base) MCG/ACT aerosol powder inhalation 1 each 5     Sig: Inhale 2 puffs into the lungs every 6 hours as needed for Wheezing or Shortness of Breath

## 2024-12-12 RX ORDER — ALBUTEROL SULFATE 90 UG/1
2 INHALANT RESPIRATORY (INHALATION) EVERY 4 HOURS PRN
Qty: 18 G | Refills: 3 | Status: SHIPPED | OUTPATIENT
Start: 2024-12-12

## 2024-12-26 RX ORDER — OMEPRAZOLE 40 MG/1
40 CAPSULE, DELAYED RELEASE ORAL
Qty: 90 CAPSULE | Refills: 1 | Status: SHIPPED | OUTPATIENT
Start: 2024-12-26

## 2024-12-26 NOTE — TELEPHONE ENCOUNTER
Future Appointments   Date Time Provider Department Center   2/5/2025 11:20 AM Emani Hurst MD [facility]   10/3/2025  8:00 AM Edward Lord MD [facility] 11/25/2024

## 2024-12-27 DIAGNOSIS — E11.9 TYPE 2 DIABETES MELLITUS WITHOUT COMPLICATION, WITHOUT LONG-TERM CURRENT USE OF INSULIN (HCC): ICD-10-CM

## 2024-12-27 RX ORDER — BLOOD-GLUCOSE METER
1 EACH MISCELLANEOUS DAILY
Qty: 1 KIT | Refills: 0 | Status: SHIPPED | OUTPATIENT
Start: 2024-12-27

## 2024-12-27 RX ORDER — BLOOD SUGAR DIAGNOSTIC
STRIP MISCELLANEOUS
Qty: 100 STRIP | Refills: 3 | Status: SHIPPED | OUTPATIENT
Start: 2024-12-27

## 2024-12-27 NOTE — TELEPHONE ENCOUNTER
Started iron 65 mg 6 weeks ago is having swelling in hands and feet and  really dry mouth wants to know if the Iron can cause that or should she be on a lower dose? Please advise

## 2024-12-27 NOTE — TELEPHONE ENCOUNTER
Patient lost diabetes kit  would like to get a new one lancets  11/25/2024        Future Appointments   Date Time Provider Department Center   2/5/2025 11:20 AM Emani Hurst MD [facility]   10/3/2025  8:00 AM Edward Lord MD [facility]

## 2024-12-31 DIAGNOSIS — L40.9 PSORIASIS: ICD-10-CM

## 2024-12-31 RX ORDER — TACROLIMUS 1 MG/G
OINTMENT TOPICAL
Qty: 30 G | Refills: 0 | Status: SHIPPED | OUTPATIENT
Start: 2024-12-31

## 2024-12-31 NOTE — TELEPHONE ENCOUNTER
Future Appointments   Date Time Provider Department Center   1/2/2025  2:00 PM Clarissa Martell DO [place] [facility]   2/5/2025 11:20 AM Emani Hurst MD [facility]   10/3/2025  8:00 AM Edward Lord MD [facility] 11/25/2024

## 2025-01-16 RX ORDER — ACYCLOVIR 800 MG/1
TABLET ORAL
Qty: 2 EACH | Refills: 5 | Status: SHIPPED | OUTPATIENT
Start: 2025-01-16

## 2025-01-28 DIAGNOSIS — L40.9 PSORIASIS: ICD-10-CM

## 2025-01-28 RX ORDER — TACROLIMUS 1 MG/G
OINTMENT TOPICAL
Qty: 30 G | Refills: 2 | Status: SHIPPED | OUTPATIENT
Start: 2025-01-28

## 2025-01-28 NOTE — TELEPHONE ENCOUNTER
Lov 11/25/2024    Future Appointments   Date Time Provider Department Center   2/5/2025 11:20 AM Emani Hurst MD [facility]   10/3/2025  8:00 AM Edward Lord MD [facility]

## 2025-02-05 ENCOUNTER — OFFICE VISIT (OUTPATIENT)
Dept: ENDOCRINOLOGY | Age: 50
End: 2025-02-05
Payer: COMMERCIAL

## 2025-02-05 VITALS
DIASTOLIC BLOOD PRESSURE: 84 MMHG | WEIGHT: 135.8 LBS | HEART RATE: 62 BPM | BODY MASS INDEX: 24.06 KG/M2 | SYSTOLIC BLOOD PRESSURE: 128 MMHG | HEIGHT: 63 IN | OXYGEN SATURATION: 100 %

## 2025-02-05 DIAGNOSIS — E11.65 TYPE 2 DIABETES MELLITUS WITH HYPERGLYCEMIA, WITHOUT LONG-TERM CURRENT USE OF INSULIN (HCC): ICD-10-CM

## 2025-02-05 DIAGNOSIS — I10 ESSENTIAL HYPERTENSION: ICD-10-CM

## 2025-02-05 DIAGNOSIS — E78.5 DYSLIPIDEMIA ASSOCIATED WITH TYPE 2 DIABETES MELLITUS (HCC): Primary | ICD-10-CM

## 2025-02-05 DIAGNOSIS — E11.40 TYPE 2 DIABETES MELLITUS WITH DIABETIC NEUROPATHY, WITHOUT LONG-TERM CURRENT USE OF INSULIN (HCC): ICD-10-CM

## 2025-02-05 DIAGNOSIS — E78.5 DYSLIPIDEMIA ASSOCIATED WITH TYPE 2 DIABETES MELLITUS (HCC): ICD-10-CM

## 2025-02-05 DIAGNOSIS — E11.69 DYSLIPIDEMIA ASSOCIATED WITH TYPE 2 DIABETES MELLITUS (HCC): Primary | ICD-10-CM

## 2025-02-05 DIAGNOSIS — E11.69 DYSLIPIDEMIA ASSOCIATED WITH TYPE 2 DIABETES MELLITUS (HCC): ICD-10-CM

## 2025-02-05 DIAGNOSIS — E11.9 TYPE 2 DIABETES MELLITUS WITHOUT COMPLICATION, WITHOUT LONG-TERM CURRENT USE OF INSULIN (HCC): ICD-10-CM

## 2025-02-05 LAB
CREAT UR-MCNC: 20.7 MG/DL (ref 28–259)
EST. AVERAGE GLUCOSE BLD GHB EST-MCNC: 125.5 MG/DL
HBA1C MFR BLD: 6 %
MICROALBUMIN UR DL<=1MG/L-MCNC: <1.2 MG/DL
MICROALBUMIN/CREAT UR: ABNORMAL MG/G (ref 0–30)

## 2025-02-05 PROCEDURE — 1036F TOBACCO NON-USER: CPT | Performed by: INTERNAL MEDICINE

## 2025-02-05 PROCEDURE — 3017F COLORECTAL CA SCREEN DOC REV: CPT | Performed by: INTERNAL MEDICINE

## 2025-02-05 PROCEDURE — G8427 DOCREV CUR MEDS BY ELIG CLIN: HCPCS | Performed by: INTERNAL MEDICINE

## 2025-02-05 PROCEDURE — 99214 OFFICE O/P EST MOD 30 MIN: CPT | Performed by: INTERNAL MEDICINE

## 2025-02-05 PROCEDURE — 3046F HEMOGLOBIN A1C LEVEL >9.0%: CPT | Performed by: INTERNAL MEDICINE

## 2025-02-05 PROCEDURE — 2022F DILAT RTA XM EVC RTNOPTHY: CPT | Performed by: INTERNAL MEDICINE

## 2025-02-05 PROCEDURE — G8420 CALC BMI NORM PARAMETERS: HCPCS | Performed by: INTERNAL MEDICINE

## 2025-02-05 PROCEDURE — 95251 CONT GLUC MNTR ANALYSIS I&R: CPT | Performed by: INTERNAL MEDICINE

## 2025-02-05 NOTE — PROGRESS NOTES
Patient ID:   Reina Stockton is a 49 y.o. female     Chief Complaint:   Reina Stockton presents for an evaluation of Type 2 Diabetes Mellitus , Hyperlipidemia and hypertension.     Subjective:   Gestational DM with every pregnancy. First pregnancy at age 21 and last pregnancy at age 34.   Type 2 Diabetes Mellitus diagnosed in 8.7%      LOV Oct 2023   NS March 2024      Metformin ER 500mg, ne tab twice a day. Rarely take sup to 4 if sugars are high .  Two doses at a time cause stomach issues    Jardiance 25 mg daily in am     Using Laura 3   It made her aware of sugars   Helped her losing weight   Weight is up 3 lbs since LOV     Checks blood sugars 5 times per day. Reviewed/Reported  AM:   Lunch:  Supper:   HS:     Hypoglycemias: None. She had hypoglycemias with Glyburide.     Meals: Three, dinner is bigger. One snack at bedtime ( bowl of cereal, cheese sticks, PB toast). Drinks diet sodas.   Exercise: None    Saw nutritionist with her work recently. She has seen Manuel Powell in the best    Denies chest pain, exertional dyspnea.     Maternal GM with diabetes. Father was diabetic at age 55. Younger brother with DM diagnosed at age 45. An Younger brother is hypoglycemic, not sure of he has DM. Maternal GM had DM.   Family history of CAD: Father had Mi at age 51.   Denies smoking  Currently on ASA 81 mg daily     The following portions of the patient's history were reviewed and updated as appropriate:       Family History   Problem Relation Age of Onset    High Blood Pressure Mother     Hypertension Mother     High Blood Pressure Father     Cancer Father 49        colorectal    Diabetes Father     Hypertension Father     Heart Attack Father     Other Father         GERD-surgery    Diabetes Brother     Other Brother         GERD-surgery    Hypertension Maternal Grandmother     Diabetes Maternal Grandmother     Hypertension Maternal Grandfather     Other Maternal Grandfather         gastric ulcers requiring surgery

## 2025-04-08 ENCOUNTER — TELEPHONE (OUTPATIENT)
Dept: ENDOCRINOLOGY | Age: 50
End: 2025-04-08

## 2025-04-08 DIAGNOSIS — D50.9 IRON DEFICIENCY ANEMIA, UNSPECIFIED IRON DEFICIENCY ANEMIA TYPE: Primary | ICD-10-CM

## 2025-04-08 DIAGNOSIS — E11.69 DYSLIPIDEMIA ASSOCIATED WITH TYPE 2 DIABETES MELLITUS: ICD-10-CM

## 2025-04-08 DIAGNOSIS — I10 ESSENTIAL HYPERTENSION: ICD-10-CM

## 2025-04-08 DIAGNOSIS — E78.5 DYSLIPIDEMIA ASSOCIATED WITH TYPE 2 DIABETES MELLITUS: ICD-10-CM

## 2025-04-08 NOTE — TELEPHONE ENCOUNTER
Pt needs her GI referral to be [facility] not [facility] she [facility]

## 2025-04-08 NOTE — TELEPHONE ENCOUNTER
[facility] RX has been sent for :    empagliflozin (JARDIANCE) 25 MG tablet [[id number]]       [facility] #[id number] - [place], [place] - [address] - P [phone] - F [phone]  [address] [place] [id number]  Phone: [phone]  Fax: [phone]

## 2025-04-19 DIAGNOSIS — E11.9 TYPE 2 DIABETES MELLITUS WITHOUT COMPLICATIONS (HCC): ICD-10-CM

## 2025-04-21 RX ORDER — METFORMIN HYDROCHLORIDE 500 MG/1
1000 TABLET, EXTENDED RELEASE ORAL 2 TIMES DAILY
Qty: 360 TABLET | Refills: 1 | Status: SHIPPED | OUTPATIENT
Start: 2025-04-21

## 2025-04-21 NOTE — TELEPHONE ENCOUNTER
Medication:   Requested Prescriptions     Pending Prescriptions Disp Refills    metFORMIN (GLUCOPHAGE-XR) 500 MG extended release tablet [Pharmacy Med Name: METFORMIN HCL  MG TABLET] 360 tablet 1     Sig: TAKE 2 TABLETS BY MOUTH IN THE MORNING AND AT BEDTIME       Last Filled:      Patient Phone Number: [phone] (home)     Last appt: 2/5/2025   Next appt: 7/2/2025    Last Labs DM:   Lab Results   Component Value Date/Time    LABA1C 6.0 02/05/2025 12:17 PM

## 2025-05-19 ENCOUNTER — TELEPHONE (OUTPATIENT)
Dept: FAMILY MEDICINE CLINIC | Age: 50
End: 2025-05-19

## 2025-05-19 DIAGNOSIS — E11.40 TYPE 2 DIABETES MELLITUS WITH DIABETIC NEUROPATHY, WITHOUT LONG-TERM CURRENT USE OF INSULIN (HCC): Primary | ICD-10-CM

## 2025-05-19 NOTE — TELEPHONE ENCOUNTER
Patient called  Laura III discontinue  Needs us to send in Free Style Laura III plus  [facility]

## 2025-05-20 RX ORDER — HYDROCHLOROTHIAZIDE 12.5 MG/1
1 CAPSULE ORAL
Qty: 2 EACH | Refills: 5 | Status: SHIPPED | OUTPATIENT
Start: 2025-05-20

## 2025-05-27 ENCOUNTER — TELEPHONE (OUTPATIENT)
Dept: ENDOCRINOLOGY | Age: 50
End: 2025-05-27

## 2025-06-26 NOTE — TELEPHONE ENCOUNTER
Future Appointments   Date Time Provider Department Center   7/2/2025 11:00 AM Emani Hurst MD [facility]   10/3/2025  8:00 AM Edward Lord MD [facility] 11/25/2024

## 2025-06-27 RX ORDER — OMEPRAZOLE 40 MG/1
40 CAPSULE, DELAYED RELEASE ORAL
Qty: 90 CAPSULE | Refills: 1 | Status: SHIPPED | OUTPATIENT
Start: 2025-06-27

## 2025-07-18 DIAGNOSIS — E55.9 VITAMIN D DEFICIENCY: ICD-10-CM

## 2025-07-18 RX ORDER — ACETAMINOPHEN 500 MG
TABLET ORAL DAILY
Qty: 90 CAPSULE | Refills: 1 | Status: SHIPPED | OUTPATIENT
Start: 2025-07-18

## 2025-07-18 NOTE — TELEPHONE ENCOUNTER
Future Appointments   Date Time Provider Department Center   7/22/2025 10:40 AM Emani Hurst MD [facility]   10/3/2025  8:00 AM Edward Lord MD [facility] 11/25/2024